# Patient Record
Sex: MALE | Race: WHITE | Employment: FULL TIME | URBAN - METROPOLITAN AREA
[De-identification: names, ages, dates, MRNs, and addresses within clinical notes are randomized per-mention and may not be internally consistent; named-entity substitution may affect disease eponyms.]

---

## 2017-04-06 ENCOUNTER — HOSPITAL ENCOUNTER (OUTPATIENT)
Dept: GENERAL RADIOLOGY | Age: 62
Discharge: HOME | End: 2017-04-06
Attending: ORTHOPAEDIC SURGERY
Payer: COMMERCIAL

## 2017-04-06 DIAGNOSIS — M16.0 OSTEOARTHRITIS OF BOTH HIPS: ICD-10-CM

## 2017-04-06 PROCEDURE — 77030003666 HC NDL SPINAL BD -A

## 2017-04-06 PROCEDURE — 74011000250 HC RX REV CODE- 250: Performed by: RADIOLOGY

## 2017-04-06 PROCEDURE — 20610 DRAIN/INJ JOINT/BURSA W/O US: CPT

## 2017-04-06 PROCEDURE — 74011250636 HC RX REV CODE- 250/636: Performed by: RADIOLOGY

## 2017-04-06 PROCEDURE — 74011636320 HC RX REV CODE- 636/320: Performed by: RADIOLOGY

## 2017-04-06 RX ORDER — TRIAMCINOLONE ACETONIDE 40 MG/ML
80 INJECTION, SUSPENSION INTRA-ARTICULAR; INTRAMUSCULAR
Status: COMPLETED | OUTPATIENT
Start: 2017-04-06 | End: 2017-04-06

## 2017-04-06 RX ORDER — LIDOCAINE HYDROCHLORIDE 10 MG/ML
20 INJECTION INFILTRATION; PERINEURAL
Status: COMPLETED | OUTPATIENT
Start: 2017-04-06 | End: 2017-04-06

## 2017-04-06 RX ORDER — BUPIVACAINE HYDROCHLORIDE 5 MG/ML
5 INJECTION, SOLUTION EPIDURAL; INTRACAUDAL
Status: DISPENSED | OUTPATIENT
Start: 2017-04-06 | End: 2017-04-06

## 2017-04-06 RX ADMIN — LIDOCAINE HYDROCHLORIDE 20 ML: 10 INJECTION, SOLUTION INFILTRATION; PERINEURAL at 12:10

## 2017-04-06 RX ADMIN — TRIAMCINOLONE ACETONIDE 40 MG: 40 INJECTION, SUSPENSION INTRA-ARTICULAR; INTRAMUSCULAR at 12:11

## 2017-04-06 RX ADMIN — IOHEXOL 20 ML: 180 INJECTION INTRAVENOUS at 12:10

## 2017-08-14 ENCOUNTER — OFFICE VISIT (OUTPATIENT)
Dept: CARDIOLOGY CLINIC | Age: 62
End: 2017-08-14

## 2017-08-14 VITALS
RESPIRATION RATE: 18 BRPM | DIASTOLIC BLOOD PRESSURE: 100 MMHG | SYSTOLIC BLOOD PRESSURE: 172 MMHG | HEART RATE: 67 BPM | WEIGHT: 191 LBS | OXYGEN SATURATION: 98 %

## 2017-08-14 DIAGNOSIS — R00.2 PALPITATIONS: Primary | ICD-10-CM

## 2017-08-14 DIAGNOSIS — R03.0 ELEVATED BP WITHOUT DIAGNOSIS OF HYPERTENSION: ICD-10-CM

## 2017-08-14 PROBLEM — I10 ESSENTIAL HYPERTENSION: Status: ACTIVE | Noted: 2017-08-14

## 2017-08-14 RX ORDER — CHOLECALCIFEROL (VITAMIN D3) 125 MCG
1 CAPSULE ORAL AS NEEDED
COMMUNITY
End: 2019-07-25

## 2017-08-14 RX ORDER — MULTIVIT WITH MINERALS/HERBS
1 TABLET ORAL DAILY
COMMUNITY

## 2017-08-14 NOTE — MR AVS SNAPSHOT
Visit Information Date & Time Provider Department Dept. Phone Encounter #  
 8/14/2017 11:00 AM Barry Frausto MD [facility] Cornell [phone] [id number] Your Appointments 8/30/2017  8:00 AM  
ECHO CARDIOGRAMS 2D with ECHO, BLANCA  
[facility] ([facility] SCHEDULING) [address] [facility] [place] [address] [facility] [address] [address] Upcoming Health Maintenance Date Due Hepatitis C Screening 1955 DTaP/Tdap/Td series (1 - Tdap) 9/16/1976 FOBT Q 1 YEAR AGE 50-75 9/16/2005 ZOSTER VACCINE AGE 60> 7/16/2015 INFLUENZA AGE 9 TO ADULT 8/1/2017 Allergies as of 8/14/2017  Review Complete On: 4/6/2017 By: RT Kaykay No Known Allergies Current Immunizations  Never Reviewed No immunizations on file. Not reviewed this visit You Were Diagnosed With   
  
 Codes Comments Essential hypertension    -  Primary ICD-10-CM: I10 
ICD-9-CM: 401.9 Vitals BP Pulse Resp Weight(growth percentile) SpO2 Smoking Status (!) 172/100 67 18 191 lb (86.6 kg) 98% Never Smoker Your Updated Medication List  
  
   
This list is accurate as of: 8/14/17 11:41 AM.  Always use your most recent med list.  
  
  
  
  
 ALEVE 220 mg Cap Generic drug:  naproxen sodium Take  by mouth.  
  
 b complex vitamins tablet Take 1 Tab by mouth daily. We Performed the Following AMB POC EKG ROUTINE W/ 12 LEADS, INTER & REP [51804 CPT(R)] Please provide this summary of care documentation to your next provider. Your primary care clinician is listed as Neha Remy. If you have any questions after today's visit, please call [phone].

## 2017-08-14 NOTE — PROGRESS NOTES
[address]  [address]  [place], [address] [facility]    Office [phone]  Fax [phone]  Cell [phone]        René Orellana is a 64 y.o. male referred for evaluation of palpitations. Consult requested by Viviana Snow MD.      Assessment  Encounter Diagnoses   Name Primary?  Palpitations Yes    Elevated BP without diagnosis of hypertension        Recommendations:    René Orellana has frequent spells of palpitations lasting 1-2 hours most suggestive of AF. He has no exertional sxs although did have one episode of palpitations associated with exercise. His exam and EKG are normal. BP is high today without antecedent HTN. Will evaluate further with 30 day monitor as well as ETT and echo. Favor routine labs- BMP, CBC, TSH    Phone follow up after reviewing tests    Subjective:  Mr. Pino Narayan has no previous cardiac hx. He stays active running with refereeing lacrosse, tennis, and volleyball. He reports that 7-8 months ago he first had palpitations and ever since has had intermittent random episodes that are increasing in frequency. Most recently, palpitations occur once every 7-8 days. Episodes typically last 1-2 hours but the most recent episode lasted 4 hours. He states when he is exercising while having episodes of palpitations he gets easily fatigued but episodes are typically not associated with exertion. He has a hx of unprovoked PE 15 years ago. He denies any gastrointestinal issues. He has not had any blood work recently. He states that he limits sweets and drinks a 6 pack of beer once a week. His sister had triple bypass surgery one year ago but states that she did not eat well. He denies any family history of A fib. He states he has been checking BPs at grocery store. He takes Aleve on a PRN basis. Patient denies any exertional chest pain, dyspnea, syncope, orthopnea, edema or paroxysmal nocturnal dyspnea.       Cardiac risk factors   HTN no  DM no  Smoking no  Family hx of CAD yes    Cardiac testing  No specialty comments available. Past Medical History:   Diagnosis Date    Pulmonary embolism (HCC)         Current Outpatient Prescriptions   Medication Sig Dispense Refill    naproxen sodium (ALEVE) 220 mg cap Take  by mouth.  b complex vitamins tablet Take 1 Tab by mouth daily. 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 Review of Systems  Constitutional: Negative for fever, chills, malaise/fatigue and diaphoresis. Respiratory: Negative for cough, hemoptysis, sputum production, shortness of breath and wheezing. Cardiovascular: Negative for chest pain, orthopnea, claudication, leg swelling and PND. Positive for palpitations. Gastrointestinal: Negative for heartburn, nausea, vomiting, blood in stool and melena. Genitourinary: Negative for dysuria and flank pain. Musculoskeletal: Negative for joint pain and back pain. Skin: Negative for rash. Neurological: Negative for focal weakness, seizures, loss of consciousness, weakness and headaches. Endo/Heme/Allergies: Does not bruise/bleed easily. Psychiatric/Behavioral: Negative for memory loss. The patient does not have insomnia. Physical Exam    Visit Vitals    BP (!) 172/100    Pulse 67    Resp 18    Wt 191 lb (86.6 kg)    SpO2 98%     Wt Readings from Last 3 Encounters:   08/14/17 191 lb (86.6 kg)      General - well developed well nourished  Neck - JVP normal, thyroid nl  Cardiac - normal S1, S2, no murmurs, rubs or gallops. No clicks  Vascular - carotids without bruits, radials, femorals and pedal pulses equal bilateral  Lungs - clear to auscultation bilaterals, no rales, wheezing or rhonchi  Abd - soft nontender, no HSM, no abd bruits  Extremities - no edema  Skin - no rash  Neuro - nonfocal  Psych - normal mood and affect      Cardiographics  EKG 8/14/17- SR, lateral T wave inversions, no previous EKG.      Written by Heather Spaulding, dictated by Solo Santos MD.  Solo Santos MD

## 2017-08-16 DIAGNOSIS — R00.2 PALPITATIONS: Primary | ICD-10-CM

## 2017-08-16 DIAGNOSIS — R03.0 ELEVATED BP WITHOUT DIAGNOSIS OF HYPERTENSION: ICD-10-CM

## 2017-08-20 PROBLEM — I10 ESSENTIAL HYPERTENSION: Status: RESOLVED | Noted: 2017-08-14 | Resolved: 2017-08-20

## 2017-08-29 ENCOUNTER — HOSPITAL ENCOUNTER (OUTPATIENT)
Dept: GENERAL RADIOLOGY | Age: 62
Discharge: HOME | End: 2017-08-29
Attending: ORTHOPAEDIC SURGERY
Payer: COMMERCIAL

## 2017-08-29 DIAGNOSIS — M16.0 ARTHRITIS OF BOTH HIPS: ICD-10-CM

## 2017-08-29 PROCEDURE — 20610 DRAIN/INJ JOINT/BURSA W/O US: CPT

## 2017-08-29 PROCEDURE — 74011250636 HC RX REV CODE- 250/636: Performed by: RADIOLOGY

## 2017-08-29 PROCEDURE — 74011000250 HC RX REV CODE- 250: Performed by: RADIOLOGY

## 2017-08-29 PROCEDURE — 74011636320 HC RX REV CODE- 636/320: Performed by: RADIOLOGY

## 2017-08-29 RX ORDER — LIDOCAINE HYDROCHLORIDE 10 MG/ML
10 INJECTION INFILTRATION; PERINEURAL
Status: COMPLETED | OUTPATIENT
Start: 2017-08-29 | End: 2017-08-29

## 2017-08-29 RX ORDER — BUPIVACAINE HYDROCHLORIDE 5 MG/ML
5 INJECTION, SOLUTION EPIDURAL; INTRACAUDAL
Status: COMPLETED | OUTPATIENT
Start: 2017-08-29 | End: 2017-08-29

## 2017-08-29 RX ORDER — TRIAMCINOLONE ACETONIDE 40 MG/ML
80 INJECTION, SUSPENSION INTRA-ARTICULAR; INTRAMUSCULAR
Status: COMPLETED | OUTPATIENT
Start: 2017-08-29 | End: 2017-08-29

## 2017-08-29 RX ADMIN — SODIUM BICARBONATE 3 ML: 0.2 INJECTION, SOLUTION INTRAVENOUS at 10:50

## 2017-08-29 RX ADMIN — BUPIVACAINE HYDROCHLORIDE 5 MG: 5 INJECTION, SOLUTION EPIDURAL; INTRACAUDAL at 10:50

## 2017-08-29 RX ADMIN — LIDOCAINE HYDROCHLORIDE 5 ML: 10 INJECTION, SOLUTION INFILTRATION; PERINEURAL at 10:50

## 2017-08-29 RX ADMIN — TRIAMCINOLONE ACETONIDE 40 MG: 40 INJECTION, SUSPENSION INTRA-ARTICULAR; INTRAMUSCULAR at 10:50

## 2017-08-29 RX ADMIN — IOHEXOL 10 ML: 300 INJECTION, SOLUTION INTRAVENOUS at 10:50

## 2017-08-30 ENCOUNTER — CLINICAL SUPPORT (OUTPATIENT)
Dept: CARDIOLOGY CLINIC | Age: 62
End: 2017-08-30

## 2017-08-30 DIAGNOSIS — R00.2 PALPITATIONS: Primary | ICD-10-CM

## 2017-08-30 DIAGNOSIS — R00.2 PALPITATIONS: ICD-10-CM

## 2017-08-30 DIAGNOSIS — R03.0 ELEVATED BP WITHOUT DIAGNOSIS OF HYPERTENSION: ICD-10-CM

## 2017-08-30 NOTE — PROGRESS NOTES
See scanned report. Dr. Corinthia Goodell ordered study and Dr. Corinthia Goodell read study. ID verified per protocol. Test and risks explained. Consent signed after all patient questions answered. BP elevated prior to test. Dr. Corinthia Goodell notified and test was continued. Patient leg discomfort and dyspnea during test. At 4 minutes in recovery, patient without symptoms or complaints voiced. Hypertensive BP response with exercise. Dr. Corinthia Goodell notified and interpreted stress test and echo which was done earlier. BP remained elevated, but to level when pateint arrived. Dr. Corinthia Goodell evaluated patient and discussed next plan of actions.

## 2017-09-07 DIAGNOSIS — R03.0 ELEVATED BP WITHOUT DIAGNOSIS OF HYPERTENSION: Primary | ICD-10-CM

## 2017-09-07 RX ORDER — LISINOPRIL 10 MG/1
10 TABLET ORAL DAILY
Qty: 30 TAB | Refills: 5 | Status: SHIPPED | OUTPATIENT
Start: 2017-09-07 | End: 2017-09-11 | Stop reason: SDUPTHER

## 2017-09-07 NOTE — TELEPHONE ENCOUNTER
Myrna Caro MD  [facility], L[facility]                     Can you send Rx for lisinopril 20/d for HTN? Change event recorder to 24 holter       Patient notified.

## 2017-09-11 ENCOUNTER — CLINICAL SUPPORT (OUTPATIENT)
Dept: CARDIOLOGY CLINIC | Age: 62
End: 2017-09-11

## 2017-09-11 DIAGNOSIS — R00.2 PALPITATIONS: Primary | ICD-10-CM

## 2017-09-11 DIAGNOSIS — R03.0 ELEVATED BP WITHOUT DIAGNOSIS OF HYPERTENSION: ICD-10-CM

## 2017-09-11 RX ORDER — LISINOPRIL 10 MG/1
10 TABLET ORAL DAILY
Qty: 30 TAB | Refills: 5 | Status: SHIPPED | OUTPATIENT
Start: 2017-09-11 | End: 2018-05-29 | Stop reason: SDUPTHER

## 2017-09-11 NOTE — PROGRESS NOTES
Applied holter monitor for pt per Dr August Mccray  Dx: palps. Pt has #[id number] & is due back on Tues 9/12/17.

## 2017-09-19 ENCOUNTER — TELEPHONE (OUTPATIENT)
Dept: CARDIOLOGY CLINIC | Age: 62
End: 2017-09-19

## 2017-09-19 NOTE — TELEPHONE ENCOUNTER
L/m for pt regarding overdue holter. He received it on Mon 9/11/17 & was due back on Tues 9/12/17. Pt needs to return as soon as possible in order to get results.

## 2017-09-22 ENCOUNTER — TELEPHONE (OUTPATIENT)
Dept: CARDIOLOGY CLINIC | Age: 62
End: 2017-09-22

## 2017-09-22 NOTE — TELEPHONE ENCOUNTER
Pt stated that he needs cardiac clearance from Dr Betina Johnston for hip surgery he will be having at the end of Oct.  Please call him regarding this for more information. Can be reached at his verified cell #. Pt returned his holter monitor today & stated it kept falling off. He thinks he may have worn it for 12 - 13 hrs. Will not be able to tell until we read monitor. Informed pt that it might need to be repeated.

## 2017-10-03 ENCOUNTER — TELEPHONE (OUTPATIENT)
Dept: CARDIOLOGY CLINIC | Age: 62
End: 2017-10-03

## 2017-10-03 PROBLEM — R00.2 PALPITATIONS: Status: RESOLVED | Noted: 2017-08-14 | Resolved: 2017-10-03

## 2017-10-03 PROBLEM — R03.0 ELEVATED BP WITHOUT DIAGNOSIS OF HYPERTENSION: Status: RESOLVED | Noted: 2017-08-14 | Resolved: 2017-10-03

## 2017-10-03 PROBLEM — I10 HTN (HYPERTENSION), BENIGN: Status: ACTIVE | Noted: 2017-10-03

## 2017-10-03 PROBLEM — I48.0 PAROXYSMAL ATRIAL FIBRILLATION (HCC): Status: ACTIVE | Noted: 2017-10-03

## 2017-10-03 RX ORDER — METOPROLOL SUCCINATE 50 MG/1
50 TABLET, EXTENDED RELEASE ORAL DAILY
Qty: 30 TAB | Refills: 3 | Status: SHIPPED | OUTPATIENT
Start: 2017-10-03 | End: 2018-05-29 | Stop reason: SDUPTHER

## 2017-10-03 NOTE — TELEPHONE ENCOUNTER
Cardiac testing  ETT 8/30/17 - 10:00. PMHR 169 (105%). Rest -104. Stress /120. No angina with exercise. Non-ischemic EKG with stress. Echo 8/30/17 - Ef 60-65%. No WMA. Mild LVH. Mild LAE. Small atrial septal aneurysm. Mild MR.  24 hour Holter monitor 9/11/17 - SR . Frequent PAC's. Non-sustained atrial tach vs. Afib with RVR. Significant HTN  - normal ETT  PAF  - mild LAE      Started on lisinopril 2 weeks ago  Will start Toprol XL 50 mg/d  Consider rhythm control Rx if frequent AF spells  [id number] score = 1 but favor long term anticoagulation nonetheless - defer until after hip surgery end of this month. Low cardiac risk for hip surgery.      Will see him 3-4 weeks post op

## 2017-10-05 ENCOUNTER — HOSPITAL ENCOUNTER (OUTPATIENT)
Dept: PREADMISSION TESTING | Age: 62
Discharge: HOME | End: 2017-10-05
Payer: COMMERCIAL

## 2017-10-05 VITALS
TEMPERATURE: 97.8 F | HEART RATE: 58 BPM | SYSTOLIC BLOOD PRESSURE: 183 MMHG | BODY MASS INDEX: 26.73 KG/M2 | DIASTOLIC BLOOD PRESSURE: 111 MMHG | WEIGHT: 180.5 LBS | HEIGHT: 69 IN

## 2017-10-05 LAB
ABO + RH BLD: NORMAL
ANION GAP SERPL CALC-SCNC: 7 MMOL/L (ref 5–15)
APPEARANCE UR: CLEAR
BACTERIA URNS QL MICRO: NEGATIVE /HPF
BILIRUB UR QL: NEGATIVE
BLOOD GROUP ANTIBODIES SERPL: NORMAL
BUN SERPL-MCNC: 22 MG/DL (ref 6–20)
BUN/CREAT SERPL: 18 (ref 12–20)
CALCIUM SERPL-MCNC: 9.2 MG/DL (ref 8.5–10.1)
CHLORIDE SERPL-SCNC: 106 MMOL/L (ref 97–108)
CO2 SERPL-SCNC: 28 MMOL/L (ref 21–32)
COLOR UR: ABNORMAL
CREAT SERPL-MCNC: 1.19 MG/DL (ref 0.7–1.3)
EPITH CASTS URNS QL MICRO: ABNORMAL /LPF
ERYTHROCYTE [DISTWIDTH] IN BLOOD BY AUTOMATED COUNT: 13 % (ref 11.5–14.5)
EST. AVERAGE GLUCOSE BLD GHB EST-MCNC: 117 MG/DL
GLUCOSE SERPL-MCNC: 109 MG/DL (ref 65–100)
GLUCOSE UR STRIP.AUTO-MCNC: NEGATIVE MG/DL
HBA1C MFR BLD: 5.7 % (ref 4.2–6.3)
HCT VFR BLD AUTO: 46.5 % (ref 36.6–50.3)
HGB BLD-MCNC: 15.6 G/DL (ref 12.1–17)
HGB UR QL STRIP: ABNORMAL
HYALINE CASTS URNS QL MICRO: ABNORMAL /LPF (ref 0–5)
INR PPP: 1 (ref 0.9–1.1)
KETONES UR QL STRIP.AUTO: NEGATIVE MG/DL
LEUKOCYTE ESTERASE UR QL STRIP.AUTO: NEGATIVE
MCH RBC QN AUTO: 31.8 PG (ref 26–34)
MCHC RBC AUTO-ENTMCNC: 33.5 G/DL (ref 30–36.5)
MCV RBC AUTO: 94.9 FL (ref 80–99)
NITRITE UR QL STRIP.AUTO: NEGATIVE
PH UR STRIP: 6 [PH] (ref 5–8)
PLATELET # BLD AUTO: 202 K/UL (ref 150–400)
POTASSIUM SERPL-SCNC: 4.1 MMOL/L (ref 3.5–5.1)
PROT UR STRIP-MCNC: NEGATIVE MG/DL
PROTHROMBIN TIME: 10.3 SEC (ref 9–11.1)
RBC # BLD AUTO: 4.9 M/UL (ref 4.1–5.7)
RBC #/AREA URNS HPF: ABNORMAL /HPF (ref 0–5)
SODIUM SERPL-SCNC: 141 MMOL/L (ref 136–145)
SP GR UR REFRACTOMETRY: 1.02 (ref 1–1.03)
SPECIMEN EXP DATE BLD: NORMAL
UA: UC IF INDICATED,UAUC: ABNORMAL
UROBILINOGEN UR QL STRIP.AUTO: 0.2 EU/DL (ref 0.2–1)
WBC # BLD AUTO: 12.6 K/UL (ref 4.1–11.1)
WBC URNS QL MICRO: ABNORMAL /HPF (ref 0–4)

## 2017-10-05 PROCEDURE — 85027 COMPLETE CBC AUTOMATED: CPT | Performed by: ORTHOPAEDIC SURGERY

## 2017-10-05 PROCEDURE — 86900 BLOOD TYPING SEROLOGIC ABO: CPT | Performed by: ORTHOPAEDIC SURGERY

## 2017-10-05 PROCEDURE — 36415 COLL VENOUS BLD VENIPUNCTURE: CPT | Performed by: ORTHOPAEDIC SURGERY

## 2017-10-05 PROCEDURE — 80048 BASIC METABOLIC PNL TOTAL CA: CPT | Performed by: ORTHOPAEDIC SURGERY

## 2017-10-05 PROCEDURE — 85610 PROTHROMBIN TIME: CPT | Performed by: ORTHOPAEDIC SURGERY

## 2017-10-05 PROCEDURE — 81001 URINALYSIS AUTO W/SCOPE: CPT | Performed by: ORTHOPAEDIC SURGERY

## 2017-10-05 PROCEDURE — 83036 HEMOGLOBIN GLYCOSYLATED A1C: CPT | Performed by: ORTHOPAEDIC SURGERY

## 2017-10-05 RX ORDER — DICLOFENAC SODIUM 75 MG/1
75 TABLET, DELAYED RELEASE ORAL 2 TIMES DAILY
COMMUNITY
End: 2017-10-10

## 2017-10-05 RX ORDER — TRAMADOL HYDROCHLORIDE 50 MG/1
50 TABLET ORAL
COMMUNITY
End: 2017-10-31

## 2017-10-05 RX ORDER — METHYLPREDNISOLONE 4 MG/1
4 TABLET ORAL
Status: ON HOLD | COMMUNITY
End: 2017-10-30

## 2017-10-05 NOTE — PERIOP NOTES
Preoperative instructions reviewed with patient. Patient given six packs of CHG wipes. Instructions(reviewed/to be reviewed in class) on use of CHG wipes. Patient given SSI infection FAQS sheet, as well as a  MRSA/MSSA treatment instruction sheet  With an explanation to patient that they will be notified if treatment instructions need to be initiated. Patient was given the opportunity to ask questions on the information provided.

## 2017-10-06 LAB
BACTERIA SPEC CULT: ABNORMAL
BACTERIA SPEC CULT: ABNORMAL
SERVICE CMNT-IMP: ABNORMAL

## 2017-10-09 NOTE — PERIOP NOTES
NASAL SWAB FROM 10/5/2017 RESULTED APPARENT STAPHYLOCOCUS AUREUS. CHART GIVEN TO DANIELITO SIGALA NP TO INITIATE TREATMENT.

## 2017-10-10 RX ORDER — MUPIROCIN 20 MG/G
OINTMENT TOPICAL 2 TIMES DAILY
Qty: 22 G | Refills: 0 | Status: SHIPPED | OUTPATIENT
Start: 2017-10-23 | End: 2017-10-31

## 2017-10-28 ENCOUNTER — ANESTHESIA EVENT (OUTPATIENT)
Dept: SURGERY | Age: 62
DRG: 470 | End: 2017-10-28
Payer: COMMERCIAL

## 2017-10-30 ENCOUNTER — APPOINTMENT (OUTPATIENT)
Dept: GENERAL RADIOLOGY | Age: 62
DRG: 470 | End: 2017-10-30
Attending: ORTHOPAEDIC SURGERY
Payer: COMMERCIAL

## 2017-10-30 ENCOUNTER — HOSPITAL ENCOUNTER (INPATIENT)
Age: 62
LOS: 1 days | Discharge: HOME HEALTH | DRG: 470 | End: 2017-10-31
Attending: ORTHOPAEDIC SURGERY | Admitting: ORTHOPAEDIC SURGERY
Payer: COMMERCIAL

## 2017-10-30 ENCOUNTER — ANESTHESIA (OUTPATIENT)
Dept: SURGERY | Age: 62
DRG: 470 | End: 2017-10-30
Payer: COMMERCIAL

## 2017-10-30 PROBLEM — M16.11 OSTEOARTHRITIS OF ONE HIP, RIGHT: Status: ACTIVE | Noted: 2017-10-30

## 2017-10-30 LAB
ABO + RH BLD: NORMAL
BLOOD GROUP ANTIBODIES SERPL: NORMAL
GLUCOSE BLD STRIP.AUTO-MCNC: 92 MG/DL (ref 65–100)
SERVICE CMNT-IMP: NORMAL
SPECIMEN EXP DATE BLD: NORMAL

## 2017-10-30 PROCEDURE — 82962 GLUCOSE BLOOD TEST: CPT

## 2017-10-30 PROCEDURE — 86900 BLOOD TYPING SEROLOGIC ABO: CPT | Performed by: ORTHOPAEDIC SURGERY

## 2017-10-30 PROCEDURE — 77030019908 HC STETH ESOPH SIMS -A: Performed by: ANESTHESIOLOGY

## 2017-10-30 PROCEDURE — 76000 FLUOROSCOPY <1 HR PHYS/QHP: CPT

## 2017-10-30 PROCEDURE — 77030020365 HC SOL INJ SOD CL 0.9% 50ML: Performed by: ORTHOPAEDIC SURGERY

## 2017-10-30 PROCEDURE — 73501 X-RAY EXAM HIP UNI 1 VIEW: CPT

## 2017-10-30 PROCEDURE — 77030033067 HC SUT PDO STRATFX SPIR J&J -B: Performed by: ORTHOPAEDIC SURGERY

## 2017-10-30 PROCEDURE — 74011250637 HC RX REV CODE- 250/637: Performed by: ORTHOPAEDIC SURGERY

## 2017-10-30 PROCEDURE — 74011000258 HC RX REV CODE- 258: Performed by: ORTHOPAEDIC SURGERY

## 2017-10-30 PROCEDURE — 77030006822 HC BLD SAW SAG BRSM -B: Performed by: ORTHOPAEDIC SURGERY

## 2017-10-30 PROCEDURE — 76210000017 HC OR PH I REC 1.5 TO 2 HR: Performed by: ORTHOPAEDIC SURGERY

## 2017-10-30 PROCEDURE — 77030008684 HC TU ET CUF COVD -B: Performed by: ANESTHESIOLOGY

## 2017-10-30 PROCEDURE — 77030018846 HC SOL IRR STRL H20 ICUM -A: Performed by: ORTHOPAEDIC SURGERY

## 2017-10-30 PROCEDURE — 74011000250 HC RX REV CODE- 250

## 2017-10-30 PROCEDURE — 74011250636 HC RX REV CODE- 250/636: Performed by: ANESTHESIOLOGY

## 2017-10-30 PROCEDURE — 36415 COLL VENOUS BLD VENIPUNCTURE: CPT | Performed by: ORTHOPAEDIC SURGERY

## 2017-10-30 PROCEDURE — 77030020788: Performed by: ORTHOPAEDIC SURGERY

## 2017-10-30 PROCEDURE — 0SR90JA REPLACEMENT OF RIGHT HIP JOINT WITH SYNTHETIC SUBSTITUTE, UNCEMENTED, OPEN APPROACH: ICD-10-PCS | Performed by: ORTHOPAEDIC SURGERY

## 2017-10-30 PROCEDURE — 76060000036 HC ANESTHESIA 2.5 TO 3 HR: Performed by: ORTHOPAEDIC SURGERY

## 2017-10-30 PROCEDURE — 97161 PT EVAL LOW COMPLEX 20 MIN: CPT

## 2017-10-30 PROCEDURE — 77030018836 HC SOL IRR NACL ICUM -A: Performed by: ORTHOPAEDIC SURGERY

## 2017-10-30 PROCEDURE — 77030011640 HC PAD GRND REM COVD -A: Performed by: ORTHOPAEDIC SURGERY

## 2017-10-30 PROCEDURE — 74011250636 HC RX REV CODE- 250/636: Performed by: ORTHOPAEDIC SURGERY

## 2017-10-30 PROCEDURE — 76010000172 HC OR TIME 2.5 TO 3 HR INTENSV-TIER 1: Performed by: ORTHOPAEDIC SURGERY

## 2017-10-30 PROCEDURE — C9290 INJ, BUPIVACAINE LIPOSOME: HCPCS | Performed by: ORTHOPAEDIC SURGERY

## 2017-10-30 PROCEDURE — 97116 GAIT TRAINING THERAPY: CPT

## 2017-10-30 PROCEDURE — 74011250637 HC RX REV CODE- 250/637

## 2017-10-30 PROCEDURE — C1713 ANCHOR/SCREW BN/BN,TIS/BN: HCPCS | Performed by: ORTHOPAEDIC SURGERY

## 2017-10-30 PROCEDURE — 77030013079 HC BLNKT BAIR HGGR 3M -A: Performed by: ANESTHESIOLOGY

## 2017-10-30 PROCEDURE — 77030020782 HC GWN BAIR PAWS FLX 3M -B

## 2017-10-30 PROCEDURE — 77030012935 HC DRSG AQUACEL BMS -B: Performed by: ORTHOPAEDIC SURGERY

## 2017-10-30 PROCEDURE — C1776 JOINT DEVICE (IMPLANTABLE): HCPCS | Performed by: ORTHOPAEDIC SURGERY

## 2017-10-30 PROCEDURE — 77030002933 HC SUT MCRYL J&J -A: Performed by: ORTHOPAEDIC SURGERY

## 2017-10-30 PROCEDURE — 74011250636 HC RX REV CODE- 250/636

## 2017-10-30 PROCEDURE — 77030034850: Performed by: ORTHOPAEDIC SURGERY

## 2017-10-30 PROCEDURE — 65270000029 HC RM PRIVATE

## 2017-10-30 PROCEDURE — 74011000250 HC RX REV CODE- 250: Performed by: ORTHOPAEDIC SURGERY

## 2017-10-30 DEVICE — CANCELLOUS BONE SCREW FLAT HEAD Ø 6,5 L45
Type: IMPLANTABLE DEVICE | Site: HIP | Status: FUNCTIONAL
Brand: MPACT ACETABULAR SYSTEM

## 2017-10-30 DEVICE — STEM FEM PRSS FT 1 HIP PRIMARY CEM UPLR/BPLR LNR POLYETH: Type: IMPLANTABLE DEVICE | Status: FUNCTIONAL

## 2017-10-30 DEVICE — SCR ACET CANC PINN 6.5X15MM SS --: Type: IMPLANTABLE DEVICE | Site: HIP | Status: FUNCTIONAL

## 2017-10-30 DEVICE — FLAT PE  HC LINER Ø 36 / G
Type: IMPLANTABLE DEVICE | Site: HIP | Status: FUNCTIONAL
Brand: MPACT ACETABULAR SYSTEM

## 2017-10-30 DEVICE — QUADRA H CEMENTLESS STEM LATERALIZED SIZE 4 SHORT NECK
Type: IMPLANTABLE DEVICE | Site: HIP | Status: FUNCTIONAL
Brand: QUADRA H FEMORAL STEMS

## 2017-10-30 DEVICE — ACETABULAR SHELL Ø60 TWO HOLES
Type: IMPLANTABLE DEVICE | Site: HIP | Status: FUNCTIONAL
Brand: MPACT ACETABULAR SYSTEM

## 2017-10-30 DEVICE — FEMORAL HEAD Ø 36 SIZE XL
Type: IMPLANTABLE DEVICE | Site: HIP | Status: FUNCTIONAL
Brand: MECTACER BIOLOX DELTA FEMORAL BALL HEAD

## 2017-10-30 DEVICE — MPACT SHELL SCREW PLUG
Type: IMPLANTABLE DEVICE | Site: HIP | Status: FUNCTIONAL
Brand: MPACT ACETABULAR SYSTEM

## 2017-10-30 RX ORDER — HYDROMORPHONE HYDROCHLORIDE 1 MG/ML
INJECTION, SOLUTION INTRAMUSCULAR; INTRAVENOUS; SUBCUTANEOUS AS NEEDED
Status: DISCONTINUED | OUTPATIENT
Start: 2017-10-30 | End: 2017-10-30 | Stop reason: HOSPADM

## 2017-10-30 RX ORDER — ACETAMINOPHEN 500 MG
1000 TABLET ORAL ONCE
Status: COMPLETED | OUTPATIENT
Start: 2017-10-30 | End: 2017-10-30

## 2017-10-30 RX ORDER — SODIUM CHLORIDE 0.9 % (FLUSH) 0.9 %
5-10 SYRINGE (ML) INJECTION AS NEEDED
Status: DISCONTINUED | OUTPATIENT
Start: 2017-10-30 | End: 2017-10-30 | Stop reason: HOSPADM

## 2017-10-30 RX ORDER — TRAMADOL HYDROCHLORIDE 50 MG/1
50 TABLET ORAL
Status: DISCONTINUED | OUTPATIENT
Start: 2017-10-30 | End: 2017-10-31 | Stop reason: HOSPADM

## 2017-10-30 RX ORDER — SODIUM CHLORIDE 9 MG/ML
100 INJECTION, SOLUTION INTRAVENOUS CONTINUOUS
Status: DISCONTINUED | OUTPATIENT
Start: 2017-10-30 | End: 2017-10-30 | Stop reason: HOSPADM

## 2017-10-30 RX ORDER — DEXAMETHASONE SODIUM PHOSPHATE 4 MG/ML
INJECTION, SOLUTION INTRA-ARTICULAR; INTRALESIONAL; INTRAMUSCULAR; INTRAVENOUS; SOFT TISSUE AS NEEDED
Status: DISCONTINUED | OUTPATIENT
Start: 2017-10-30 | End: 2017-10-30 | Stop reason: HOSPADM

## 2017-10-30 RX ORDER — DEXTROSE, SODIUM CHLORIDE, SODIUM LACTATE, POTASSIUM CHLORIDE, AND CALCIUM CHLORIDE 5; .6; .31; .03; .02 G/100ML; G/100ML; G/100ML; G/100ML; G/100ML
25 INJECTION, SOLUTION INTRAVENOUS CONTINUOUS
Status: DISCONTINUED | OUTPATIENT
Start: 2017-10-30 | End: 2017-10-30 | Stop reason: HOSPADM

## 2017-10-30 RX ORDER — POLYETHYLENE GLYCOL 3350 17 G/17G
17 POWDER, FOR SOLUTION ORAL DAILY
Status: DISCONTINUED | OUTPATIENT
Start: 2017-10-31 | End: 2017-10-31 | Stop reason: HOSPADM

## 2017-10-30 RX ORDER — ONDANSETRON 2 MG/ML
4 INJECTION INTRAMUSCULAR; INTRAVENOUS AS NEEDED
Status: DISCONTINUED | OUTPATIENT
Start: 2017-10-30 | End: 2017-10-30 | Stop reason: HOSPADM

## 2017-10-30 RX ORDER — ASPIRIN 325 MG
325 TABLET, DELAYED RELEASE (ENTERIC COATED) ORAL 2 TIMES DAILY
Status: DISCONTINUED | OUTPATIENT
Start: 2017-10-30 | End: 2017-10-31 | Stop reason: HOSPADM

## 2017-10-30 RX ORDER — SODIUM CHLORIDE, SODIUM LACTATE, POTASSIUM CHLORIDE, CALCIUM CHLORIDE 600; 310; 30; 20 MG/100ML; MG/100ML; MG/100ML; MG/100ML
25 INJECTION, SOLUTION INTRAVENOUS CONTINUOUS
Status: DISCONTINUED | OUTPATIENT
Start: 2017-10-30 | End: 2017-10-30 | Stop reason: HOSPADM

## 2017-10-30 RX ORDER — LIDOCAINE HYDROCHLORIDE 20 MG/ML
INJECTION, SOLUTION EPIDURAL; INFILTRATION; INTRACAUDAL; PERINEURAL AS NEEDED
Status: DISCONTINUED | OUTPATIENT
Start: 2017-10-30 | End: 2017-10-30 | Stop reason: HOSPADM

## 2017-10-30 RX ORDER — CEFAZOLIN SODIUM IN 0.9 % NACL 2 G/50 ML
2 INTRAVENOUS SOLUTION, PIGGYBACK (ML) INTRAVENOUS ONCE
Status: COMPLETED | OUTPATIENT
Start: 2017-10-30 | End: 2017-10-30

## 2017-10-30 RX ORDER — SODIUM CHLORIDE 0.9 % (FLUSH) 0.9 %
5-10 SYRINGE (ML) INJECTION AS NEEDED
Status: DISCONTINUED | OUTPATIENT
Start: 2017-10-30 | End: 2017-10-31 | Stop reason: HOSPADM

## 2017-10-30 RX ORDER — HYDROMORPHONE HYDROCHLORIDE 1 MG/ML
0.5 INJECTION, SOLUTION INTRAMUSCULAR; INTRAVENOUS; SUBCUTANEOUS
Status: ACTIVE | OUTPATIENT
Start: 2017-10-30 | End: 2017-10-31

## 2017-10-30 RX ORDER — MIDAZOLAM HYDROCHLORIDE 1 MG/ML
0.5 INJECTION, SOLUTION INTRAMUSCULAR; INTRAVENOUS
Status: COMPLETED | OUTPATIENT
Start: 2017-10-30 | End: 2017-10-30

## 2017-10-30 RX ORDER — LIDOCAINE HYDROCHLORIDE 10 MG/ML
0.1 INJECTION, SOLUTION EPIDURAL; INFILTRATION; INTRACAUDAL; PERINEURAL AS NEEDED
Status: DISCONTINUED | OUTPATIENT
Start: 2017-10-30 | End: 2017-10-30 | Stop reason: HOSPADM

## 2017-10-30 RX ORDER — MIDAZOLAM HYDROCHLORIDE 1 MG/ML
1 INJECTION, SOLUTION INTRAMUSCULAR; INTRAVENOUS AS NEEDED
Status: DISCONTINUED | OUTPATIENT
Start: 2017-10-30 | End: 2017-10-30 | Stop reason: HOSPADM

## 2017-10-30 RX ORDER — ROCURONIUM BROMIDE 10 MG/ML
INJECTION, SOLUTION INTRAVENOUS AS NEEDED
Status: DISCONTINUED | OUTPATIENT
Start: 2017-10-30 | End: 2017-10-30 | Stop reason: HOSPADM

## 2017-10-30 RX ORDER — PHENYLEPHRINE HCL IN 0.9% NACL 0.4MG/10ML
SYRINGE (ML) INTRAVENOUS AS NEEDED
Status: DISCONTINUED | OUTPATIENT
Start: 2017-10-30 | End: 2017-10-30 | Stop reason: HOSPADM

## 2017-10-30 RX ORDER — METOPROLOL TARTRATE 5 MG/5ML
2 INJECTION INTRAVENOUS
Status: DISCONTINUED | OUTPATIENT
Start: 2017-10-30 | End: 2017-10-30 | Stop reason: HOSPADM

## 2017-10-30 RX ORDER — MORPHINE SULFATE 10 MG/ML
2 INJECTION, SOLUTION INTRAMUSCULAR; INTRAVENOUS
Status: DISCONTINUED | OUTPATIENT
Start: 2017-10-30 | End: 2017-10-30 | Stop reason: HOSPADM

## 2017-10-30 RX ORDER — DIPHENHYDRAMINE HYDROCHLORIDE 50 MG/ML
12.5 INJECTION, SOLUTION INTRAMUSCULAR; INTRAVENOUS AS NEEDED
Status: DISCONTINUED | OUTPATIENT
Start: 2017-10-30 | End: 2017-10-30 | Stop reason: HOSPADM

## 2017-10-30 RX ORDER — FENTANYL CITRATE 50 UG/ML
50 INJECTION, SOLUTION INTRAMUSCULAR; INTRAVENOUS AS NEEDED
Status: DISCONTINUED | OUTPATIENT
Start: 2017-10-30 | End: 2017-10-30 | Stop reason: HOSPADM

## 2017-10-30 RX ORDER — GLYCOPYRROLATE 0.2 MG/ML
INJECTION INTRAMUSCULAR; INTRAVENOUS AS NEEDED
Status: DISCONTINUED | OUTPATIENT
Start: 2017-10-30 | End: 2017-10-30 | Stop reason: HOSPADM

## 2017-10-30 RX ORDER — SODIUM CHLORIDE, SODIUM LACTATE, POTASSIUM CHLORIDE, CALCIUM CHLORIDE 600; 310; 30; 20 MG/100ML; MG/100ML; MG/100ML; MG/100ML
INJECTION, SOLUTION INTRAVENOUS
Status: DISCONTINUED | OUTPATIENT
Start: 2017-10-30 | End: 2017-10-30 | Stop reason: HOSPADM

## 2017-10-30 RX ORDER — NEOSTIGMINE METHYLSULFATE 1 MG/ML
INJECTION INTRAVENOUS AS NEEDED
Status: DISCONTINUED | OUTPATIENT
Start: 2017-10-30 | End: 2017-10-30 | Stop reason: HOSPADM

## 2017-10-30 RX ORDER — SODIUM CHLORIDE 0.9 % (FLUSH) 0.9 %
5-10 SYRINGE (ML) INJECTION EVERY 8 HOURS
Status: DISCONTINUED | OUTPATIENT
Start: 2017-10-30 | End: 2017-10-30 | Stop reason: HOSPADM

## 2017-10-30 RX ORDER — FACIAL-BODY WIPES
10 EACH TOPICAL DAILY PRN
Status: DISCONTINUED | OUTPATIENT
Start: 2017-11-01 | End: 2017-10-31 | Stop reason: HOSPADM

## 2017-10-30 RX ORDER — SODIUM CHLORIDE 0.9 % (FLUSH) 0.9 %
5-10 SYRINGE (ML) INJECTION EVERY 8 HOURS
Status: DISCONTINUED | OUTPATIENT
Start: 2017-10-31 | End: 2017-10-31 | Stop reason: HOSPADM

## 2017-10-30 RX ORDER — MIDAZOLAM HYDROCHLORIDE 1 MG/ML
INJECTION, SOLUTION INTRAMUSCULAR; INTRAVENOUS AS NEEDED
Status: DISCONTINUED | OUTPATIENT
Start: 2017-10-30 | End: 2017-10-30 | Stop reason: HOSPADM

## 2017-10-30 RX ORDER — SODIUM CHLORIDE 9 MG/ML
125 INJECTION, SOLUTION INTRAVENOUS CONTINUOUS
Status: DISCONTINUED | OUTPATIENT
Start: 2017-10-30 | End: 2017-10-31

## 2017-10-30 RX ORDER — SCOLOPAMINE TRANSDERMAL SYSTEM 1 MG/1
PATCH, EXTENDED RELEASE TRANSDERMAL AS NEEDED
Status: DISCONTINUED | OUTPATIENT
Start: 2017-10-30 | End: 2017-10-30 | Stop reason: HOSPADM

## 2017-10-30 RX ORDER — SODIUM CHLORIDE 9 MG/ML
INJECTION, SOLUTION INTRAVENOUS
Status: DISCONTINUED | OUTPATIENT
Start: 2017-10-30 | End: 2017-10-30 | Stop reason: HOSPADM

## 2017-10-30 RX ORDER — ONDANSETRON 2 MG/ML
4 INJECTION INTRAMUSCULAR; INTRAVENOUS
Status: ACTIVE | OUTPATIENT
Start: 2017-10-30 | End: 2017-10-31

## 2017-10-30 RX ORDER — PROPOFOL 10 MG/ML
INJECTION, EMULSION INTRAVENOUS AS NEEDED
Status: DISCONTINUED | OUTPATIENT
Start: 2017-10-30 | End: 2017-10-30 | Stop reason: HOSPADM

## 2017-10-30 RX ORDER — OXYCODONE HYDROCHLORIDE 5 MG/1
10 TABLET ORAL
Status: DISCONTINUED | OUTPATIENT
Start: 2017-10-30 | End: 2017-10-31 | Stop reason: HOSPADM

## 2017-10-30 RX ORDER — HYDROMORPHONE HYDROCHLORIDE 1 MG/ML
0.2 INJECTION, SOLUTION INTRAMUSCULAR; INTRAVENOUS; SUBCUTANEOUS
Status: DISCONTINUED | OUTPATIENT
Start: 2017-10-30 | End: 2017-10-30 | Stop reason: HOSPADM

## 2017-10-30 RX ORDER — ONDANSETRON 2 MG/ML
INJECTION INTRAMUSCULAR; INTRAVENOUS AS NEEDED
Status: DISCONTINUED | OUTPATIENT
Start: 2017-10-30 | End: 2017-10-30 | Stop reason: HOSPADM

## 2017-10-30 RX ORDER — SODIUM CHLORIDE 9 MG/ML
1000 INJECTION, SOLUTION INTRAVENOUS CONTINUOUS
Status: DISCONTINUED | OUTPATIENT
Start: 2017-10-30 | End: 2017-10-30 | Stop reason: HOSPADM

## 2017-10-30 RX ORDER — CELECOXIB 200 MG/1
200 CAPSULE ORAL ONCE
Status: COMPLETED | OUTPATIENT
Start: 2017-10-30 | End: 2017-10-30

## 2017-10-30 RX ORDER — AMOXICILLIN 250 MG
1 CAPSULE ORAL 2 TIMES DAILY
Status: DISCONTINUED | OUTPATIENT
Start: 2017-10-30 | End: 2017-10-31 | Stop reason: HOSPADM

## 2017-10-30 RX ORDER — CEFAZOLIN SODIUM IN 0.9 % NACL 2 G/50 ML
2 INTRAVENOUS SOLUTION, PIGGYBACK (ML) INTRAVENOUS EVERY 8 HOURS
Status: COMPLETED | OUTPATIENT
Start: 2017-10-30 | End: 2017-10-31

## 2017-10-30 RX ORDER — KETOROLAC TROMETHAMINE 30 MG/ML
30 INJECTION, SOLUTION INTRAMUSCULAR; INTRAVENOUS EVERY 6 HOURS
Status: COMPLETED | OUTPATIENT
Start: 2017-10-30 | End: 2017-10-31

## 2017-10-30 RX ORDER — METOPROLOL SUCCINATE 50 MG/1
50 TABLET, EXTENDED RELEASE ORAL
Status: DISCONTINUED | OUTPATIENT
Start: 2017-10-30 | End: 2017-10-31 | Stop reason: HOSPADM

## 2017-10-30 RX ORDER — MIDAZOLAM HYDROCHLORIDE 1 MG/ML
0.5 INJECTION, SOLUTION INTRAMUSCULAR; INTRAVENOUS
Status: DISCONTINUED | OUTPATIENT
Start: 2017-10-30 | End: 2017-10-30 | Stop reason: HOSPADM

## 2017-10-30 RX ORDER — FENTANYL CITRATE 50 UG/ML
25 INJECTION, SOLUTION INTRAMUSCULAR; INTRAVENOUS
Status: DISCONTINUED | OUTPATIENT
Start: 2017-10-30 | End: 2017-10-30 | Stop reason: HOSPADM

## 2017-10-30 RX ORDER — DEXAMETHASONE SODIUM PHOSPHATE 4 MG/ML
4 INJECTION, SOLUTION INTRA-ARTICULAR; INTRALESIONAL; INTRAMUSCULAR; INTRAVENOUS; SOFT TISSUE ONCE
Status: DISCONTINUED | OUTPATIENT
Start: 2017-10-30 | End: 2017-10-30 | Stop reason: HOSPADM

## 2017-10-30 RX ORDER — SODIUM CHLORIDE 9 MG/ML
25 INJECTION, SOLUTION INTRAVENOUS CONTINUOUS
Status: DISCONTINUED | OUTPATIENT
Start: 2017-10-30 | End: 2017-10-30 | Stop reason: HOSPADM

## 2017-10-30 RX ORDER — NALOXONE HYDROCHLORIDE 0.4 MG/ML
0.4 INJECTION, SOLUTION INTRAMUSCULAR; INTRAVENOUS; SUBCUTANEOUS AS NEEDED
Status: DISCONTINUED | OUTPATIENT
Start: 2017-10-30 | End: 2017-10-31 | Stop reason: HOSPADM

## 2017-10-30 RX ORDER — ACETAMINOPHEN 325 MG/1
650 TABLET ORAL
Status: DISCONTINUED | OUTPATIENT
Start: 2017-10-30 | End: 2017-10-31 | Stop reason: HOSPADM

## 2017-10-30 RX ORDER — FENTANYL CITRATE 50 UG/ML
INJECTION, SOLUTION INTRAMUSCULAR; INTRAVENOUS AS NEEDED
Status: DISCONTINUED | OUTPATIENT
Start: 2017-10-30 | End: 2017-10-30 | Stop reason: HOSPADM

## 2017-10-30 RX ORDER — FENTANYL CITRATE 50 UG/ML
25 INJECTION, SOLUTION INTRAMUSCULAR; INTRAVENOUS
Status: COMPLETED | OUTPATIENT
Start: 2017-10-30 | End: 2017-10-30

## 2017-10-30 RX ADMIN — SODIUM CHLORIDE, SODIUM LACTATE, POTASSIUM CHLORIDE, CALCIUM CHLORIDE: 600; 310; 30; 20 INJECTION, SOLUTION INTRAVENOUS at 12:00

## 2017-10-30 RX ADMIN — SCOLOPAMINE TRANSDERMAL SYSTEM 1 PATCH: 1 PATCH, EXTENDED RELEASE TRANSDERMAL at 10:58

## 2017-10-30 RX ADMIN — Medication 40 MCG: at 12:25

## 2017-10-30 RX ADMIN — MIDAZOLAM HYDROCHLORIDE 0.5 MG: 1 INJECTION, SOLUTION INTRAMUSCULAR; INTRAVENOUS at 14:10

## 2017-10-30 RX ADMIN — Medication 40 MCG: at 12:58

## 2017-10-30 RX ADMIN — Medication 40 MCG: at 11:23

## 2017-10-30 RX ADMIN — SODIUM CHLORIDE, SODIUM LACTATE, POTASSIUM CHLORIDE, AND CALCIUM CHLORIDE 25 ML/HR: 600; 310; 30; 20 INJECTION, SOLUTION INTRAVENOUS at 09:30

## 2017-10-30 RX ADMIN — CEFAZOLIN 2 G: 1 INJECTION, POWDER, FOR SOLUTION INTRAMUSCULAR; INTRAVENOUS; PARENTERAL at 11:07

## 2017-10-30 RX ADMIN — FENTANYL CITRATE 50 MCG: 50 INJECTION, SOLUTION INTRAMUSCULAR; INTRAVENOUS at 11:48

## 2017-10-30 RX ADMIN — SODIUM CHLORIDE: 9 INJECTION, SOLUTION INTRAVENOUS at 13:15

## 2017-10-30 RX ADMIN — FENTANYL CITRATE 25 MCG: 50 INJECTION, SOLUTION INTRAMUSCULAR; INTRAVENOUS at 14:10

## 2017-10-30 RX ADMIN — HYDROMORPHONE HYDROCHLORIDE 0.2 MG: 1 INJECTION, SOLUTION INTRAMUSCULAR; INTRAVENOUS; SUBCUTANEOUS at 14:45

## 2017-10-30 RX ADMIN — HYDROMORPHONE HYDROCHLORIDE 0.25 MG: 1 INJECTION, SOLUTION INTRAMUSCULAR; INTRAVENOUS; SUBCUTANEOUS at 13:15

## 2017-10-30 RX ADMIN — KETOROLAC TROMETHAMINE 30 MG: 30 INJECTION, SOLUTION INTRAMUSCULAR at 18:10

## 2017-10-30 RX ADMIN — ROCURONIUM BROMIDE 10 MG: 10 INJECTION, SOLUTION INTRAVENOUS at 11:36

## 2017-10-30 RX ADMIN — Medication 40 MCG: at 11:40

## 2017-10-30 RX ADMIN — ONDANSETRON 4 MG: 2 INJECTION INTRAMUSCULAR; INTRAVENOUS at 13:53

## 2017-10-30 RX ADMIN — SODIUM CHLORIDE, SODIUM LACTATE, POTASSIUM CHLORIDE, CALCIUM CHLORIDE: 600; 310; 30; 20 INJECTION, SOLUTION INTRAVENOUS at 10:56

## 2017-10-30 RX ADMIN — FENTANYL CITRATE 50 MCG: 50 INJECTION, SOLUTION INTRAMUSCULAR; INTRAVENOUS at 12:25

## 2017-10-30 RX ADMIN — GLYCOPYRROLATE 0.3 MG: 0.2 INJECTION INTRAMUSCULAR; INTRAVENOUS at 13:20

## 2017-10-30 RX ADMIN — MIDAZOLAM HYDROCHLORIDE 2 MG: 1 INJECTION, SOLUTION INTRAMUSCULAR; INTRAVENOUS at 10:58

## 2017-10-30 RX ADMIN — FENTANYL CITRATE 25 MCG: 50 INJECTION, SOLUTION INTRAMUSCULAR; INTRAVENOUS at 14:05

## 2017-10-30 RX ADMIN — Medication 40 MCG: at 12:55

## 2017-10-30 RX ADMIN — FENTANYL CITRATE 25 MCG: 50 INJECTION, SOLUTION INTRAMUSCULAR; INTRAVENOUS at 14:15

## 2017-10-30 RX ADMIN — FENTANYL CITRATE 25 MCG: 50 INJECTION, SOLUTION INTRAMUSCULAR; INTRAVENOUS at 13:35

## 2017-10-30 RX ADMIN — ONDANSETRON 4 MG: 2 INJECTION INTRAMUSCULAR; INTRAVENOUS at 14:15

## 2017-10-30 RX ADMIN — MIDAZOLAM HYDROCHLORIDE 0.5 MG: 1 INJECTION, SOLUTION INTRAMUSCULAR; INTRAVENOUS at 13:50

## 2017-10-30 RX ADMIN — ACETAMINOPHEN 1000 MG: 500 TABLET, FILM COATED ORAL at 09:27

## 2017-10-30 RX ADMIN — FENTANYL CITRATE 25 MCG: 50 INJECTION, SOLUTION INTRAMUSCULAR; INTRAVENOUS at 13:30

## 2017-10-30 RX ADMIN — CEFAZOLIN 2 G: 1 INJECTION, POWDER, FOR SOLUTION INTRAMUSCULAR; INTRAVENOUS; PARENTERAL at 18:10

## 2017-10-30 RX ADMIN — Medication 40 MCG: at 11:09

## 2017-10-30 RX ADMIN — ONDANSETRON 4 MG: 2 INJECTION INTRAMUSCULAR; INTRAVENOUS at 11:19

## 2017-10-30 RX ADMIN — FENTANYL CITRATE 25 MCG: 50 INJECTION, SOLUTION INTRAMUSCULAR; INTRAVENOUS at 14:00

## 2017-10-30 RX ADMIN — DEXAMETHASONE SODIUM PHOSPHATE 8 MG: 4 INJECTION, SOLUTION INTRA-ARTICULAR; INTRALESIONAL; INTRAMUSCULAR; INTRAVENOUS; SOFT TISSUE at 11:19

## 2017-10-30 RX ADMIN — MIDAZOLAM HYDROCHLORIDE 0.5 MG: 1 INJECTION, SOLUTION INTRAMUSCULAR; INTRAVENOUS at 14:00

## 2017-10-30 RX ADMIN — CELECOXIB 200 MG: 200 CAPSULE ORAL at 09:28

## 2017-10-30 RX ADMIN — DOCUSATE SODIUM AND SENNOSIDES 1 TABLET: 8.6; 5 TABLET, FILM COATED ORAL at 18:10

## 2017-10-30 RX ADMIN — SODIUM CHLORIDE 125 ML/HR: 900 INJECTION, SOLUTION INTRAVENOUS at 14:30

## 2017-10-30 RX ADMIN — HYDROMORPHONE HYDROCHLORIDE 0.2 MG: 1 INJECTION, SOLUTION INTRAMUSCULAR; INTRAVENOUS; SUBCUTANEOUS at 14:00

## 2017-10-30 RX ADMIN — HYDROMORPHONE HYDROCHLORIDE 0.5 MG: 1 INJECTION, SOLUTION INTRAMUSCULAR; INTRAVENOUS; SUBCUTANEOUS at 13:38

## 2017-10-30 RX ADMIN — NEOSTIGMINE METHYLSULFATE 3 MG: 1 INJECTION INTRAVENOUS at 13:20

## 2017-10-30 RX ADMIN — FENTANYL CITRATE 50 MCG: 50 INJECTION, SOLUTION INTRAMUSCULAR; INTRAVENOUS at 11:54

## 2017-10-30 RX ADMIN — MIDAZOLAM HYDROCHLORIDE 0.5 MG: 1 INJECTION, SOLUTION INTRAMUSCULAR; INTRAVENOUS at 13:45

## 2017-10-30 RX ADMIN — FENTANYL CITRATE 25 MCG: 50 INJECTION, SOLUTION INTRAMUSCULAR; INTRAVENOUS at 11:03

## 2017-10-30 RX ADMIN — HYDROMORPHONE HYDROCHLORIDE 0.2 MG: 1 INJECTION, SOLUTION INTRAMUSCULAR; INTRAVENOUS; SUBCUTANEOUS at 14:30

## 2017-10-30 RX ADMIN — HYDROMORPHONE HYDROCHLORIDE 0.2 MG: 1 INJECTION, SOLUTION INTRAMUSCULAR; INTRAVENOUS; SUBCUTANEOUS at 15:00

## 2017-10-30 RX ADMIN — MIDAZOLAM HYDROCHLORIDE 0.5 MG: 1 INJECTION, SOLUTION INTRAMUSCULAR; INTRAVENOUS at 14:05

## 2017-10-30 RX ADMIN — MIDAZOLAM HYDROCHLORIDE 0.5 MG: 1 INJECTION, SOLUTION INTRAMUSCULAR; INTRAVENOUS at 14:15

## 2017-10-30 RX ADMIN — FENTANYL CITRATE 25 MCG: 50 INJECTION, SOLUTION INTRAMUSCULAR; INTRAVENOUS at 11:04

## 2017-10-30 RX ADMIN — Medication 40 MCG: at 12:22

## 2017-10-30 RX ADMIN — Medication 40 MCG: at 12:52

## 2017-10-30 RX ADMIN — HYDROMORPHONE HYDROCHLORIDE 0.2 MG: 1 INJECTION, SOLUTION INTRAMUSCULAR; INTRAVENOUS; SUBCUTANEOUS at 14:15

## 2017-10-30 RX ADMIN — Medication 40 MCG: at 13:00

## 2017-10-30 RX ADMIN — ROCURONIUM BROMIDE 40 MG: 10 INJECTION, SOLUTION INTRAVENOUS at 11:04

## 2017-10-30 RX ADMIN — ASPIRIN 325 MG: 325 TABLET, DELAYED RELEASE ORAL at 18:10

## 2017-10-30 RX ADMIN — PROPOFOL 150 MG: 10 INJECTION, EMULSION INTRAVENOUS at 11:04

## 2017-10-30 RX ADMIN — LIDOCAINE HYDROCHLORIDE 80 MG: 20 INJECTION, SOLUTION EPIDURAL; INFILTRATION; INTRACAUDAL; PERINEURAL at 11:04

## 2017-10-30 RX ADMIN — HYDROMORPHONE HYDROCHLORIDE 0.25 MG: 1 INJECTION, SOLUTION INTRAMUSCULAR; INTRAVENOUS; SUBCUTANEOUS at 13:31

## 2017-10-30 NOTE — PROGRESS NOTES
TRANSFER - IN REPORT:    Verbal report received from Cierra(name) on Max Oneal  being received from [facility]) for routine post - op      Report consisted of patients Situation, Background, Assessment and   Recommendations(SBAR). Information from the following report(s) SBAR and Kardex was reviewed with the receiving nurse. Opportunity for questions and clarification was provided. Assessment completed upon patients arrival to unit and care assumed.

## 2017-10-30 NOTE — PROGRESS NOTES
Problem: Mobility Impaired (Adult and Pediatric)  Goal: *Acute Goals and Plan of Care (Insert Text)  Physical Therapy Goals  Initiated 10/30/2017    1. Patient will move from supine to sit and sit to supine , scoot up and down and roll side to side in bed with independence within 4 days. 2. Patient will perform sit to stand with modified independence within 4 days. 3. Patient will ambulate with modified independence for 200 feet with the least restrictive device within 4 days. 4. Patient will ascend/descend 3 stairs with 2 handrail(s) with modified independence within 4 days. 5. Patient will perform hip home exercise program per protocol with modified independence within 4 days. physical Therapy EVALUATION  Patient: Arjun Mccabe (5 y.o. male)  Date: 10/30/2017  Primary Diagnosis: OA RIGHT HIP  Osteoarthritis of one hip, right  Procedure(s) (LRB):  RIGHT TOTAL HIP ARTHROPLASTY WITH  ANTERIOR APPROACH (Right) Day of Surgery   Precautions:        ASSESSMENT :  Based on the objective data described below, the patient presents with  decreased independence with mobility compared to baseline level prior to admission due to decreased strength and ROM. Patient cleared by nursing for mobility and agreeable to participate in POD #0 mobility. Patient vital signs within normal limits. Patient able to perform bed mobility and achieve EOB sitting with supervision. Patient performed sit<>stand tx with CGA and demonstrates even WB bilaterally and good immediate standing balance. Patient able to ambulate with CGA and RW x 20 feet. Patient returned to sitting and supine position in bed. Patient reportedly attended pre operative joint replacement education class and has binder present. Physical therapist reviewed bed exercises and acute care expectations with patient. Patient able to correctly perform ankle pumps, heel slides, and quad sets and has no additional questions. Patient has crutches at home.  Will continue to follow to progress towards acute care goals. Recommend HHPT at d/c to continue to optimize independence with mobility. Patient will benefit from skilled intervention to address the above impairments. Patients rehabilitation potential is considered to be Good  Factors which may influence rehabilitation potential include:   []         None noted  []         Mental ability/status  []         Medical condition  []         Home/family situation and support systems  [x]         Safety awareness  []         Pain tolerance/management  []         Other:      PLAN :  Recommendations and Planned Interventions:  []           Bed Mobility Training             []    Neuromuscular Re-Education  []           Transfer Training                   []    Orthotic/Prosthetic Training  []           Gait Training                         []    Modalities  []           Therapeutic Exercises           []    Edema Management/Control  []           Therapeutic Activities            []    Patient and Family Training/Education  []           Other (comment):    Frequency/Duration: Patient will be followed by physical therapy  twice daily to address goals. Discharge Recommendations: Home Health  Further Equipment Recommendations for Discharge: crutches     SUBJECTIVE:   Patient stated I want this catheter out.     OBJECTIVE DATA SUMMARY:   HISTORY:    Past Medical History:   Diagnosis Date    Arrhythmia     AFIB    Chronic pain     RIGHT HIP    Hypertension     Nausea & vomiting     Pulmonary embolism (HCC)      Past Surgical History:   Procedure Laterality Date    HX HEENT      SEPTOPLASTY    HX TONSILLECTOMY       Prior Level of Function/Home Situation: mod I  Personal factors and/or comorbidities impacting plan of care:     Home Situation  Home Environment: Private residence  # Steps to Enter: 2  One/Two Story Residence: Two story  Living Alone: No  Support Systems: Family member(s)  Patient Expects to be Discharged to[de-identified] Private residence  Current DME Used/Available at Home: Cane, straight    EXAMINATION/PRESENTATION/DECISION MAKING:   Critical Behavior:              Hearing: Auditory  Auditory Impairment: None    Range Of Motion:  AROM: Within functional limits                       Strength:    Strength: Within functional limits                    Tone & Sensation:   Tone: Normal              Sensation: Intact               Coordination:  Coordination: Within functional limits  Vision:      Functional Mobility:  Bed Mobility:     Supine to Sit: Supervision  Sit to Supine: Supervision  Scooting: Supervision  Transfers:  Sit to Stand: Contact guard assistance  Stand to Sit: Contact guard assistance  Stand Pivot Transfers: Contact guard assistance                    Balance:   Sitting: Intact  Standing: Intact; With support  Ambulation/Gait Training:  Distance (ft): 20 Feet (ft)  Assistive Device: Gait belt;Walker, rolling  Ambulation - Level of Assistance: Contact guard assistance     Gait Description (WDL): Exceptions to WDL     Right Side Weight Bearing: As tolerated     Base of Support: Narrowed     Speed/Aysha: Accelerated  Step Length: Left shortened;Right shortened             Functional Measure:  Tinetti test:    Sitting Balance: 1  Arises: 1  Attempts to Rise: 2  Immediate Standing Balance: 1  Standing Balance: 1  Nudged: 2  Eyes Closed: 1  Turn 360 Degrees - Continuous/Discontinuous: 1  Turn 360 Degrees - Steady/Unsteady: 1  Sitting Down: 2  Balance Score: 13  Indication of Gait: 1  R Step Length/Height: 1  L Step Length/Height: 1  R Foot Clearance: 1  L Foot Clearance: 1  Step Symmetry: 1  Step Continuity: 1  Path: 1  Trunk: 0  Walking Time: 1  Gait Score: 9  Total Score: 22       Tinetti Test and G-code impairment scale:  Percentage of Impairment CH    0%   CI    1-19% CJ    20-39% CK    40-59% CL    60-79% CM    80-99% CN     100%   Tinetti  Score 0-28 28 23-27 17-22 12-16 6-11 1-5 0       Tinetti Tool Score Risk of Falls  <19 = High Fall Risk  19-24 = Moderate Fall Risk  25-28 = Low Fall Risk  Tinetti ME. Performance-Oriented Assessment of Mobility Problems in Elderly Patients. [facility] 66; [id number]. (Scoring Description: PT Bulletin Feb. 10, 1993)    Older adults: Brenna Bessie et al, 2009; n = 1000 [facility] elderly evaluated with ABC, ERROL, ADL, and IADL)  · Mean ERROL score for males aged 69-68 years = 26.21(3.40)  · Mean ERROL score for females age 69-68 years = 25.16(4.30)  · Mean ERROL score for males over 80 years = 23.29(6.02)  · Mean ERROL score for females over 80 years = 17.20(8.32)         G codes: In compliance with CMSs Claims Based Outcome Reporting, the following G-code set was chosen for this patient based on their primary functional limitation being treated: The outcome measure chosen to determine the severity of the functional limitation was the Tinetti with a score of 22/28 which was correlated with the impairment scale.     ? Mobility - Walking and Moving Around:     - CURRENT STATUS: CJ - 20%-39% impaired, limited or restricted    - GOAL STATUS: CI - 1%-19% impaired, limited or restricted    - D/C STATUS:  ---------------To be determined---------------      Physical Therapy Evaluation Charge Determination   History Examination Presentation Decision-Making   HIGH Complexity :3+ comorbidities / personal factors will impact the outcome/ POC  MEDIUM Complexity : 3 Standardized tests and measures addressing body structure, function, activity limitation and / or participation in recreation  LOW Complexity : Stable, uncomplicated  MEDIUM Complexity : FOTO score of 26-74      Based on the above components, the patient evaluation is determined to be of the following complexity level: LOW     Pain:  Pain Scale 1: Numeric (0 - 10)  Pain Intensity 1: 0  Pain Location 1: Hip  Pain Orientation 1: Right  Pain Description 1: Aching  Pain Intervention(s) 1: Emotional support;Position  Activity Tolerance:   No apparent distress  Please refer to the flowsheet for vital signs taken during this treatment. After treatment:   []         Patient left in no apparent distress sitting up in chair  [x]         Patient left in no apparent distress in bed  [x]         Call bell left within reach  [x]         Nursing notified  [x]         Caregiver present  []         Bed alarm activated    COMMUNICATION/EDUCATION:   The patients plan of care was discussed with: Registered Nurse. [x]         Fall prevention education was provided and the patient/caregiver indicated understanding. [x]         Patient/family have participated as able in goal setting and plan of care. [x]         Patient/family agree to work toward stated goals and plan of care. []         Patient understands intent and goals of therapy, but is neutral about his/her participation. []         Patient is unable to participate in goal setting and plan of care.     Thank you for this referral.  Ivania Spence, PT , DPT   Time Calculation: 19 mins

## 2017-10-30 NOTE — ANESTHESIA PREPROCEDURE EVALUATION
Anesthetic History   No history of anesthetic complications            Review of Systems / Medical History  Patient summary reviewed, nursing notes reviewed and pertinent labs reviewed    Pulmonary  Within defined limits                 Neuro/Psych   Within defined limits           Cardiovascular  Within defined limits  Hypertension        Dysrhythmias : atrial fibrillation           GI/Hepatic/Renal  Within defined limits              Endo/Other  Within defined limits           Other Findings              Physical Exam    Airway  Mallampati: II  TM Distance: > 6 cm  Neck ROM: normal range of motion   Mouth opening: Normal     Cardiovascular  Regular rate and rhythm,  S1 and S2 normal,  no murmur, click, rub, or gallop             Dental  No notable dental hx       Pulmonary  Breath sounds clear to auscultation               Abdominal  GI exam deferred       Other Findings            Anesthetic Plan    ASA: 2  Anesthesia type: general          Induction: Intravenous  Anesthetic plan and risks discussed with: Patient

## 2017-10-30 NOTE — OP NOTES
OPERATIVE REPORT  RIGHT TOTAL HIP REPLACEMENT (ANTERIOR APPROACH)    NAME: Tato Blandon  MRN: [id number]  :  1955  AGE: 58 y.o. DATE OF SURGERY:  10/30/2017    PREOPERATIVE DIAGNOSIS: Severe degenerative joint disease, right hip. POSTOPERATIVE DIAGNOSIS: Severe degenerative joint disease, right hip. PROCEDURES PERFORMED: Right total hip replacement - Anterior approach    SURGEON: Charles Vital MD.    ASSISTANT:Breanna Campbell    ANESTHESIA: General    ESTIMATED BLOOD LOSS: 125 mL. DRAINS: None. COMPLICATIONS: None. SPECIMENS REMOVED: None. PRE-OP ANTIBIOTIC: Ancef 2 gram    IMPLANT:   Implant Name Type Inv. Item Serial No.  Lot No. LRB No. Used Action   LINER ACET FLAT HI X SZ G 36MM -- MPACT - SNA  LINER ACET FLAT HI X SZ G 36MM -- MPACT NA [employer] [place] [id number] Right 1 Implanted   SHELL ACET CUP 2H 60MM -- MPACT TWO HOLE - SNA  SHELL ACET CUP 2H 60MM -- MPACT TWO HOLE NA [employer] [place] [id number] Right 1 Implanted   SCR ACET CANC PINN 6.5X15MM SS --  - SNA  SCR ACET CANC PINN 6.5X15MM SS --  NA [facility] [employer] [id number] Right 1 Implanted   PLUG ACET SHELL -- MPACT - SNA  PLUG ACET SHELL -- MPACT NA [employer] [place] [id number] Right 1 Implanted   SCR BNE CANC FLAT HD 6.5X45MM -- MPACT - SNA  SCR BNE CANC FLAT HD 6.5X45MM -- MPACT NA [employer] [place] [id number] Right 1 Implanted   STEM FEM CMTL LAT SZ 4 -- SHRT NECK QUADRA H - SNA  STEM FEM CMTL LAT SZ 4 -- SHRT NECK QUADRA H NA [employer] [place] [id number] Right 1 Implanted   HEAD FEM XLG 36MM CER -- BIOLOX DELTA - SNA   HEAD FEM XLG 36MM CER -- BIOLOX DELTA NA [employer] [place] [id number] Right 1 Implanted       INDICATIONS: 58 yrs male  with severe DJD of the right hip. The patient's right hip has been progressive in terms of symptoms. The patient now has severe activity limitation. The patient has continued with conservative management without adequate relief or improvement of functional limitations. We discussed options and he wished to proceed with right total hip replacement. The patient has continued with conservative management without adequate relief or improvement of functional limitations. DESCRIPTION OF PROCEDURE: Anesthetic was initiated. Preoperative dose of IV  Ancef was given. Cordon catheter was placed. The right side was  confirmed as the operative side, prepped and draped in the usual sterile fashion. Skin was covered with Ioban occlusive dressing. Direct anterior exposure was made to the patient's hip through the sartorius tensor interval. Anterior hip vasculature was cauterized. Retractors were taken out to observe for bleeding and there was none. The capsule was identified, opened and T'd distally. The femoral neck was osteotomized. Femoral head was removed from the acetabulum, which was exposed and soft tissues were removed. The acetabulum was progressively  reamed to 58 and a 60 trial shell was impacted with good press-fit. This was removed and a 60 Medacta shell was impacted in the acetabulum in 40 degrees of abduction in an anatomic-type anteversion. Bone spurs were removed and 6.5 screws x2 were placed. The polyethylene liner was placed. Femur was positioned and elevated from the wound. The medullary canal was entered. Flexible reamers were utilized as the patient had a narrowed femoral canal, broached to a size 4. Calcar planed and then trialed. A 8 hip ball was the most appropriate for leg length and tension with a standard offset stem. The hip was dislocated. The anterior greater trochanter was trimmed down to enhance flexion, rotation and stability. The trial was removed and the real stem was impacted. The real hip ball was placed. The hip was reduced. After copious irrigation, the capsule was closed with #2 Vicryl sutures. I irrigated the skin, subcutaneous and deep wound. I closed the fascia of the tensor fascia jeaneth with #2 Vicryl sutures. Skin and subcutaneous were irrigated. Soft tissues were infiltrated with local anesthetic. Skin and subcutaneous were closed in a standard fashion. Sterile dressing was applied. There were no complications. No specimen was sent. The procedure was a RIGHT TOTAL HIP REPLACEMENT using a Medacta total hip construct. Justin Palma was of vital assistance throughout the duration of the procedure. The patient was transferred to the recovery room in stable condition.

## 2017-10-30 NOTE — IP AVS SNAPSHOT
[facility] 
[phone] Patient: Tato Blandon MRN: [id number]:4/74/[id number] About your hospitalization You were admitted on:  October 30, 2017 You last received care in the:  [address] [place] [address] You were discharged on:  October 31, 2017 Why you were hospitalized Your primary diagnosis was:  Not on File Your diagnoses also included:  Osteoarthritis Of One Hip, Right Things You Need To Do (next 8 weeks) Follow up with Gunner Davila MD  
  
Phone:  [phone] Where:  Kyung Duarte [address] [place] [id number] Follow up with AT [address] [place] [address] Referred for home health services. Service to begin the day after discharge. Please call the agency if no contact by 12 noon the day after discharge. Phone:  [phone] Where:  [address] [phone] Discharge Orders None A check bri indicates which time of day the medication should be taken. My Medications STOP taking these medications   
 mupirocin 2 % ointment Commonly known as:  BACTROBAN  
   
  
 traMADol 50 mg tablet Commonly known as:  ULTRAM  
   
  
  
TAKE these medications as instructed Instructions Each Dose to Equal  
 Morning Noon Evening Bedtime ALEVE 220 mg Cap Generic drug:  naproxen sodium Your last dose was: Your next dose is: Take  by mouth. AMLODIPINE BESYLATE (BULK) Your last dose was: Your next dose is:    
   
   
 5 mg by Does Not Apply route daily. 5 mg  
    
   
   
   
  
 aspirin delayed-release 325 mg tablet Your last dose was: Your next dose is: Take 1 Tab by mouth two (2) times a day. 325 mg  
    
   
   
   
  
 b complex vitamins tablet Your last dose was: Your next dose is: Take 1 Tab by mouth daily. 1 Tab lisinopril 10 mg tablet Commonly known as:  Radha Clifton Your last dose was: Your next dose is: Take 1 Tab by mouth daily. 10 mg  
    
   
   
   
  
 metoprolol succinate 50 mg XL tablet Commonly known as:  TOPROL-XL Your last dose was: Your next dose is: Take 1 Tab by mouth daily. 50 mg  
    
   
   
   
  
 oxyCODONE IR 5 mg immediate release tablet Commonly known as:  Jorge Mesa Your last dose was: Your next dose is: Take 1 Tab by mouth every three (3) hours as needed. Max Daily Amount: 40 mg.  
 5 mg Where to Get Your Medications These medications were sent to [facility] - [address], [address]  [address], [facility] Phone:  [phone]  
  aspirin delayed-release 325 mg tablet Information on where to get these meds will be given to you by the nurse or doctor. ! Ask your nurse or doctor about these medications  
  oxyCODONE IR 5 mg immediate release tablet Discharge Instructions After Hospital Care Plan:  Discharge Instructions Anterior Approach Hip Replacement-Dr. Malathi Dobson Patient [facility] Date of procedure:10/30/2017 Procedure:Procedure(s): RIGHT TOTAL HIP ARTHROPLASTY WITH  ANTERIOR APPROACH Surgeon:Surgeon(s) and Role: Reji Hinds MD - Primary PCP: Hardeep Quintero MD 
Date of discharge: No discharge date for patient encounter. Follow up appointments ? Follow up with Dr. Malathi Dobson in 3 weeks. Call [phone] to make an appointment. ? If home health has been arranged for you the agency will contact you to arrange dates/times for visits. Please call them if you do not hear from them within 24 hours after you are discharged When to call your Orthopaedic Surgeon: Call [phone].  If you need to reach us after 5pm or on a weekend; call [phone] and the on call physician will be contacted ? Increased hip pain, unrelieved pain or if you have difficulty or are unable to walk ? Signs of infection-if your incision is red; continues to have drainage; drainage has a foul odor or if you have a persistent fever over 101 degrees ? Signs of a blood clot in your leg-calf pain, tenderness, redness, swelling of lower leg When to call your Primary Care Physician: 
? Concerns about medical conditions such as diabetes, high blood pressure, asthma, congestive heart failure ? Call if blood sugars are elevated, persistent headache or dizziness, coughing or congestion, constipation or diarrhea, burning with urination, abnormal heart rate When to call 911and go to the nearest emergency room ? Acute onset of chest pain, shortness of breath, difficulty breathing Activity ? Weight bearing as tolerated with walker or crutches. Refer to pages 23-33 of your handbook for instructions and pictures ? Complete your Home Exercise Program daily as instructed by your therapist.  Refer to pages 36-42 of your handbook for instructions and pictures ? Get up every one hour and walk (except at night when sleeping) ? AVOID sudden and extreme movement of your hip (surgical leg) ? Do not drive or operate heavy machinery Incision Care ? The Aquacel (brown, waterproof) surgical dressing is to remain on your hip for 7 days. On the 7th day have someone gently peel the dressing off by carefully lifting the edge and stretching it slightly to break the adhesive seal 
? If you have steri-strips (small, white pieces of tape) on your incision, they may come off when you remove the Aquacel surgical dressing. This is okay. You may now leave your incision open to air ? If your Aquacel dressing comes loose/off before the 7th day, you may replace it with a dry sterile gauze dressing; change it daily.   Once your incision is not draining, you may leave it open to air ? You may take a shower with the Aquacel dressing in place. Once the Aquacel is removed, you may shower and get your incision wet but do not submerge your incision under water in a bath tub, hot tub or swimming pool for 6 weeks after surgery. Preventing blood clots ? Take Aspirinas prescribed by Dr. Stephany Damon for one month following surgery Pain management ? Take pain medication as prescribed with food & full glass of fluid; decrease the amount you use as your pain lessens ? Avoid alcoholic beverages while taking pain medication ? Do not take any over-the-counter medication except for Tylenol (acetaminophen) ? Please be aware that many medications contain Tylenol. We do not want you to over medicate so please read the information below as a guide. Do not take more than 4 Grams of Tylenol in a 24 hour period. (There are 1000 milligrams in one Gram) 
o 325 mg of Tylenol per tablet (do not take more than 12 tablets in 24 hours) 
o 500 mg of Tylenol per tablet (do not take more than 8 tablets in 24 hours) 
o 650 mg of Tylenol per tablet (do not take more than 5 tablets in 24 hours). ? You may place an ice bag on your hip for 15-20 minutes after exercising and as needed throughout the day and night Diet Resume usual diet; drink plenty of fluids; eat foods high in fiber You may want to take a stool softener (such as Senokot-S or Colace) to prevent constipation while you are taking pain medication. If constipation occurs, take a laxative (such as Dulcolax tablets, Milk of Magnesia, or a suppository) Home Health Care Protocol (to be followed by [address]) Nursing-per physicians order Remove Aquacel dressing at 7 days post-op Complete head to toe assessment, vital signs Medication reconciliation Review pain management Manage chronic medical conditions Physical Therapy-per physicians order Weight bearing status: Left Side Weight Bearing: Full Right Side Weight Bearing: As tolerated Mobility Status: 
Supine to Sit: Supervision, Additional time Sit to Stand: Modified independent, Adaptive equipment, Additional time Sit to Supine: Supervision, Additional time, Adaptive equipment (using strap to assist the RLE) Gait: 
Distance (ft): 300 Feet (ft) Ambulation - Level of Assistance: Supervision, Additional time, Adaptive equipment Assistive Device: Gait belt, Crutches ADL status overall composite: 
  
  
  
  
  
 
Physical Therapy ? Assessment and evaluation-bed mobility; functional transfers (bed, chair, bathroom, stairs); ambulation with equipment, car transfers, safety and ability to get out of house in the event of an emergency ? AVOID sudden and extreme movement of the hip (surgical leg) ? Discuss pain management ? Review how to do ADLs. Refer to pages 43-47 of handbook Home Exercise Program-refer to pages 36-42 of handbook MyChart Announcement We are excited to announce that we are making your provider's discharge notes available to you in [employer]. You will see these notes when they are completed and signed by the physician that discharged you from your recent hospital stay. If you have any questions or concerns about any information you see in [employer], please call the Health Information Department where you were seen or reach out to your Primary Care Provider for more information about your plan of care. Introducing [facility]! Dear Sanjuanita Liu: Thank you for requesting a [employer] account. Our records indicate that you have previously registered for a [employer] account but its currently inactive. Please call our [employer] support line at [phone]. Additional Information If you have questions, please visit the Frequently Asked Questions section of the [employer] website at [email or web address] Remember, MyChart is NOT to be used for urgent needs. For medical emergencies, dial 911. Now available from your iPhone and Android! Providers Seen During Your Hospitalization Provider Specialty Primary office phone Donnie Martinez MD Orthopedic Surgery [phone] Your Primary Care Physician (PCP) Primary Care Physician Office Phone Office Fax [address], [address] [phone] You are allergic to the following No active allergies Recent Documentation Weight BMI Smoking Status 81.6 kg 26.58 kg/m2 Never Smoker Emergency Contacts Name Discharge Info Relation Home Work Mobile Lynn Nieto DISCHARGE CAREGIVER [3] Life Partner [7] [phone] Patient Belongings The following personal items are in your possession at time of discharge: 
  Dental Appliances: None  Visual Aid: Glasses      Home Medications: None   Jewelry: None  Clothing: Other (comment) (Clothes and shoes to pacu)    Other Valuables: Eyeglasses Please provide this summary of care documentation to your next provider. Signatures-by signing, you are acknowledging that this After Visit Summary has been reviewed with you and you have received a copy. Patient Signature:  ____________________________________________________________ Date:  ____________________________________________________________  
  
Rona Rios Provider Signature:  ____________________________________________________________ Date:  ____________________________________________________________

## 2017-10-30 NOTE — PERIOP NOTES
TRANSFER - OUT REPORT:    Verbal report given to NURSE CRISPIN(name) on Bhanu Rose  being transferred to 562(unit) for routine post - op       Report consisted of patients Situation, Background, Assessment and   Recommendations(SBAR). Time Pre op antibiotic given:JUAN CARLOS 2 Rita@FIXO  Anesthesia Stop time: 1669  Cordon Present on Transfer to floor:YES  Order for Cordon on Chart:YES    Information from the following report(s) SBAR, OR Summary, Intake/Output and MAR was reviewed with the receiving nurse. Opportunity for questions and clarification was provided. Is the patient on 02? YES       L/Min 2         Is the patient on a monitor? NO    Is the nurse transporting with the patient? NO    Surgical Waiting Area notified of patient's transfer from PACU? YES      The following personal items collected during your admission accompanied patient upon transfer:   Dental Appliance: Dental Appliances: None  Vision:    Hearing Aid:    Jewelry: Jewelry: None  Clothing: Clothing: Other (comment) (Clothes and shoes to pacu)  Other Valuables:  Other Valuables: Eyeglasses  Valuables sent to safe:

## 2017-10-30 NOTE — IP AVS SNAPSHOT
[address] 
[phone] Patient: Afshan Adamson MRN: [id number] MZR:7/42/6290 My Medications STOP taking these medications   
 mupirocin 2 % ointment Commonly known as:  BACTROBAN  
   
  
 traMADol 50 mg tablet Commonly known as:  ULTRAM  
   
  
  
TAKE these medications as instructed Instructions Each Dose to Equal  
 Morning Noon Evening Bedtime ALEVE 220 mg Cap Generic drug:  naproxen sodium Your last dose was: Your next dose is: Take  by mouth. AMLODIPINE BESYLATE (BULK) Your last dose was: Your next dose is:    
   
   
 5 mg by Does Not Apply route daily. 5 mg  
    
   
   
   
  
 aspirin delayed-release 325 mg tablet Your last dose was: Your next dose is: Take 1 Tab by mouth two (2) times a day. 325 mg  
    
   
   
   
  
 b complex vitamins tablet Your last dose was: Your next dose is: Take 1 Tab by mouth daily. 1 Tab  
    
   
   
   
  
 lisinopril 10 mg tablet Commonly known as:  Sanju Fox Your last dose was: Your next dose is: Take 1 Tab by mouth daily. 10 mg  
    
   
   
   
  
 metoprolol succinate 50 mg XL tablet Commonly known as:  TOPROL-XL Your last dose was: Your next dose is: Take 1 Tab by mouth daily. 50 mg  
    
   
   
   
  
 oxyCODONE IR 5 mg immediate release tablet Commonly known as:  Mariama Natarajan Your last dose was: Your next dose is: Take 1 Tab by mouth every three (3) hours as needed. Max Daily Amount: 40 mg.  
 5 mg Where to Get Your Medications These medications were sent to [facility] - [address], [address]  [address], [facility] Phone:  [phone] aspirin delayed-release 325 mg tablet Information on where to get these meds will be given to you by the nurse or doctor. ! Ask your nurse or doctor about these medications  
  oxyCODONE IR 5 mg immediate release tablet

## 2017-10-30 NOTE — ANESTHESIA POSTPROCEDURE EVALUATION
Post-Anesthesia Evaluation and Assessment    Patient: Trudy Sandhu MRN: [id number]  SSN: [id number]    YOB: 1955  Age: 58 y.o. Sex: male       Cardiovascular Function/Vital Signs  Visit Vitals    /90    Pulse 64    Temp 36.7 °C (98 °F)    Resp 12    Wt 81.6 kg (180 lb)    SpO2 97%    BMI 26.58 kg/m2       Patient is status post general anesthesia for Procedure(s):  RIGHT TOTAL HIP ARTHROPLASTY WITH  ANTERIOR APPROACH. Nausea/Vomiting: None    Postoperative hydration reviewed and adequate. Pain:  Pain Scale 1: Numeric (0 - 10) (10/30/17 0931)  Pain Intensity 1: 4 (10/30/17 0931)   Managed    Neurological Status:   Neuro (WDL): Within Defined Limits (10/30/17 0918)   At baseline    Mental Status and Level of Consciousness: Arousable    Pulmonary Status:   O2 Device: Nasal cannula (10/30/17 1346)   Adequate oxygenation and airway patent    Complications related to anesthesia: None    Post-anesthesia assessment completed.  No concerns    Signed By: Gabbie Ambriz MD     October 30, 2017

## 2017-10-30 NOTE — ROUTINE PROCESS
Patient: Sudhir Capone MRN: [id number]  SSN: [id number]   YOB: 1955  Age: 58 y.o. Sex: male     Patient is status post Procedure(s):  RIGHT TOTAL HIP ARTHROPLASTY WITH  ANTERIOR APPROACH. Surgeon(s) and Role:     * Gurpreet Waldrop MD - Primary    Local/Dose/Irrigation:  70 mL optime mixture: 20 mL 0.9% NS, 20 mL exparel, 30 mL 0.5% bupiv. Peripheral IV 10/30/17 Left Wrist (Active)   Site Assessment Clean, dry, & intact 10/30/2017  9:30 AM   Phlebitis Assessment 0 10/30/2017  9:30 AM   Infiltration Assessment 0 10/30/2017  9:30 AM   Dressing Status Clean, dry, & intact 10/30/2017  9:30 AM   Dressing Type Transparent 10/30/2017  9:30 AM   Hub Color/Line Status Green; Infusing 10/30/2017  9:30 AM            Airway - Endotracheal Tube 10/30/17 (Active)                   Dressing/Packing:  Wound Hip Right-DRESSING TYPE: Other (Comment) (prineo, aquacel) (10/30/17 0623)  Splint/Cast:  ]    Other:  Cordon

## 2017-10-31 VITALS
DIASTOLIC BLOOD PRESSURE: 76 MMHG | WEIGHT: 180 LBS | TEMPERATURE: 98.3 F | BODY MASS INDEX: 26.58 KG/M2 | OXYGEN SATURATION: 98 % | SYSTOLIC BLOOD PRESSURE: 131 MMHG | HEART RATE: 80 BPM | RESPIRATION RATE: 16 BRPM

## 2017-10-31 LAB
ANION GAP SERPL CALC-SCNC: 7 MMOL/L (ref 5–15)
BUN SERPL-MCNC: 21 MG/DL (ref 6–20)
BUN/CREAT SERPL: 16 (ref 12–20)
CALCIUM SERPL-MCNC: 8.2 MG/DL (ref 8.5–10.1)
CHLORIDE SERPL-SCNC: 111 MMOL/L (ref 97–108)
CO2 SERPL-SCNC: 25 MMOL/L (ref 21–32)
CREAT SERPL-MCNC: 1.33 MG/DL (ref 0.7–1.3)
GLUCOSE SERPL-MCNC: 134 MG/DL (ref 65–100)
HGB BLD-MCNC: 11.2 G/DL (ref 12.1–17)
POTASSIUM SERPL-SCNC: 3.6 MMOL/L (ref 3.5–5.1)
SODIUM SERPL-SCNC: 143 MMOL/L (ref 136–145)

## 2017-10-31 PROCEDURE — 80048 BASIC METABOLIC PNL TOTAL CA: CPT | Performed by: ORTHOPAEDIC SURGERY

## 2017-10-31 PROCEDURE — G8988 SELF CARE GOAL STATUS: HCPCS

## 2017-10-31 PROCEDURE — 74011250636 HC RX REV CODE- 250/636: Performed by: ORTHOPAEDIC SURGERY

## 2017-10-31 PROCEDURE — 97110 THERAPEUTIC EXERCISES: CPT

## 2017-10-31 PROCEDURE — 97165 OT EVAL LOW COMPLEX 30 MIN: CPT

## 2017-10-31 PROCEDURE — 74011250637 HC RX REV CODE- 250/637: Performed by: ORTHOPAEDIC SURGERY

## 2017-10-31 PROCEDURE — 97116 GAIT TRAINING THERAPY: CPT

## 2017-10-31 PROCEDURE — 97535 SELF CARE MNGMENT TRAINING: CPT

## 2017-10-31 PROCEDURE — 85018 HEMOGLOBIN: CPT | Performed by: ORTHOPAEDIC SURGERY

## 2017-10-31 PROCEDURE — 36415 COLL VENOUS BLD VENIPUNCTURE: CPT | Performed by: ORTHOPAEDIC SURGERY

## 2017-10-31 PROCEDURE — G8987 SELF CARE CURRENT STATUS: HCPCS

## 2017-10-31 PROCEDURE — G8989 SELF CARE D/C STATUS: HCPCS

## 2017-10-31 RX ORDER — SODIUM CHLORIDE 9 MG/ML
125 INJECTION, SOLUTION INTRAVENOUS CONTINUOUS
Status: DISCONTINUED | OUTPATIENT
Start: 2017-10-31 | End: 2017-10-31 | Stop reason: HOSPADM

## 2017-10-31 RX ORDER — ASPIRIN 325 MG
325 TABLET, DELAYED RELEASE (ENTERIC COATED) ORAL 2 TIMES DAILY
Qty: 1 TAB | Refills: 0 | Status: SHIPPED | OUTPATIENT
Start: 2017-10-31 | End: 2018-05-29 | Stop reason: ALTCHOICE

## 2017-10-31 RX ORDER — OXYCODONE HYDROCHLORIDE 5 MG/1
5 TABLET ORAL
Qty: 80 TAB | Refills: 0 | Status: SHIPPED | OUTPATIENT
Start: 2017-10-31 | End: 2018-08-28 | Stop reason: ALTCHOICE

## 2017-10-31 RX ADMIN — CEFAZOLIN 2 G: 1 INJECTION, POWDER, FOR SOLUTION INTRAMUSCULAR; INTRAVENOUS; PARENTERAL at 02:59

## 2017-10-31 RX ADMIN — ACETAMINOPHEN 650 MG: 325 TABLET ORAL at 01:36

## 2017-10-31 RX ADMIN — KETOROLAC TROMETHAMINE 30 MG: 30 INJECTION, SOLUTION INTRAMUSCULAR at 12:19

## 2017-10-31 RX ADMIN — KETOROLAC TROMETHAMINE 30 MG: 30 INJECTION, SOLUTION INTRAMUSCULAR at 07:24

## 2017-10-31 RX ADMIN — OXYCODONE HYDROCHLORIDE 5 MG: 5 TABLET ORAL at 08:42

## 2017-10-31 RX ADMIN — Medication 10 ML: at 06:14

## 2017-10-31 RX ADMIN — KETOROLAC TROMETHAMINE 30 MG: 30 INJECTION, SOLUTION INTRAMUSCULAR at 01:06

## 2017-10-31 RX ADMIN — OXYCODONE HYDROCHLORIDE 10 MG: 5 TABLET ORAL at 12:20

## 2017-10-31 RX ADMIN — DOCUSATE SODIUM AND SENNOSIDES 1 TABLET: 8.6; 5 TABLET, FILM COATED ORAL at 08:42

## 2017-10-31 RX ADMIN — ACETAMINOPHEN 650 MG: 325 TABLET ORAL at 07:24

## 2017-10-31 RX ADMIN — POLYETHYLENE GLYCOL 3350 17 G: 17 POWDER, FOR SOLUTION ORAL at 08:42

## 2017-10-31 RX ADMIN — ASPIRIN 325 MG: 325 TABLET, DELAYED RELEASE ORAL at 08:42

## 2017-10-31 NOTE — PROGRESS NOTES
I have reviewed discharge instructions with the patient. The patient verbalized understanding.  Patient leaving to go home with friend by car; patient signed electronically; patient leaving with copy of discharge instructions, 1 prescription filled by bedside pharmacy and OTC ASA from pharmacy; extra ice packs, personal belongings, own crutches, PCT to assist patient to discharge area

## 2017-10-31 NOTE — PROGRESS NOTES
Bedside shift change report given to Karla (oncoming nurse) by Sanjiv Lowe (offgoing nurse). Report included the following information SBAR and Kardex.

## 2017-10-31 NOTE — PROGRESS NOTES
Problem: Mobility Impaired (Adult and Pediatric)  Goal: *Acute Goals and Plan of Care (Insert Text)  Physical Therapy Goals  Initiated 10/30/2017    1. Patient will move from supine to sit and sit to supine , scoot up and down and roll side to side in bed with independence within 4 days. 2. Patient will perform sit to stand with modified independence within 4 days. 3. Patient will ambulate with modified independence for 200 feet with the least restrictive device within 4 days. 4. Patient will ascend/descend 3 stairs with 2 handrail(s) with modified independence within 4 days. 5. Patient will perform hip home exercise program per protocol with modified independence within 4 days. physical Therapy TREATMENT  Patient: Max Oneal (47 y.o. male)  Date: 10/31/2017  Diagnosis: OA RIGHT HIP  Osteoarthritis of one hip, right <principal problem not specified>  Procedure(s) (LRB):  RIGHT TOTAL HIP ARTHROPLASTY WITH  ANTERIOR APPROACH (Right) 1 Day Post-Op  Precautions:      ASSESSMENT:  Patient eager to get up and mobilize and requests crutches for assistive device at home and brought his own from home. Crutches were fit to him and he was trained in use of crutches and was able to ambulate without assistance needing reminders for slowing his pace and consistent sequencing of the crutches. Reviewed all bed exercises and he performed them without difficulty. He was also instructed in the use of a strap to assist the RLE into the bed. He had no difficulty with stairs using railing and crutches. Patient is planning to remain for the day since his ride is not available until the afternoon. We will follow up in the afternoon, but he is clear for D/C from a PT standpoint and RN is aware.   Progression toward goals:  [x]      Improving appropriately and progressing toward goals  []      Improving slowly and progressing toward goals  []      Not making progress toward goals and plan of care will be adjusted PLAN:  Patient continues to benefit from skilled intervention to address the above impairments. Continue treatment per established plan of care. Discharge Recommendations:  Home Health  Further Equipment Recommendations for Discharge:  none     SUBJECTIVE:   Patient stated I am ready to go.     OBJECTIVE DATA SUMMARY:   Critical Behavior:              Functional Mobility Training:  Bed Mobility:     Supine to Sit: Supervision; Additional time  Sit to Supine: Supervision; Additional time; Adaptive equipment (using strap to assist the RLE)           Transfers:  Sit to Stand: Modified independent; Adaptive equipment; Additional time  Stand to Sit: Modified independent; Adaptive equipment; Additional time                             Balance:  Sitting: Intact; Without support  Standing: Intact; With support  Ambulation/Gait Training:  Distance (ft): 300 Feet (ft)  Assistive Device: Gait belt;Crutches  Ambulation - Level of Assistance: Supervision; Additional time; Adaptive equipment           Right Side Weight Bearing: As tolerated  Left Side Weight Bearing: Full        Speed/Aysha: Fluctuations  Step Length: Left shortened;Right shortened        Interventions: Safety awareness training; Tactile cues; Verbal cues; Visual/Demos           Stairs:  Number of Stairs Trained: 8  Stairs - Level of Assistance: Supervision; Adaptive equipment; Additional time  Rail Use:  (none with crutches and right with crutches)    Therapeutic Exercises:   SUPINE  EXERCISES   Sets   Reps   Active Active Assist   Passive Self ROM   Comments   Ankle Pumps   [x]                                        []                                        []                                        []                                           Quad Sets   [x]                                        []                                        []                                        []                                           Heel Slides   [x] []                                        []                                        []                                           Hip Abduction   []                                        []                                        []                                        []                                           Glut Sets   [x]                                        []                                        []                                        []                                              []                                        []                                        []                                        []                                              []                                        []                                        []                                        []                                             STANDING  EXERCISES   Sets   Reps   Active Active Assist   Passive Self ROM   Comments   Heel Raises   []                                        []                                        []                                        []                                           Hip Abduction   []                                        []                                        []                                        []                                              []                                        []                                        []                                        []                                              []                                        []                                        []                                        []                                             Pain:  Pain Scale 1: Numeric (0 - 10) (b4 thearpy)  Pain Intensity 1: 1           Pain Intervention(s) 1: Medication (see MAR); Ice positioning  Activity Tolerance:   Good, with minimal pain noted with ambulation  Please refer to the flowsheet for vital signs taken during this treatment.   After treatment:   [] Patient left in no apparent distress sitting up in chair  [x] Patient left in no apparent distress in bed  [x] Call bell left within reach  [x] Nursing notified  [] Caregiver present  [] Bed alarm activated    COMMUNICATION/COLLABORATION:   The patients plan of care was discussed with: Registered Nurse    Day Nguyen, PT   Time Calculation: 30 mins

## 2017-10-31 NOTE — PROGRESS NOTES
Physical Therapy  10/31/2017    Followed up with pt this afternoon prior to d/c (pt cleared PT this morning). Pt politely deferring PT this afternoon, stated \"I'm good\". Provided education in regards to activity pacing. Pt remained supine in bed w/ all needs in reach;PCT present at bedside for VS. Will follow up w/pt if there are any changes in d/c.       Rhonda Means, PTA

## 2017-10-31 NOTE — PROGRESS NOTES
Care Management Interventions  PCP Verified by CM: Yes Pat Delaney MD)  Mode of Transport at Discharge: Other (see comment)  Transition of Care Consult (CM Consult): [address]: No  Reason Outside [place]: Physician referred to specific agency  Physical Therapy Consult: Yes  Current Support Network: Other  Confirm Follow Up Transport: Friends  Plan discussed with Pt/Family/Caregiver: Yes  Freedom of Choice Offered: Yes  Discharge Location  Discharge Placement: Home with home health    CM met with patient. Patient lives with his life partner Kay Marialuisa. Patient expects return to independent functioning. Patient confirmed PCP, health insurance, and prescription coverage. Transport at discharge will be provided by a friend. CM received consult for home health services. CM offered patient choice of providers. Patient selected [facility] and signed [place] of Choice form which CM placed on chart. CM sent referral via [employer] to [facility], and referral was accepted. CM updated AVS and informed patient and staff nurse.

## 2017-10-31 NOTE — PROGRESS NOTES
Problem: Discharge Planning  Goal: *Discharge to safe environment  Outcome: Progressing Towards Goal  Home with home health.

## 2017-10-31 NOTE — DISCHARGE INSTRUCTIONS
After Hospital Care Plan:  Discharge Instructions Anterior Approach   Hip Replacement-Dr. Harika Locke    Patient Mookie Balderrama  Date of procedure:10/30/2017    Procedure:Procedure(s):  RIGHT TOTAL HIP ARTHROPLASTY WITH  ANTERIOR APPROACH  Surgeon:Surgeon(s) and Role:     * Micki Garcia MD - Primary   PCP: Missael Haas MD  Date of discharge: No discharge date for patient encounter. Follow up appointments   Follow up with Dr. Harika Locke in 3 weeks. Call [phone] to make an appointment.  If home health has been arranged for you the agency will contact you to arrange dates/times for visits. Please call them if you do not hear from them within 24 hours after you are discharged    When to call your Orthopaedic Surgeon: Call [phone]. If you need to reach us after 5pm or on a weekend; call [phone] and the on call physician will be contacted   Increased hip pain, unrelieved pain or if you have difficulty or are unable to walk   Signs of infection-if your incision is red; continues to have drainage; drainage has a foul odor or if you have a persistent fever over 101 degrees   Signs of a blood clot in your leg-calf pain, tenderness, redness, swelling of lower leg    When to call your Primary Care Physician:   Concerns about medical conditions such as diabetes, high blood pressure, asthma, congestive heart failure   Call if blood sugars are elevated, persistent headache or dizziness, coughing or congestion, constipation or diarrhea, burning with urination, abnormal heart rate    When to call [id number] go to the nearest emergency room   Acute onset of chest pain, shortness of breath, difficulty breathing    Activity   Weight bearing as tolerated with walker or crutches.  Refer to pages 23-33 of your handbook for instructions and pictures   Complete your Home Exercise Program daily as instructed by your therapist.  Refer to pages 36-42 of your handbook for instructions and pictures   Get up every one hour and walk (except at night when sleeping)   AVOID sudden and extreme movement of your hip (surgical leg)   Do not drive or operate heavy machinery    Incision Care     The Aquacel (brown, waterproof) surgical dressing is to remain on your hip for 7 days. On the 7th day have someone gently peel the dressing off by carefully lifting the edge and stretching it slightly to break the adhesive seal   If you have steri-strips (small, white pieces of tape) on your incision, they may come off when you remove the Aquacel surgical dressing. This is okay. You may now leave your incision open to air   If your Aquacel dressing comes loose/off before the 7th day, you may replace it with a dry sterile gauze dressing; change it daily. Once your incision is not draining, you may leave it open to air   You may take a shower with the Aquacel dressing in place. Once the Aquacel is removed, you may shower and get your incision wet but do not submerge your incision under water in a bath tub, hot tub or swimming pool for 6 weeks after surgery. Preventing blood clots   Take Aspirinas prescribed by Dr. Gabriel Ruiz for one month following surgery    Pain management   Take pain medication as prescribed with food & full glass of fluid; decrease the amount you use as your pain lessens   Avoid alcoholic beverages while taking pain medication   Do not take any over-the-counter medication except for Tylenol (acetaminophen)   Please be aware that many medications contain Tylenol. We do not want you to over medicate so please read the information below as a guide. Do not take more than 4 Grams of Tylenol in a 24 hour period. (There are 1000 milligrams in one Gram)  o 325 mg of Tylenol per tablet (do not take more than 12 tablets in 24 hours)  o 500 mg of Tylenol per tablet (do not take more than 8 tablets in 24 hours)  o 650 mg of Tylenol per tablet (do not take more than 5 tablets in 24 hours).    You may place an ice bag on your hip for 15-20 minutes after exercising and as needed throughout the day and night    Diet  Resume usual diet; drink plenty of fluids; eat foods high in fiber  You may want to take a stool softener (such as Senokot-S or Colace) to prevent constipation while you are taking pain medication. If constipation occurs, take a laxative (such as Dulcolax tablets, Milk of Magnesia, or a suppository)    [address] [facility] Protocol (to be followed by [address])    Nursing-per physicians order  Remove Aquacel dressing at 7 days post-op  Complete head to toe assessment, vital signs  Medication reconciliation  Review pain management  Manage chronic medical conditions    Physical Therapy-per physicians order    Weight bearing status:     Left Side Weight Bearing: Full  Right Side Weight Bearing: As tolerated    Mobility Status:  Supine to Sit: Supervision, Additional time  Sit to Stand: Modified independent, Adaptive equipment, Additional time  Sit to Supine: Supervision, Additional time, Adaptive equipment (using strap to assist the RLE)       Gait:  Distance (ft): 300 Feet (ft)  Ambulation - Level of Assistance: Supervision, Additional time, Adaptive equipment  Assistive Device: Gait belt, Crutches       ADL status overall composite:                   Physical Therapy   Assessment and evaluation-bed mobility; functional transfers (bed, chair, bathroom, stairs); ambulation with equipment, car transfers, safety and ability to get out of house in the event of an emergency   AVOID sudden and extreme movement of the hip (surgical leg)   Discuss pain management   Review how to do ADLs.   Refer to pages 43-47 of handbook    Home Exercise Program-refer to pages 36-42 of handbook

## 2017-10-31 NOTE — DISCHARGE SUMMARY
@8FICV@ [address] [place] [address]    DISCHARGE SUMMARY     Patient: Afshan Adamson                             Medical Record Number: [id number]                : 1955  Age: 58 y.o. Admit Date: 10/30/2017  Discharge Date:   Admission Diagnosis: OA RIGHT HIP  Osteoarthritis of one hip, right  Discharge Diagnosis: OA RIGHT HIP  Procedures: Procedure(s):  RIGHT TOTAL HIP ARTHROPLASTY WITH  ANTERIOR APPROACH  Surgeon: Sylvester Hodgkins  Assistants: none  Anesthesia: general  Complications: None     History of Present Illness:  Afshan Adamson is a 58 y.o. male with a history of Right hip pain, swelling, and marked loss of function. Despite conservative management and after clinical and radiographic evaluation, it was determined that he suffered from end-stage osteoarthritis and would benefit from Procedure(s):  RIGHT TOTAL HIP ARTHROPLASTY WITH  ANTERIOR APPROACH, which he consented to undergo after a discussion of the risks, benefits, alternatives, rehab concerns, and potential complications of surgery. Hospital Course: Afshan Adamson tolerated the procedure well. He was transferred  to the recovery room in stable condition. After a brief stay the patient was then transferred to the Joint Replacement Unit at [address] [place] [address].  On postoperative day #1, the dressing was clean and dry, he was neurovascularly intact. The patient was afebrile and vital signs were stable. Calves were soft and non-tender bilaterally. On postoperative day  # 1, the patient was tolerating a regular diet and making satisfactory progress with physical therapy. Hemoglobin and INR prior to discharge were   Lab Results   Component Value Date/Time    HGB 11.2 10/31/2017 06:09 AM    INR 1.0 10/05/2017 08:50 AM   .  Afshan Adamson made satisfactory progress with physical therapy and was discharged to Home in stable condition on postoperative day 1.   He was provided with routine postoperative instructions and advised to follow up in my office in 3 weeks following discharge from the hospital.  He was prescribed ASA for DVT prophylaxis and oxycodone for post-operative pain. Discharge Medications:  Current Discharge Medication List      START taking these medications    Details   oxyCODONE IR (ROXICODONE) 5 mg immediate release tablet Take 1 Tab by mouth every three (3) hours as needed. Max Daily Amount: 40 mg.  Qty: 80 Tab, Refills: 0      aspirin delayed-release 325 mg tablet Take 1 Tab by mouth two (2) times a day. Qty: 1 Tab, Refills: 0         CONTINUE these medications which have NOT CHANGED    Details   AMLODIPINE BESYLATE, BULK, 5 mg by Does Not Apply route daily. metoprolol succinate (TOPROL-XL) 50 mg XL tablet Take 1 Tab by mouth daily. Qty: 30 Tab, Refills: 3      lisinopril (PRINIVIL, ZESTRIL) 10 mg tablet Take 1 Tab by mouth daily. Qty: 30 Tab, Refills: 5    Associated Diagnoses: Elevated BP without diagnosis of hypertension      naproxen sodium (ALEVE) 220 mg cap Take  by mouth.      b complex vitamins tablet Take 1 Tab by mouth daily.          STOP taking these medications       mupirocin (BACTROBAN) 2 % ointment Comments:   Reason for Stopping:         traMADol (ULTRAM) 50 mg tablet Comments:   Reason for Stopping:               Signed by: Adam Mejias MD  10/31/2017

## 2017-10-31 NOTE — PROGRESS NOTES
Occupational Therapy EVALUATION/discharge  Patient: Max Oneal (82 y.o. male)  Date: 10/31/2017  Primary Diagnosis: OA RIGHT HIP  Osteoarthritis of one hip, right  Procedure(s) (LRB):  RIGHT TOTAL HIP ARTHROPLASTY WITH  ANTERIOR APPROACH (Right) 1 Day Post-Op   Precautions: Fall    ASSESSMENT:   Based on the objective data described below, the patient presents with reduced activity tolerance, reduced AROM of RLE, and increased pain limiting safety and independence with ADLs and functional mobility tasks. Pt completed all session activities with no more than Min A to adjust pant leg during LB dressing. Pt did not want to purchase hip kit, stating that he bought equipment prior to surgery. Pt demonstrated good dynamic balance while completing functional mobility tasks using crutches, but required occasional cues to use crutches when moving to more safety complete tasks. No LOB episodes observed during all functional mobility tasks and while standing for completion of LB dressing task. Pt was able to demonstrate the use of compensatory techniques for dressing, but did not want to use equipment that was presented to the pt. Discussed with pt the use of AE to reduce pain and allow for completion of activities with less assistance, but pt insisted that he would use the resources he has available to him at home. Pt displays good potential for recovery based on observations and pt report of supports in place at home. Pt does not require additional skilled acute OT services at this time. Discharge recommendation for home with family support as needed. Further skilled acute occupational therapy is not indicated at this time. Discharge Recommendations: Home with family support as needed. Further Equipment Recommendations for Discharge: None      SUBJECTIVE:   Patient stated I could barely move my leg before the surgery.     OBJECTIVE DATA SUMMARY:   HISTORY:   Past Medical History:   Diagnosis Date    Arrhythmia AFIB    Chronic pain     RIGHT HIP    Hypertension     Nausea & vomiting     Pulmonary embolism (HCC)      Past Surgical History:   Procedure Laterality Date    HX HEENT      SEPTOPLASTY    HX TONSILLECTOMY         Prior Level of Function/Home Situation: Independent-Mod I with all ADLs at baseline and employed. Expanded or extensive additional review of patient history:     Home Situation  Home Environment: Private residence  # Steps to Enter: 2  One/Two Story Residence: Two story  Living Alone: No  Support Systems: Family member(s)  Patient Expects to be Discharged to[de-identified] Private residence  Current DME Used/Available at Home: Cane, straight  Tub or Shower Type: Shower  []  Right hand dominant   []  Left hand dominant    EXAMINATION OF PERFORMANCE DEFICITS:  Cognitive/Behavioral Status:  Neurologic State: Alert  Orientation Level: Oriented X4  Cognition: Appropriate decision making; Appropriate for age attention/concentration  Perception: Appears intact  Perseveration: No perseveration noted  Safety/Judgement: Awareness of environment    Skin: Visible skin intact. Surgical dressing clean and dry. Edema: Increased edema observed in R thigh. Hearing: Auditory  Auditory Impairment: None    Vision/Perceptual:            Acuity: Within Defined Limits    Corrective Lenses: Glasses    Range of Motion:  AROM: Within functional limits      Strength:  Strength: Within functional limits      Coordination:  Coordination: Within functional limits  Fine Motor Skills-Upper: Left Intact; Right Intact    Gross Motor Skills-Upper: Left Intact; Right Intact    Tone & Sensation:  Tone: Normal  Sensation: Intact        Balance:  Sitting: Intact  Standing: Intact; With support    Functional Mobility and Transfers for ADLs:  Bed Mobility:  Supine to Sit: Supervision  Sit to Supine: Supervision; Additional time; Adaptive equipment (using strap to assist the RLE)  Scooting: Supervision    Transfers:  Sit to Stand: Contact guard assistance  Stand to Sit: Contact guard assistance  Toilet Transfer : Contact guard assistance    ADL Assessment:  Feeding: Independent    Oral Facial Hygiene/Grooming: Setup;Supervision    Bathing: Minimum assistance    Upper Body Dressing: Setup    Lower Body Dressing: Minimum assistance    Toileting: Supervision        ADL Intervention and task modifications:   Pt encountered supine in bed with HOB elevated. Pt's friend was present during session. Pt transitioned from supine to sitting at EOB with supervision. Pt completed sit to stand with CGA and use of crutches. Pt performed functional mobility task to bathroom with CGA and use of crutches. Pt completed toilet task with supervision from standing. Pt performed grooming task with setup/supervision standing at sink. Pt returned to room with supervision and crutches, performing stand to sit transfer to chair with CGA. Pt performed UB dressing with setup. Pt complete LB dressing with Min A to help guide R foot through pant leg. Pt left in no apparent distress seated in chair with call bell within reach. Grooming  Grooming Assistance: Supervision/set up  Washing Hands: Supervision/set-up  Brushing Teeth: Supervision/set-up       Upper Body Dressing Assistance  Dressing Assistance: Supervision/set-up  Pullover Shirt: Supervision/set-up    Lower Body Dressing Assistance  Dressing Assistance: Minimum assistance  Underpants: Supervision/set-up; Compensatory technique training  Pants With Elastic Waist: Minimum assistance; Compensatory technique training  Leg Crossed Method Used: No  Position Performed: Seated in chair  Adaptive Equipment Used: Dressing stick; Walker    Toileting  Toileting Assistance: Supervision/set up  Bladder Hygiene: Supervision/set-up  Clothing Management: Supervision/set-up  Adaptive Equipment: Walker    Cognitive Retraining  Safety/Judgement: Awareness of environment    Functional Measure:  Barthel Index:    Bathin  Bladder: 10  Bowels: 10  Groomin  Dressin  Feeding: 10  Mobility: 15  Stairs: 5  Toilet Use: 10  Transfer (Bed to Chair and Back): 10  Total: 80       Barthel and G-code impairment scale:  Percentage of impairment CH  0% CI  1-19% CJ  20-39% CK  40-59% CL  60-79% CM  80-99% CN  100%   Barthel Score 0-100 100 99-80 79-60 59-40 20-39 1-19   0   Barthel Score 0-20 20 17-19 13-16 9-12 5-8 1-4 0      The Barthel ADL Index: Guidelines  1. The index should be used as a record of what a patient does, not as a record of what a patient could do. 2. The main aim is to establish degree of independence from any help, physical or verbal, however minor and for whatever reason. 3. The need for supervision renders the patient not independent. 4. A patient's performance should be established using the best available evidence. Asking the patient, friends/relatives and nurses are the usual sources, but direct observation and common sense are also important. However direct testing is not needed. 5. Usually the patient's performance over the preceding 24-48 hours is important, but occasionally longer periods will be relevant. 6. Middle categories imply that the patient supplies over 50 per cent of the effort. 7. Use of aids to be independent is allowed. Rony Prince., Barthel, D.W. ([id number]). Functional evaluation: the Barthel Index. [facility] (14)2. ERIC Wolff, Carmina Medina, Brandyn Schulte., [place], [address] [place] [address] (). Measuring the change indisability after inpatient rehabilitation; comparison of the responsiveness of the Barthel Index and Functional [place] Measure. Journal of Neurology, Neurosurgery, and Psychiatry, 66(4), 741-739. Jack Fischer, N.J.A, ELIANE Das, & Bebeto Marcano MGABE. (2004.) Assessment of post-stroke quality of life in cost-effectiveness studies: The usefulness of the Barthel Index and the EuroQoL-5D. Quality of Life Research, 13, 222-25       G codes:   In compliance with CMSs Claims Based Outcome Reporting, the following G-code set was chosen for this patient based on their primary functional limitation being treated: The outcome measure chosen to determine the severity of the functional limitation was the Barthel Index with a score of 80/100 which was correlated with the impairment scale. ? Self Care:     - CURRENT STATUS: CI - 1%-19% impaired, limited or restricted    - GOAL STATUS: CI - 1%-19% impaired, limited or restricted    - D/C STATUS:  CI - 1%-19% impaired, limited or restricted       Occupational Therapy Evaluation Charge Determination   History Examination Decision-Making   LOW Complexity : Brief history review  LOW Complexity : 1-3 performance deficits relating to physical, cognitive , or psychosocial skils that result in activity limitations and / or participation restrictions  LOW Complexity : No comorbidities that affect functional and no verbal or physical assistance needed to complete eval tasks       Based on the above components, the patient evaluation is determined to be of the following complexity level: LOW   Pain:Pain Scale 1: Numeric (0 - 10)  Pain Intensity 1: 7  Pain Location 1: Hip  Pain Orientation 1: Left  Pain Description 1: Aching  Pain Intervention(s) 1: Medication (see MAR)  Activity Tolerance:   Pt tolerated all session activities with vital signs stable and no signs and symptoms of orthostatic hypotension or distress. After treatment:   [x]  Patient left in no apparent distress sitting up in chair  []  Patient left in no apparent distress in bed  [x]  Call bell left within reach  []  Nursing notified  []  Caregiver present  []  Bed alarm activated    COMMUNICATION/EDUCATION:   Communication/Collaboration:  [x]      Home safety education was provided and the patient/caregiver indicated understanding. [x]      Patient/family have participated as able and agree with findings and recommendations.   []      Patient is unable to participate in plan of care at this time. Findings and recommendations were discussed with: Physical Therapist and Registered Nurse    ABRAHAM Quesada    Regarding student involvement in patient care:  A student participated in this treatment session. Per CMS Medicare statements and AOTA guidelines I certify that the following was true:  1. I was present and directly observed the entire session. 2. I made all skilled judgments and clinical decisions regarding care. 3. I am the practitioner responsible for assessment, treatment, and documentation.       Ami Pulliam OT

## 2017-10-31 NOTE — PROGRESS NOTES
Complaints: none   Events: none      GEN:  NAD. AOx3   ABD:  S/NT/ND   RLE:  Dressing C/D/I    5/5 motor    Calf nttp (Bilat)    Sensate all distribution to light touch    1+ dp/pt pulses, foot perfused      Lab Results   Component Value Date/Time    HGB 11.2 10/31/2017 06:09 AM    INR 1.0 10/05/2017 08:50 AM       Lab Results   Component Value Date/Time    Sodium 143 10/31/2017 06:09 AM    Potassium 3.6 10/31/2017 06:09 AM    Chloride 111 10/31/2017 06:09 AM    CO2 25 10/31/2017 06:09 AM    BUN 21 10/31/2017 06:09 AM    Creatinine 1.33 10/31/2017 06:09 AM    Calcium 8.2 10/31/2017 06:09 AM            POD #1 RIGHT TOTAL HIP REPLACEMENT. Satisfactory progress. ABX: Complete today  PATHWAY: D/C [facility] per protocol  DVT Prophylaxis: ASA   Weight Bearing: WBAT RLE   Pain Control: PRN oral narcotics, celebrex  Anticipated Discharge Date: today   Disposition: Home, PT.

## 2018-05-21 ENCOUNTER — TELEPHONE (OUTPATIENT)
Dept: CARDIOLOGY CLINIC | Age: 63
End: 2018-05-21

## 2018-05-21 NOTE — TELEPHONE ENCOUNTER
Pt called requesting a sooner appt than first available.   Pt can be reached at #[phone]( [phone])   Thanks

## 2018-05-25 NOTE — TELEPHONE ENCOUNTER
Pt called stating he is returning a call regarding an appt this coming Tuesday someone called him about. Pt can be reached at #[phone].   Thanks

## 2018-05-29 ENCOUNTER — OFFICE VISIT (OUTPATIENT)
Dept: CARDIOLOGY CLINIC | Age: 63
End: 2018-05-29

## 2018-05-29 VITALS
OXYGEN SATURATION: 97 % | DIASTOLIC BLOOD PRESSURE: 88 MMHG | HEIGHT: 69 IN | WEIGHT: 198 LBS | HEART RATE: 61 BPM | BODY MASS INDEX: 29.33 KG/M2 | SYSTOLIC BLOOD PRESSURE: 120 MMHG

## 2018-05-29 DIAGNOSIS — R00.2 PALPITATIONS: Primary | ICD-10-CM

## 2018-05-29 DIAGNOSIS — I48.0 PAROXYSMAL ATRIAL FIBRILLATION (HCC): ICD-10-CM

## 2018-05-29 DIAGNOSIS — I10 HTN (HYPERTENSION), BENIGN: ICD-10-CM

## 2018-05-29 RX ORDER — LISINOPRIL 20 MG/1
20 TABLET ORAL DAILY
Qty: 30 TAB | Refills: 5
Start: 2018-05-29

## 2018-05-29 RX ORDER — METOPROLOL SUCCINATE 100 MG/1
100 TABLET, EXTENDED RELEASE ORAL
Qty: 30 TAB | Refills: 5
Start: 2018-05-29

## 2018-05-29 NOTE — MR AVS SNAPSHOT
2485 [facility] 644 [facility] [address] 
[phone] Patient: Yin Qureshi MRN: [id number] Visit Information Date & Time Provider Department Dept. Phone Encounter #  
 5/29/2018  1:00 PM Kenna Hernandez MD [facility] [place] [phone] [id number] Follow-up Instructions Return in about 3 months (around 8/29/2018). Upcoming Health Maintenance Date Due Hepatitis C Screening 1955 DTaP/Tdap/Td series (1 - Tdap) 9/16/1976 FOBT Q 1 YEAR AGE 50-75 9/16/2005 ZOSTER VACCINE AGE 60> 7/16/2015 Influenza Age 5 to Adult 8/1/2018 Allergies as of 5/29/2018  Review Complete On: 5/29/2018 By: Lynette Medina  
 No Known Allergies Current Immunizations  Never Reviewed No immunizations on file. Not reviewed this visit You Were Diagnosed With   
  
 Codes Comments Paroxysmal atrial fibrillation (HCC)    -  Primary ICD-10-CM: I48.0 ICD-9-CM: 427.31   
 HTN (hypertension), benign     ICD-10-CM: I10 
ICD-9-CM: 401.1 Vitals BP Pulse Height(growth percentile) Weight(growth percentile) SpO2 BMI  
 120/88 (BP 1 Location: Left arm, BP Patient Position: Sitting) 61 5' 9\" (1.753 m) 198 lb (89.8 kg) 97% 29.24 kg/m2 Smoking Status Never Smoker Vitals History BMI and BSA Data Body Mass Index Body Surface Area  
 29.24 kg/m 2 2.09 m 2 Preferred Pharmacy Pharmacy Name Phone [id number] - [address] [facility], [address] [place] [address] [phone] Your Updated Medication List  
  
   
This list is accurate as of 5/29/18  1:44 PM.  Always use your most recent med list.  
  
  
  
  
 ALEVE 220 mg Cap Generic drug:  naproxen sodium Take  by mouth. AMLODIPINE BESYLATE (BULK) 10 mg by Does Not Apply route daily. b complex vitamins tablet Take 1 Tab by mouth daily. lisinopril 10 mg tablet Commonly known as:  Dustin Hudson Take 1 Tab by mouth daily. metoprolol succinate 50 mg XL tablet Commonly known as:  TOPROL-XL Take 1 Tab by mouth daily. oxyCODONE IR 5 mg immediate release tablet Commonly known as:  Sheeba Tao Take 1 Tab by mouth every three (3) hours as needed. Max Daily Amount: 40 mg. We Performed the Following AMB POC EKG ROUTINE W/ 12 LEADS, INTER & REP [50323 CPT(R)] Follow-up Instructions Return in about 3 months (around 8/29/2018). Patient Instructions Stop Aspirin. Start Eliquis 5 mg twice daily. Self monitoring of your heart rate and rhythm is easy with ALIVECOR - visit  Informed Trades.LFS ([employer]).Healthpointz/en/ If you purchase the alivecor device, you can forward sample rhythms to me at [email or web address]. Please do not use this email for any urgent communication. [employer] is a chat platform to communicate between your doctor (myself) and you about any medical issues. It is free to join. Please use this for non urgent medical concerns. If you need me urgently, please call the office at [phone] or call 911 if it is an emergency Go to your mobile phone, your activation code specific to me is - [email or web address] Introducing hospitals & HEALTH SERVICES! Dear Yuliya Thomas: Thank you for requesting a Astaro account. Our records indicate that you have previously registered for a Astaro account but its currently inactive. Please call our Astaro support line at [phone]. Additional Information If you have questions, please visit the Frequently Asked Questions section of the Astaro website at [email or web address] LFS ([employer])/MGB Biopharmat/. Remember, Astaro is NOT to be used for urgent needs. For medical emergencies, dial 911. Now available from your iPhone and Android! Please provide this summary of care documentation to your next provider. Your primary care clinician is listed as Armani Padilla. If you have any questions after today's visit, please call [phone].

## 2018-05-29 NOTE — PROGRESS NOTES
Visit Vitals    /88 (BP 1 Location: Left arm, BP Patient Position: Sitting)    Pulse 61    Ht 5' 9\" (1.753 m)    Wt 198 lb (89.8 kg)    SpO2 97%    BMI 29.24 kg/m2

## 2018-05-29 NOTE — PROGRESS NOTES
Brionna Mccormickbobinder [address]al [address]  [address]  [address]  [place], [address] [facility]    Office [phone]  Fax [phone]  Cell [phone]        Bakari Ellis is a 58 y.o. male Last seen 9 months ago. Assessment  Encounter Diagnoses   Name Primary?  Paroxysmal atrial fibrillation (HCC)     HTN (hypertension), benign     Palpitations Yes       Recommendations:    1. PAF. Recurrent bouts of short lived palpitations may be AF. Recommend AliveCor monitoring to evaluate further. Consider AAD if documentation of AF. Favor stroke prevention with Eliquis 5 mg BID, stop ASA, even though his score = 1. He has no structural or ischemic heart disease by testing August 2017. Atrial fibrillation CHADSVASC2 score stroke risk:   58 y.o.                                        <65        + 0   male                                         Male     [de-identified]  CHF hx                                           No    + 0  HTN hx                                           Yes    +1  Stroke/TIA/Thromboembolism       No    +0  Vascular disease hx                       No    + 0  Diabetes Mellitus                            No    + 0   CHADSVASC2 score                    1      Annual Stroke Risk 0.6% - low-moderate risk       2. HTN. Normotensive on combination therapy. Follow-up Disposition:  Return in about 3 months (around 8/29/2018). Subjective:    Mr. Chan Flores underwent right hip replacement 10/2017. He has been experiencing almost daily, random episodes of chest pressure that last 5-10 minutes at a time. He will feel his heart beat during these episodes, but denies palpitations. He will feel tired afterwards and his hands and feet will swell. His BP during one of these episodes last night while watching tennis was 106/70, HR was 58. He states he has only had recurrent AF when he forgets to take Toprol. He stays active referring lacrosse often with no exertional sxs.  He also works the night shift at [employer]. He admits he has not been getting more than 5-6 hours a day. He drinks a beer every now and then. Patient denies any dyspnea, syncope, orthopnea or paroxysmal nocturnal dyspnea. He has a hx of PE 15 years ago. He was on Coumadin for 2 years after that. Cardiac risk factors   HTN no  DM no  Smoking no  Family hx of CAD yes    Cardiac testing  ETT 8/30/17 - 10:00. PMHR 169 (105%). Rest -104. Stress /120. No angina with exercise. Non-ischemic EKG with stress. Echo 8/30/17 - Ef 60-65%. No WMA. Mild LVH. Mild LAE. Small atrial septal aneurysm. Mild MR.  24 hour Holter monitor 9/11/17 - SR . Frequent PAC's. Non-sustained atrial tach vs. Afib with RVR. Past Medical History:   Diagnosis Date    Arrhythmia     AFIB    Chronic pain     RIGHT HIP    Hypertension     Nausea & vomiting     Pulmonary embolism (HCC)         Current Outpatient Prescriptions   Medication Sig Dispense Refill    AMLODIPINE BESYLATE, BULK, 10 mg by Does Not Apply route daily.  metoprolol succinate (TOPROL-XL) 50 mg XL tablet Take 1 Tab by mouth daily. (Patient taking differently: Take 100 mg by mouth nightly.) 30 Tab 3    lisinopril (PRINIVIL, ZESTRIL) 10 mg tablet Take 1 Tab by mouth daily. (Patient taking differently: Take 20 mg by mouth daily.) 30 Tab 5    naproxen sodium (ALEVE) 220 mg cap Take  by mouth.  b complex vitamins tablet Take 1 Tab by mouth daily.  oxyCODONE IR (ROXICODONE) 5 mg immediate release tablet Take 1 Tab by mouth every three (3) hours as needed. Max Daily Amount: 40 mg. 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 Review of Systems  Constitutional: Negative for fever, chills and diaphoresis. +fatigue  Respiratory: Negative for cough, hemoptysis, sputum production, shortness of breath and wheezing. Cardiovascular: Negative for palpitations, orthopnea, claudication and PND. +intermittent chest pressure, +hands and feet swelling  Gastrointestinal: Negative for heartburn, nausea, vomiting, blood in stool and melena. Genitourinary: Negative for dysuria and flank pain. Musculoskeletal: Negative for joint pain and back pain. Skin: Negative for rash. Neurological: Negative for focal weakness, seizures, loss of consciousness, weakness and headaches. Endo/Heme/Allergies: Does not bruise/bleed easily. Psychiatric/Behavioral: Negative for memory loss. The patient does not have insomnia. Physical Exam    Visit Vitals    /88 (BP 1 Location: Left arm, BP Patient Position: Sitting)    Pulse 61    Ht 5' 9\" (1.753 m)    Wt 198 lb (89.8 kg)    SpO2 97%    BMI 29.24 kg/m2     Wt Readings from Last 3 Encounters:   05/29/18 198 lb (89.8 kg)   10/30/17 180 lb (81.6 kg)   10/05/17 180 lb 8 oz (81.9 kg)      General - well developed well nourished  Neck - JVP normal, thyroid nl  Cardiac - normal S1, S2, no murmurs, rubs or gallops. No clicks  Vascular - carotids without bruits, radials, femorals and pedal pulses equal bilateral  Lungs - clear to auscultation bilaterals, no rales, wheezing or rhonchi  Abd - soft nontender, no HSM, no abd bruits  Extremities - no edema  Skin - no rash  Neuro - nonfocal  Psych - normal mood and affect      Cardiographics  EKG 8/14/17- SR, lateral T wave inversions, no previous EKG. EKG 5/29/18- SR 59, nonspecific lateral T wave inversions. No significant change from 8/14/17.          Written by Judit Stanley, dictated by Cecilia Medrano MD.  Cecilia Medrano MD

## 2018-05-29 NOTE — PATIENT INSTRUCTIONS
Stop Aspirin. Start Eliquis 5 mg twice daily. Self monitoring of your heart rate and rhythm is easy with ALIVECOR - visit  Blue Security.HealthUnlocked.[email or web address]. com/en/    If you purchase the alivecor device, you can forward sample rhythms to me at [email or web address]. Please do not use this email for any urgent communication. [employer] is a chat platform to communicate between your doctor (myself) and you about any medical issues. It is free to join. Please use this for non urgent medical concerns.  If you need me urgently, please call the office at [phone] or call 911 if it is an emergency    Go to your mobile phone, your activation code specific to me is - [email or web address]

## 2018-07-17 ENCOUNTER — TELEPHONE (OUTPATIENT)
Dept: CARDIOLOGY CLINIC | Age: 63
End: 2018-07-17

## 2018-07-17 NOTE — TELEPHONE ENCOUNTER
Pt have shoulder surgery under general anesthesia 7/25/18. Eliquis instructions requested by [address] Please advise.

## 2018-07-17 NOTE — TELEPHONE ENCOUNTER
Patient is due to have shoulder surgery under general anistesia 7/25/18 at [facility] and they need to know the instructions as far as the deejay   Phone: [phone]  Fax: [phone]

## 2018-07-25 ENCOUNTER — TELEPHONE (OUTPATIENT)
Dept: CARDIOLOGY CLINIC | Age: 63
End: 2018-07-25

## 2018-07-25 ENCOUNTER — DOCUMENTATION ONLY (OUTPATIENT)
Dept: CARDIOLOGY CLINIC | Age: 63
End: 2018-07-25

## 2018-07-25 NOTE — PROGRESS NOTES
Was contacted Dr. Adalberto Israel, anesthesia from [address] [place] [address]. Casey Bolton presented for pre-operative evaluation. Reported self stopping of DOAC. Also reported some palpitations about one month ago. Surgery was postponed. Patient instructed to f/u with Dr. Ashish Thibodeaux by anesthesia.

## 2018-07-25 NOTE — TELEPHONE ENCOUNTER
Pt called discuss his re-scheduled shoulder surgery due to a medication that he stopped because he didn't like it and the office who performing the procedure is afraid the pt will go into AFIB due to stopping that medication. Phone #[phone].   Thanks

## 2018-07-26 NOTE — TELEPHONE ENCOUNTER
Pt IDX2 pt stopped Eliquis on own 1 month ago and has surgery planned for yesterday but Anestesiologist cancelled surgery. Surgery rescheduled for Monday. Randell Barreto wants a ok to move forward with pt off Eliquis or does pt need a bridge for anticoagulant. Dr. Cierra Lizarraga ok'd surgery.   Will fax note to Surgeon [phone] attn:Alber

## 2018-07-26 NOTE — PROGRESS NOTES
Low cardiac risk for low risk orthopedic procedure (shoulder surgery). Has PAF with intermittent bouts of palpitations. No structural or ischemic heart disease by noninvasive testing Aug 2017. He stopped Eliquis on his own a month ago - this should have no bearing on his surgery. Resume Eliquis post op when safe to do so.  No bridging anticoagulation required

## 2018-08-01 ENCOUNTER — HOSPITAL ENCOUNTER (OUTPATIENT)
Dept: LAB | Age: 63
Discharge: HOME | End: 2018-08-01

## 2018-08-24 ENCOUNTER — TELEPHONE (OUTPATIENT)
Dept: CARDIOLOGY CLINIC | Age: 63
End: 2018-08-24

## 2018-08-24 NOTE — TELEPHONE ENCOUNTER
Pt called in regards to him taking eliquis and wanted to know due to him having a tooth extraction what would his recommendation be for him being that he is on a blood thinner.    Phone: [phone]

## 2018-08-24 NOTE — TELEPHONE ENCOUNTER
Spoke with patient . Has an appointment on 8/28 and will speak to Candance Corning about it then.     Also asked to have Dentist send in a request if needed for eliquis suggestion

## 2018-08-28 ENCOUNTER — OFFICE VISIT (OUTPATIENT)
Dept: CARDIOLOGY CLINIC | Age: 63
End: 2018-08-28

## 2018-08-28 VITALS
OXYGEN SATURATION: 96 % | HEIGHT: 69 IN | HEART RATE: 70 BPM | BODY MASS INDEX: 29.03 KG/M2 | RESPIRATION RATE: 16 BRPM | WEIGHT: 196 LBS | SYSTOLIC BLOOD PRESSURE: 122 MMHG | DIASTOLIC BLOOD PRESSURE: 80 MMHG

## 2018-08-28 DIAGNOSIS — I48.0 PAROXYSMAL ATRIAL FIBRILLATION (HCC): Primary | ICD-10-CM

## 2018-08-28 DIAGNOSIS — I10 HTN (HYPERTENSION), BENIGN: ICD-10-CM

## 2018-08-28 DIAGNOSIS — R53.82 CHRONIC FATIGUE: ICD-10-CM

## 2018-08-28 RX ORDER — AMLODIPINE BESYLATE 10 MG/1
10 TABLET ORAL
Refills: 3 | COMMUNITY
Start: 2018-05-31

## 2018-08-28 NOTE — PROGRESS NOTES
Brionna Zavaleta, [facility]  [address]  [address] [facility], [address] [facility]    Office [phone]  Fax [phone]  Cell [phone]        Oni Oviedo is a 58 y.o. male Last seen 3 months ago. Assessment  Encounter Diagnoses   Name Primary?  Paroxysmal atrial fibrillation (HCC) Yes    HTN (hypertension), benign     Chronic fatigue        Recommendations:    1. PAF. No recurrent sxs of AF. Continue Toprol and Eliquis. 2. HTN. Normotensive on combination therapy. 3. Fatigue, likely multifactorial. Advised him to shift dosing of Toprol to bedtime. I am not a big fan of night shift. I would rather him work a daytime job. Follow-up Disposition:  Return in about 6 months (around 2/28/2019). Subjective:  Mr. Mars Gallegos underwent left shoulder surgery one month ago by Dr. Brittanie Johnson. He has been active, but has not done formal PT. He was previously taking Oxycodone, but has discontinued. He has not had any spells of palpitations/A Fib. However, he reports significant fatigue associated with Toprol. He frequently has to work nights at his job. He is interested in switching to the day shift. Patient denies any exertional chest pain, dyspnea, syncope, orthopnea, edema or paroxysmal nocturnal dyspnea. Cardiac risk factors   HTN no  DM no  Smoking no  Family hx of CAD yes    Cardiac testing  ETT 8/30/17 - 10:00. PMHR 169 (105%). Rest -104. Stress /120. No angina with exercise. Non-ischemic EKG with stress. Echo 8/30/17 - Ef 60-65%. No WMA. Mild LVH. Mild LAE. Small atrial septal aneurysm. Mild MR.  24 hour Holter monitor 9/11/17 - SR . Frequent PAC's. Non-sustained atrial tach vs. Afib with RVR.     Past Medical History:   Diagnosis Date    Arrhythmia     AFIB    Chronic pain     RIGHT HIP    Hypertension     Nausea & vomiting     Pulmonary embolism (HCC)         Current Outpatient Prescriptions   Medication Sig Dispense Refill    amLODIPine (NORVASC) 10 mg tablet Take 10 mg by mouth daily. 3    apixaban (ELIQUIS) 5 mg tablet Take 1 Tab by mouth two (2) times a day. 60 Tab 3    metoprolol succinate (TOPROL-XL) 100 mg tablet Take 1 Tab by mouth nightly. 30 Tab 5    lisinopril (PRINIVIL, ZESTRIL) 20 mg tablet Take 1 Tab by mouth daily. 30 Tab 5    naproxen sodium (ALEVE) 220 mg cap Take 1 Tab by mouth as needed.  b complex vitamins tablet Take 1 Tab by mouth daily. 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 Review of Systems  Constitutional: Negative for fever, chills and diaphoresis. +fatigue  Respiratory: Negative for cough, hemoptysis, sputum production, shortness of breath and wheezing. Cardiovascular: Negative for palpitations, orthopnea, claudication and PND. Gastrointestinal: Negative for heartburn, nausea, vomiting, blood in stool and melena. Genitourinary: Negative for dysuria and flank pain. Musculoskeletal: Negative for joint pain and back pain. Skin: Negative for rash. Neurological: Negative for focal weakness, seizures, loss of consciousness, weakness and headaches. Endo/Heme/Allergies: Does not bruise/bleed easily. Psychiatric/Behavioral: Negative for memory loss. The patient does not have insomnia. Physical Exam    Visit Vitals    /80 (BP 1 Location: Left arm, BP Patient Position: Sitting)    Pulse 70    Resp 16    Ht 5' 9\" (1.753 m)    Wt 196 lb (88.9 kg)    SpO2 96%    BMI 28.94 kg/m2     Wt Readings from Last 3 Encounters:   08/28/18 196 lb (88.9 kg)   05/29/18 198 lb (89.8 kg)   10/30/17 180 lb (81.6 kg)      General - well developed well nourished  Neck - JVP normal, thyroid nl  Cardiac - normal S1, S2, no murmurs, rubs or gallops.  No clicks  Vascular - carotids without bruits, radials, femorals and pedal pulses equal bilateral  Lungs - clear to auscultation bilaterals, no rales, wheezing or rhonchi  Abd - soft nontender, no HSM, no abd bruits  Extremities - no edema  Skin - no rash  Neuro - nonfocal  Psych - normal mood and affect      Cardiographics  EKG 8/14/17- SR, lateral T wave inversions, no previous EKG. EKG 5/29/18- SR 59, nonspecific lateral T wave inversions. No significant change from 8/14/17. Written by Davis Hurd, as dictated by Dr. Sampson Husain.      Sampson Husain MD

## 2018-08-28 NOTE — MR AVS SNAPSHOT
[address] [facility] 600 [address] [facility] 
[phone] Patient: Afshan Adamson MRN: [id number] Visit Information Date & Time Provider Department Dept. Phone Encounter #  
 2018  3:40 PM Dorcas Stokes MD [facility] Swapnil Dumont [phone] [id number] Follow-up Instructions Return in about 6 months (around 2019). Upcoming Health Maintenance Date Due Hepatitis C Screening 1955 DTaP/Tdap/Td series (1 - Tdap) 1976 FOBT Q 1 YEAR AGE 50-75 2005 ZOSTER VACCINE AGE 60> 2015 Influenza Age 5 to Adult 2018 Allergies as of 2018  Review Complete On: 2018 By: Angelica Fierro LPN No Known Allergies Current Immunizations  Never Reviewed No immunizations on file. Not reviewed this visit You Were Diagnosed With   
  
 Codes Comments Paroxysmal atrial fibrillation (HCC)    -  Primary ICD-10-CM: I48.0 ICD-9-CM: 427.31   
 HTN (hypertension), benign     ICD-10-CM: I10 
ICD-9-CM: 401.1 Vitals BP Pulse Resp Height(growth percentile) Weight(growth percentile) SpO2  
 122/80 (BP 1 Location: Left arm, BP Patient Position: Sitting) 70 16 5' 9\" (1.753 m) 196 lb (88.9 kg) 96% BMI Smoking Status 28.94 kg/m2 Never Smoker Vitals History BMI and BSA Data Body Mass Index Body Surface Area  
 28.94 kg/m 2 2.08 m 2 Preferred Pharmacy Pharmacy Name Phone [id number] - [address] [facility], 1 [facility] [phone] Your Updated Medication List  
  
   
This list is accurate as of 18  4:15 PM.  Always use your most recent med list.  
  
  
  
  
 ALEVE 220 mg Cap Generic drug:  naproxen sodium Take 1 Tab by mouth as needed. amLODIPine 10 mg tablet Commonly known as:  Katrina Gabdahiana Take 10 mg by mouth daily. apixaban 5 mg tablet Commonly known as:  Gloria Conteh Take 1 Tab by mouth two (2) times a day. b complex vitamins tablet Take 1 Tab by mouth daily. lisinopril 20 mg tablet Commonly known as:  Robertha Roup Take 1 Tab by mouth daily. metoprolol succinate 100 mg tablet Commonly known as:  TOPROL-XL Take 1 Tab by mouth nightly. Follow-up Instructions Return in about 6 months (around 2/28/2019). Patient Instructions Tomorrow: Take 50 mg of Toprol in the morning and 50 mg in the evening. Then after that, shift Toprol dosing to the evening. Introducing [facility]! [employer] introduces BetterLesson patient portal. Now you can access parts of your medical record, email your doctor's office, and request medication refills online. 1. In your internet browser, go to [email or web address] 2. Click on the First Time User? Click Here link in the Sign In box. You will see the New Member Sign Up page. 3. Enter your BetterLesson Access Code exactly as it appears below. You will not need to use this code after youve completed the sign-up process. If you do not sign up before the expiration date, you must request a new code. · BetterLesson Access Code: [email or web address] Expires: 11/26/2018  4:15 PM 
 
4. Enter the last four digits of your Social Security Number (xxxx) and Date of Birth (mm/dd/yyyy) as indicated and click Submit. You will be taken to the next sign-up page. 5. Create a Search Million Culturet ID. This will be your BetterLesson login ID and cannot be changed, so think of one that is secure and easy to remember. 6. Create a BetterLesson password. You can change your password at any time. 7. Enter your Password Reset Question and Answer. This can be used at a later time if you forget your password. 8. Enter your e-mail address. You will receive e-mail notification when new information is available in [address] 9. Click Sign Up. You can now view and download portions of your medical record. 10. Click the Download Summary menu link to download a portable copy of your medical information. If you have questions, please visit the Frequently Asked Questions section of the [employer] website. Remember, [employer] is NOT to be used for urgent needs. For medical emergencies, dial 911. Now available from your iPhone and Android! Please provide this summary of care documentation to your next provider. Your primary care clinician is listed as Sandy Jose. If you have any questions after today's visit, please call [phone].

## 2018-08-28 NOTE — PROGRESS NOTES
Chief Complaint   Patient presents with    Follow-up     3 mths,PAF    Hypertension     1. Have you been to the ER, urgent care clinic since your last visit? Hospitalized since your last visit? No    2. Have you seen or consulted any other health care providers outside of the [facility] since your last visit? Include any pap smears or colon screening.  No    Visit Vitals    /80 (BP 1 Location: Left arm, BP Patient Position: Sitting)    Pulse 70    Resp 16    Ht 5' 9\" (1.753 m)    Wt 196 lb (88.9 kg)    SpO2 96%    BMI 28.94 kg/m2

## 2018-08-28 NOTE — PATIENT INSTRUCTIONS
Tomorrow: Take 50 mg of Toprol in the morning and 50 mg in the evening. Then after that, shift Toprol dosing to the evening.

## 2018-08-30 PROBLEM — R53.82 CHRONIC FATIGUE: Status: ACTIVE | Noted: 2018-08-30

## 2019-01-29 RX ORDER — APIXABAN 5 MG/1
TABLET, FILM COATED ORAL
Qty: 60 TAB | Refills: 3 | Status: SHIPPED | OUTPATIENT
Start: 2019-01-29

## 2019-02-06 ENCOUNTER — HOSPITAL ENCOUNTER (OUTPATIENT)
Dept: GENERAL RADIOLOGY | Age: 64
Discharge: HOME | End: 2019-02-06
Attending: ORTHOPAEDIC SURGERY
Payer: COMMERCIAL

## 2019-02-06 DIAGNOSIS — M16.0 ARTHRITIS OF BOTH HIPS: ICD-10-CM

## 2019-02-06 PROCEDURE — 74011636320 HC RX REV CODE- 636/320: Performed by: RADIOLOGY

## 2019-02-06 PROCEDURE — 74011250636 HC RX REV CODE- 250/636

## 2019-02-06 PROCEDURE — 74011000250 HC RX REV CODE- 250: Performed by: RADIOLOGY

## 2019-02-06 PROCEDURE — 20610 DRAIN/INJ JOINT/BURSA W/O US: CPT

## 2019-02-06 PROCEDURE — 74011250636 HC RX REV CODE- 250/636: Performed by: RADIOLOGY

## 2019-02-06 RX ORDER — LIDOCAINE HYDROCHLORIDE 10 MG/ML
INJECTION, SOLUTION EPIDURAL; INFILTRATION; INTRACAUDAL; PERINEURAL
Status: COMPLETED
Start: 2019-02-06 | End: 2019-02-06

## 2019-02-06 RX ORDER — TRIAMCINOLONE ACETONIDE 40 MG/ML
40 INJECTION, SUSPENSION INTRA-ARTICULAR; INTRAMUSCULAR
Status: COMPLETED | OUTPATIENT
Start: 2019-02-06 | End: 2019-02-06

## 2019-02-06 RX ORDER — BUPIVACAINE HYDROCHLORIDE 5 MG/ML
5 INJECTION, SOLUTION EPIDURAL; INTRACAUDAL
Status: COMPLETED | OUTPATIENT
Start: 2019-02-06 | End: 2019-02-06

## 2019-02-06 RX ADMIN — LIDOCAINE HYDROCHLORIDE 5 ML: 10 INJECTION, SOLUTION EPIDURAL; INFILTRATION; INTRACAUDAL; PERINEURAL at 14:00

## 2019-02-06 RX ADMIN — IOHEXOL 10 ML: 180 INJECTION INTRAVENOUS at 14:00

## 2019-02-06 RX ADMIN — BUPIVACAINE HYDROCHLORIDE 10 MG: 5 INJECTION, SOLUTION EPIDURAL; INTRACAUDAL at 14:00

## 2019-02-06 RX ADMIN — TRIAMCINOLONE ACETONIDE 40 MG: 40 INJECTION, SUSPENSION INTRA-ARTICULAR; INTRAMUSCULAR at 14:00

## 2019-03-21 ENCOUNTER — HOSPITAL ENCOUNTER (EMERGENCY)
Age: 64
Discharge: HOME | End: 2019-03-21
Attending: EMERGENCY MEDICINE
Payer: COMMERCIAL

## 2019-03-21 ENCOUNTER — APPOINTMENT (OUTPATIENT)
Dept: GENERAL RADIOLOGY | Age: 64
End: 2019-03-21
Attending: EMERGENCY MEDICINE
Payer: COMMERCIAL

## 2019-03-21 VITALS
TEMPERATURE: 98.2 F | HEART RATE: 67 BPM | OXYGEN SATURATION: 95 % | DIASTOLIC BLOOD PRESSURE: 91 MMHG | WEIGHT: 200 LBS | HEIGHT: 69 IN | RESPIRATION RATE: 10 BRPM | BODY MASS INDEX: 29.62 KG/M2 | SYSTOLIC BLOOD PRESSURE: 126 MMHG

## 2019-03-21 DIAGNOSIS — R00.2 PALPITATIONS: Primary | ICD-10-CM

## 2019-03-21 DIAGNOSIS — E87.6 HYPOKALEMIA: ICD-10-CM

## 2019-03-21 LAB
ALBUMIN SERPL-MCNC: 3.3 G/DL (ref 3.5–5)
ALBUMIN/GLOB SERPL: 1 {RATIO} (ref 1.1–2.2)
ALP SERPL-CCNC: 85 U/L (ref 45–117)
ALT SERPL-CCNC: 38 U/L (ref 12–78)
ANION GAP SERPL CALC-SCNC: 5 MMOL/L (ref 5–15)
AST SERPL-CCNC: 33 U/L (ref 15–37)
BASOPHILS # BLD: 0.1 K/UL (ref 0–0.1)
BASOPHILS NFR BLD: 1 % (ref 0–1)
BILIRUB SERPL-MCNC: 0.4 MG/DL (ref 0.2–1)
BUN SERPL-MCNC: 18 MG/DL (ref 6–20)
BUN/CREAT SERPL: 15 (ref 12–20)
CALCIUM SERPL-MCNC: 8 MG/DL (ref 8.5–10.1)
CHLORIDE SERPL-SCNC: 114 MMOL/L (ref 97–108)
CO2 SERPL-SCNC: 26 MMOL/L (ref 21–32)
CREAT SERPL-MCNC: 1.24 MG/DL (ref 0.7–1.3)
DIFFERENTIAL METHOD BLD: NORMAL
EOSINOPHIL # BLD: 0.3 K/UL (ref 0–0.4)
EOSINOPHIL NFR BLD: 4 % (ref 0–7)
ERYTHROCYTE [DISTWIDTH] IN BLOOD BY AUTOMATED COUNT: 13.5 % (ref 11.5–14.5)
GLOBULIN SER CALC-MCNC: 3.2 G/DL (ref 2–4)
GLUCOSE SERPL-MCNC: 116 MG/DL (ref 65–100)
HCT VFR BLD AUTO: 47.8 % (ref 36.6–50.3)
HGB BLD-MCNC: 16.3 G/DL (ref 12.1–17)
IMM GRANULOCYTES # BLD AUTO: 0 K/UL (ref 0–0.04)
IMM GRANULOCYTES NFR BLD AUTO: 0 % (ref 0–0.5)
LYMPHOCYTES # BLD: 2.1 K/UL (ref 0.8–3.5)
LYMPHOCYTES NFR BLD: 27 % (ref 12–49)
MCH RBC QN AUTO: 32.3 PG (ref 26–34)
MCHC RBC AUTO-ENTMCNC: 34.1 G/DL (ref 30–36.5)
MCV RBC AUTO: 94.7 FL (ref 80–99)
MONOCYTES # BLD: 1 K/UL (ref 0–1)
MONOCYTES NFR BLD: 13 % (ref 5–13)
NEUTS SEG # BLD: 4.3 K/UL (ref 1.8–8)
NEUTS SEG NFR BLD: 55 % (ref 32–75)
NRBC # BLD: 0 K/UL (ref 0–0.01)
NRBC BLD-RTO: 0 PER 100 WBC
PLATELET # BLD AUTO: 189 K/UL (ref 150–400)
PMV BLD AUTO: 10.2 FL (ref 8.9–12.9)
POTASSIUM SERPL-SCNC: 3.3 MMOL/L (ref 3.5–5.1)
PROT SERPL-MCNC: 6.5 G/DL (ref 6.4–8.2)
RBC # BLD AUTO: 5.05 M/UL (ref 4.1–5.7)
SODIUM SERPL-SCNC: 145 MMOL/L (ref 136–145)
TROPONIN I SERPL-MCNC: <0.05 NG/ML
WBC # BLD AUTO: 7.8 K/UL (ref 4.1–11.1)

## 2019-03-21 PROCEDURE — 93005 ELECTROCARDIOGRAM TRACING: CPT

## 2019-03-21 PROCEDURE — 71046 X-RAY EXAM CHEST 2 VIEWS: CPT

## 2019-03-21 PROCEDURE — 99284 EMERGENCY DEPT VISIT MOD MDM: CPT

## 2019-03-21 PROCEDURE — 36415 COLL VENOUS BLD VENIPUNCTURE: CPT

## 2019-03-21 PROCEDURE — 74011250637 HC RX REV CODE- 250/637: Performed by: EMERGENCY MEDICINE

## 2019-03-21 PROCEDURE — 85025 COMPLETE CBC W/AUTO DIFF WBC: CPT

## 2019-03-21 PROCEDURE — 80053 COMPREHEN METABOLIC PANEL: CPT

## 2019-03-21 PROCEDURE — 84484 ASSAY OF TROPONIN QUANT: CPT

## 2019-03-21 RX ORDER — POTASSIUM CHLORIDE 750 MG/1
40 TABLET, FILM COATED, EXTENDED RELEASE ORAL
Status: COMPLETED | OUTPATIENT
Start: 2019-03-21 | End: 2019-03-21

## 2019-03-21 RX ADMIN — POTASSIUM CHLORIDE 40 MEQ: 750 TABLET, EXTENDED RELEASE ORAL at 20:16

## 2019-03-21 NOTE — ED PROVIDER NOTES
61 y.o. male with past medical history significant for A-fib, PE, HTN, and chronic right hip pain who presents from private vehicle with chief complaint of chest pain. Pt reports chest pain since 1000 this morning. Pt also c/o neck pain and SOB. Pt notes his symptoms improved while lying on the stretcher for his EKG. Pt states he is taking metoprolol for A-fib. Pt notes he is also taking Eliquis. There are no other acute medical concerns at this time. PCP: Jonathan Lamar MD 
 
Note written by Go Segura, as dictated by Dariela Maynard MD 6:29 PM  
 
 
 
 
The history is provided by the patient. No  was used. Past Medical History:  
Diagnosis Date  Arrhythmia AFIB  Chronic pain RIGHT HIP  Hypertension  Nausea & vomiting  Pulmonary embolism ([address] Past Surgical History:  
Procedure Laterality Date  HX HEENT    
 SEPTOPLASTY  HX TONSILLECTOMY Family History:  
Problem Relation Age of Onset  Hypertension Father  Stroke Father  Coronary Artery Disease Sister  Heart Disease Sister  Hypertension Mother  Kidney Disease Mother  Lupus Sister  Anesth Problems Neg Hx Social History Socioeconomic History  Marital status:  Spouse name: Not on file  Number of children: Not on file  Years of education: Not on file  Highest education level: Not on file Occupational History  Not on file Social Needs  Financial resource strain: Not on file  Food insecurity:  
  Worry: Not on file Inability: Not on file  Transportation needs:  
  Medical: Not on file Non-medical: Not on file Tobacco Use  Smoking status: Never Smoker  Smokeless tobacco: Never Used Substance and Sexual Activity  Alcohol use: Yes Comment: 6 PACK A WEEK  Drug use: No  
 Sexual activity: Not on file Lifestyle  Physical activity:  
  Days per week: Not on file Minutes per session: Not on file  Stress: Not on file Relationships  Social connections:  
  Talks on phone: Not on file Gets together: Not on file Attends [employer] service: Not on file Active member of club or organization: Not on file Attends meetings of clubs or organizations: Not on file Relationship status: Not on file  Intimate partner violence:  
  Fear of current or ex partner: Not on file Emotionally abused: Not on file Physically abused: Not on file Forced sexual activity: Not on file Other Topics Concern  Not on file Social History Narrative  Not on file ALLERGIES: Patient has no known allergies. Review of Systems Respiratory: Positive for shortness of breath. Cardiovascular: Positive for chest pain. Musculoskeletal: Positive for neck pain. All other systems reviewed and are negative. There were no vitals filed for this visit. Physical Exam  
Constitutional: He is oriented to person, place, and time. He appears well-developed and well-nourished. No distress. NAD, AxOx4, speaking in complete sentences HENT:  
Head: Normocephalic and atraumatic. Mouth/Throat: Oropharynx is clear and moist. No oropharyngeal exudate. Cn intact Eyes: Pupils are equal, round, and reactive to light. Conjunctivae and EOM are normal. Right eye exhibits no discharge. Left eye exhibits no discharge. No scleral icterus. Neck: Normal range of motion. Neck supple. Cardiovascular: Normal rate, regular rhythm and intact distal pulses. Exam reveals no gallop and no friction rub. No murmur heard. Pulmonary/Chest: Effort normal and breath sounds normal. No stridor. No respiratory distress. He has no wheezes. He has no rales. He exhibits no tenderness. Abdominal: Soft. Bowel sounds are normal. He exhibits no distension and no mass. There is no tenderness. There is no rebound and no guarding. Genitourinary: Genitourinary Comments: Pt denies urinary/ Testicular/ scrotal or penile  complaints Musculoskeletal: Normal range of motion. He exhibits no edema, tenderness or deformity. Lymphadenopathy:  
  He has no cervical adenopathy. Neurological: He is alert and oriented to person, place, and time. He displays normal reflexes. No cranial nerve deficit or sensory deficit. He exhibits normal muscle tone. Coordination normal.  
pt has motor/ CV/ Sensation grossly intact to all extremities, R = L in strength;  
Skin: Skin is warm and dry. Capillary refill takes less than 2 seconds. No rash noted. He is not diaphoretic. No erythema. Psychiatric: He has a normal mood and affect. Nursing note and vitals reviewed. MDM Procedures Chief Complaint Patient presents with  Palpitations 7:25 PM 
The patients presenting problems have been discussed, and they are in agreement with the care plan formulated and outlined with them. I have encouraged them to ask questions as they arise throughout their visit. MEDICATIONS GIVEN: 
Medications - No data to display LABS REVIEWED: 
Labs Reviewed TROPONIN I  
METABOLIC PANEL, COMPREHENSIVE  
CBC WITH AUTOMATED DIFF  
 
 
RADIOLOGY RESULTS: 
The following have been ordered and reviewed: 
_____________________________________________________________________ 
_____________________________________________________________________ EKG interpretation:  
Rhythm: normal sinus rhythm; and regular . Rate (approx.): 70; Axis: normal; P wave: normal; QRS interval: normal ; ST/T wave: normal; Negative acute significant segmental elevations/ unchanged compared to study dated 05/29/2018 PROCEDURES: 
 
 
 
CONSULTATIONS:  
 
 
PROGRESS NOTES: 
 
DIAGNOSIS: 
 
1. Palpitations 2. Hypokalemia PLAN: 
1- repleate K/ d/c home with close cardiology follow-up; Pt/ Wife agrees w/ plans;  
 
 
ED COURSE: The patients hospital course has been uncomplicated. ED SUBSQUENT EKG interpretation: 
Rhythm: normal sinus rhythm; and regular . Rate (approx.): 60; Axis: normal; P wave: normal; QRS interval: normal ; ST/T wave: non-specific changes; in  Lead: ; Other findings: unchanged from previous ekg. This EKG was interpreted by Leif Santillan MD,ED Provider. 8:04 PM 
Newton Downey's  results have been reviewed with him. He has been counseled regarding his diagnosis. He verbally conveys understanding and agreement of the signs, symptoms, diagnosis, treatment and prognosis and additionally agrees to Call/ Arrange follow up as recommended with Dr. John Oconnor MD in 24 - 48 hours. He also agrees with the care-plan and conveys that all of his questions have been answered. I have also put together some discharge instructions for him that include: 1) educational information regarding their diagnosis, 2) how to care for their diagnosis at home, as well a 3) list of reasons why they would want to return to the ED prior to their follow-up appointment, should their condition change or for concerns.

## 2019-03-21 NOTE — ED TRIAGE NOTES
Pt presents feeling like he went into afib at approximately 1000. Reports hx of same for which he takes metoprolol and eliquis. Pt reports feeling short of breath and fatigued. Reports his symptoms improved when he laid on the stretcher for his EKG.

## 2019-03-21 NOTE — LETTER
[address] 
[facility] EMERGENCY DEPT 
[address] [place] [address] [phone] Work/School Note Date: 3/21/2019 To Whom It May concern: 
 
Brenna Bah was seen and treated today in the emergency room by the following provider(s): 
No providers found. Brenna Bah may return to work on 3/22/19. Sincerely, Erica Gee RN

## 2019-03-22 LAB
ATRIAL RATE: 61 BPM
ATRIAL RATE: 70 BPM
CALCULATED P AXIS, ECG09: 49 DEGREES
CALCULATED P AXIS, ECG09: 60 DEGREES
CALCULATED R AXIS, ECG10: -27 DEGREES
CALCULATED R AXIS, ECG10: -39 DEGREES
CALCULATED T AXIS, ECG11: 38 DEGREES
CALCULATED T AXIS, ECG11: 38 DEGREES
DIAGNOSIS, 93000: NORMAL
DIAGNOSIS, 93000: NORMAL
P-R INTERVAL, ECG05: 168 MS
P-R INTERVAL, ECG05: 172 MS
Q-T INTERVAL, ECG07: 404 MS
Q-T INTERVAL, ECG07: 414 MS
QRS DURATION, ECG06: 86 MS
QRS DURATION, ECG06: 92 MS
QTC CALCULATION (BEZET), ECG08: 416 MS
QTC CALCULATION (BEZET), ECG08: 436 MS
VENTRICULAR RATE, ECG03: 61 BPM
VENTRICULAR RATE, ECG03: 70 BPM

## 2019-03-22 NOTE — DISCHARGE INSTRUCTIONS
Patient Education        Palpitations: Care Instructions  Your Care Instructions    Heart palpitations are the uncomfortable sensation that your heart is beating fast or irregularly. You might feel pounding or fluttering in your chest. It might feel like your heart is skipping a beat. Although palpitations may be caused by a heart problem, they also occur because of stress, fatigue, or use of alcohol, caffeine, or nicotine. Many medicines, including diet pills, antihistamines, decongestants, and some herbal products, can cause heart palpitations. Nearly everyone has palpitations from time to time. Depending on your symptoms, your doctor may need to do more tests to try to find the cause of your palpitations. Follow-up care is a key part of your treatment and safety. Be sure to make and go to all appointments, and call your doctor if you are having problems. It's also a good idea to know your test results and keep a list of the medicines you take. How can you care for yourself at home? · Avoid caffeine, nicotine, and excess alcohol. · Do not take illegal drugs, such as methamphetamines and cocaine. · Do not take weight loss or diet medicines unless you talk with your doctor first.  · Get plenty of sleep. · Do not overeat. · If you have palpitations again, take deep breaths and try to relax. This may slow a racing heart. · If you start to feel lightheaded, lie down to avoid injuries that might result if you pass out and fall down. · Keep a record of your palpitations and bring it to your next doctor's appointment. Write down:  ? The date and time. ? Your pulse. (If your heart is beating fast, it may be hard to count your pulse.)  ? What you were doing when the palpitations started. ? How long the palpitations lasted. ? Any other symptoms. · If an activity causes palpitations, slow down or stop. Talk to your doctor before you do that activity again. · Take your medicines exactly as prescribed.  Call your doctor if you think you are having a problem with your medicine. When should you call for help? Call 911 anytime you think you may need emergency care. For example, call if:    · You passed out (lost consciousness).     · You have symptoms of a heart attack. These may include:  ? Chest pain or pressure, or a strange feeling in the chest.  ? Sweating. ? Shortness of breath. ? Pain, pressure, or a strange feeling in the back, neck, jaw, or upper belly or in one or both shoulders or arms. ? Lightheadedness or sudden weakness. ? A fast or irregular heartbeat. After you call 911, the  may tell you to chew 1 adult-strength or 2 to 4 low-dose aspirin. Wait for an ambulance. Do not try to drive yourself.     · You have symptoms of a stroke. These may include:  ? Sudden numbness, tingling, weakness, or loss of movement in your face, arm, or leg, especially on only one side of your body. ? Sudden vision changes. ? Sudden trouble speaking. ? Sudden confusion or trouble understanding simple statements. ? Sudden problems with walking or balance. ? A sudden, severe headache that is different from past headaches.    Call your doctor now or seek immediate medical care if:    · You have heart palpitations and:  ? Are dizzy or lightheaded, or you feel like you may faint. ? Have new or increased shortness of breath.    Watch closely for changes in your health, and be sure to contact your doctor if:    · You continue to have heart palpitations. Where can you learn more? Go to http://lizbeth-yang.info/. Enter R508 in the search box to learn more about \"Palpitations: Care Instructions. \"  Current as of: July 22, 2018  Content Version: 11.9  © 2328-3960 [employer]. Care instructions adapted under license by [employer] (which disclaims liability or warranty for this information).  If you have questions about a medical condition or this instruction, always ask your healthcare professional. [place] [address] any warranty or liability for your use of this information. Patient Education        Hypokalemia: Care Instructions  Your Care Instructions    Hypokalemia (say \"hw-rt-asm-SHELBY-cely-uh\") is a low level of potassium. The heart, muscles, kidneys, and nervous system all need potassium to work well. This problem has many different causes. Kidney problems, diet, and medicines like diuretics and laxatives can cause it. So can vomiting or diarrhea. In some cases, cancer is the cause. Your doctor may do tests to find the cause of your low potassium levels. You may need medicines to bring your potassium levels back to normal. You may also need regular blood tests to check your potassium. If you have very low potassium, you may need intravenous (IV) medicines. You also may need tests to check the electrical activity of your heart. Heart problems caused by low potassium levels can be very serious. Follow-up care is a key part of your treatment and safety. Be sure to make and go to all appointments, and call your doctor if you are having problems. It's also a good idea to know your test results and keep a list of the medicines you take. How can you care for yourself at home? · If your doctor recommends it, eat foods that have a lot of potassium. These include fresh fruits, juices, and vegetables. They also include nuts, beans, and milk. · Be safe with medicines. If your doctor prescribes medicines or potassium supplements, take them exactly as directed. Call your doctor if you have any problems with your medicines. · Get your potassium levels tested as often as your doctor tells you. When should you call for help? Call 911 anytime you think you may need emergency care. For example, call if:    · You feel like your heart is missing beats.  Heart problems caused by low potassium can cause death.     · You passed out (lost consciousness).     · You have a seizure.    Call your doctor now or seek immediate medical care if:    · You feel weak or unusually tired.     · You have severe arm or leg cramps.     · You have tingling or numbness.     · You feel sick to your stomach, or you vomit.     · You have belly cramps.     · You feel bloated or constipated.     · You have to urinate a lot.     · You feel very thirsty most of the time.     · You are dizzy or lightheaded, or you feel like you may faint.     · You feel depressed, or you lose touch with reality.    Watch closely for changes in your health, and be sure to contact your doctor if:    · You do not get better as expected. Where can you learn more? Go to http://lizbeth-yang.info/. Enter G358 in the search box to learn more about \"Hypokalemia: Care Instructions. \"  Current as of: March 14, 2018  Content Version: 11.9  © 6107-7581 [employer]. Care instructions adapted under license by [employer] (which disclaims liability or warranty for this information). If you have questions about a medical condition or this instruction, always ask your healthcare professional. [address] any warranty or liability for your use of this information.

## 2019-05-22 ENCOUNTER — HOSPITAL ENCOUNTER (OUTPATIENT)
Dept: NUCLEAR MEDICINE | Age: 64
Discharge: HOME | End: 2019-05-22
Attending: ORTHOPAEDIC SURGERY
Payer: COMMERCIAL

## 2019-05-22 DIAGNOSIS — Z96.641 HISTORY OF TOTAL RIGHT HIP ARTHROPLASTY: ICD-10-CM

## 2019-05-22 PROCEDURE — 78300 BONE IMAGING LIMITED AREA: CPT

## 2019-06-19 ENCOUNTER — OFFICE VISIT (OUTPATIENT)
Dept: CARDIOLOGY CLINIC | Age: 64
End: 2019-06-19

## 2019-06-19 VITALS
OXYGEN SATURATION: 97 % | HEIGHT: 69 IN | BODY MASS INDEX: 30.81 KG/M2 | WEIGHT: 208 LBS | DIASTOLIC BLOOD PRESSURE: 80 MMHG | SYSTOLIC BLOOD PRESSURE: 118 MMHG | HEART RATE: 61 BPM

## 2019-06-19 DIAGNOSIS — I48.0 PAROXYSMAL ATRIAL FIBRILLATION (HCC): Primary | ICD-10-CM

## 2019-06-19 DIAGNOSIS — M16.12 PRIMARY OSTEOARTHRITIS OF LEFT HIP: ICD-10-CM

## 2019-06-19 DIAGNOSIS — R53.82 CHRONIC FATIGUE: ICD-10-CM

## 2019-06-19 DIAGNOSIS — I10 HTN (HYPERTENSION), BENIGN: ICD-10-CM

## 2019-06-19 RX ORDER — TAMSULOSIN HYDROCHLORIDE 0.4 MG/1
0.4 CAPSULE ORAL DAILY
COMMUNITY

## 2019-06-19 NOTE — PROGRESS NOTES
Brionna Hargrove MD [facility]  [address], [facility], [address] [place] [address]    Office [phone]  Fax [phone]  Cell [phone]        Eriberto Thomas is a 61 y.o. male Last seen by me 10 months ago. Assessment  Encounter Diagnoses   Name Primary?  Paroxysmal atrial fibrillation (HCC) Yes    HTN (hypertension), benign     Chronic fatigue     Primary osteoarthritis of left hip        Recommendations:    1. PAF. Only 1 major episode of AF by Hx. We discussed the role of AAD or ablation should he have more frequent sxs. Continue Toprol and Eliquis for now. 2. HTN. Normotensive on combination therapy. 3. Fatigue, likely multifactorial. Advised him to shift dosing of Toprol to bedtime. I am not a big fan of night shifts in cardiac patients. I would rather him work a daytime job. 4. Left hip arthritis, low cardiac risk for THR. Safe to stop Eliquis 3 days pre-op, resume post-op. Patient Active Problem List    Diagnosis    Chronic fatigue    Osteoarthritis of one hip, right    Paroxysmal atrial fibrillation (HCC)    HTN (hypertension), benign         Subjective: Eriberto Thomas reports he has an upcoming left hip replacement in July with Dr. Eddie Noble. He reports a bad AF spell in 3/2019. His blood work showed low potassium. He started a potassium supplement and he has had no recurrence. He also added bananas and orange juice to his diet. 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 He mentions feeling exhausted all time. He reports gaining weight despite a good diet. He says he does not exercise as much as he should. Patient denies any exertional chest pain, dyspnea, syncope, orthopnea, edema or paroxysmal nocturnal dyspnea. Cardiac risk factors   HTN no  DM no  Smoking no  Family hx of CAD yes    Cardiac testing  ETT 8/30/17 - 10:00. PMHR 169 (105%). Rest -104. Stress /120. No angina with exercise. Non-ischemic EKG with stress. Echo 8/30/17 - Ef 60-65%. No WMA. Mild LVH. Mild LAE. Small atrial septal aneurysm. Mild MR.  24 hour Holter monitor 9/11/17 - SR . Frequent PAC's. Non-sustained atrial tach vs. Afib with RVR. Past Medical History:   Diagnosis Date    Arrhythmia     AFIB    Atrial fibrillation (HCC)     Chronic pain     RIGHT HIP    Hypertension     Nausea & vomiting     Pulmonary embolism (HCC)         Current Outpatient Medications   Medication Sig Dispense Refill    tamsulosin (FLOMAX) 0.4 mg capsule Take 0.4 mg by mouth daily.  ELIQUIS 5 mg tablet TAKE 1 TABLET BY MOUTH TWO TIMES DAILY 60 Tab 3    amLODIPine (NORVASC) 10 mg tablet Take 10 mg by mouth daily. 3    metoprolol succinate (TOPROL-XL) 100 mg tablet Take 1 Tab by mouth nightly. 30 Tab 5    lisinopril (PRINIVIL, ZESTRIL) 20 mg tablet Take 1 Tab by mouth daily. 30 Tab 5    naproxen sodium (ALEVE) 220 mg cap Take 1 Tab by mouth as needed.  b complex vitamins tablet Take 1 Tab by mouth daily. 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 Review of Systems  Constitutional: Negative for fever, chills and diaphoresis. +fatigue  Respiratory: Negative for cough, hemoptysis, sputum production, shortness of breath and wheezing. Cardiovascular: Negative for palpitations, orthopnea, claudication and PND. Gastrointestinal: Negative for heartburn, nausea, vomiting, blood in stool and melena. Genitourinary: Negative for dysuria and flank pain. Musculoskeletal: Negative for joint pain and back pain. Skin: Negative for rash. Neurological: Negative for focal weakness, seizures, loss of consciousness, weakness and headaches. Endo/Heme/Allergies: Does not bruise/bleed easily. Psychiatric/Behavioral: Negative for memory loss. The patient does not have insomnia.         Physical Exam    Visit Vitals  /80 (BP 1 Location: Left arm, BP Patient Position: Sitting) Pulse 61   Ht 5' 9\" (1.753 m)   Wt 208 lb (94.3 kg)   SpO2 97%   BMI 30.72 kg/m²     Wt Readings from Last 3 Encounters:   06/19/19 208 lb (94.3 kg)   03/21/19 200 lb (90.7 kg)   08/28/18 196 lb (88.9 kg)      General - well developed well nourished  Neck - JVP normal, thyroid nl  Cardiac - normal S1, S2, no murmurs, rubs or gallops. No clicks  Vascular - carotids without bruits, radials, femorals and pedal pulses equal bilateral  Lungs - clear to auscultation bilaterals, no rales, wheezing or rhonchi  Abd - soft nontender, no HSM, no abd bruits  Extremities - no edema  Skin - no rash  Neuro - nonfocal  Psych - normal mood and affect      Cardiographics  EKG 8/14/17- SR, lateral T wave inversions, no previous EKG. EKG 5/29/18- SR 59, nonspecific lateral T wave inversions. No significant change from 8/14/17. Written by Seth Geronimo, as dictated by ADRIANNA Chowdhury MD

## 2019-06-19 NOTE — PROGRESS NOTES
Patient says he has palpitations sometimes.    patient says from his knees down he has swelling      Visit Vitals  /80 (BP 1 Location: Left arm, BP Patient Position: Sitting)   Pulse 61   Ht 5' 9\" (1.753 m)   Wt 208 lb (94.3 kg)   SpO2 97%   BMI 30.72 kg/m²

## 2019-07-15 ENCOUNTER — TELEPHONE (OUTPATIENT)
Dept: CARDIOLOGY CLINIC | Age: 64
End: 2019-07-15

## 2019-07-15 NOTE — TELEPHONE ENCOUNTER
Patient states that he discussed having a cardiac clearance sent over to [place] [address] Fax: [phone]  at his appointment on  6/19. He received a call today from [place] [address] stating that they have not received the fax as of yet. His surgery is scheduled for 7/24.     Phone: [phone]

## 2019-07-16 ENCOUNTER — HOSPITAL ENCOUNTER (OUTPATIENT)
Dept: PREADMISSION TESTING | Age: 64
Discharge: HOME | End: 2019-07-16
Payer: COMMERCIAL

## 2019-07-16 VITALS
HEART RATE: 55 BPM | HEIGHT: 69 IN | WEIGHT: 207 LBS | BODY MASS INDEX: 30.66 KG/M2 | TEMPERATURE: 97.8 F | DIASTOLIC BLOOD PRESSURE: 83 MMHG | SYSTOLIC BLOOD PRESSURE: 139 MMHG

## 2019-07-16 DIAGNOSIS — R73.09 ELEVATED HEMOGLOBIN A1C: ICD-10-CM

## 2019-07-16 LAB
ABO + RH BLD: NORMAL
ANION GAP SERPL CALC-SCNC: 7 MMOL/L (ref 5–15)
APPEARANCE UR: ABNORMAL
BACTERIA URNS QL MICRO: NEGATIVE /HPF
BILIRUB UR QL: NEGATIVE
BLOOD GROUP ANTIBODIES SERPL: NORMAL
BUN SERPL-MCNC: 20 MG/DL (ref 6–20)
BUN/CREAT SERPL: 15 (ref 12–20)
CALCIUM SERPL-MCNC: 8.6 MG/DL (ref 8.5–10.1)
CHLORIDE SERPL-SCNC: 111 MMOL/L (ref 97–108)
CO2 SERPL-SCNC: 26 MMOL/L (ref 21–32)
COLOR UR: ABNORMAL
CREAT SERPL-MCNC: 1.33 MG/DL (ref 0.7–1.3)
EPITH CASTS URNS QL MICRO: ABNORMAL /LPF
ERYTHROCYTE [DISTWIDTH] IN BLOOD BY AUTOMATED COUNT: 13.4 % (ref 11.5–14.5)
EST. AVERAGE GLUCOSE BLD GHB EST-MCNC: 126 MG/DL
GLUCOSE SERPL-MCNC: 87 MG/DL (ref 65–100)
GLUCOSE UR STRIP.AUTO-MCNC: NEGATIVE MG/DL
HBA1C MFR BLD: 6 % (ref 4.2–6.3)
HCT VFR BLD AUTO: 45 % (ref 36.6–50.3)
HGB BLD-MCNC: 15.1 G/DL (ref 12.1–17)
HGB UR QL STRIP: NEGATIVE
HYALINE CASTS URNS QL MICRO: ABNORMAL /LPF (ref 0–5)
INR PPP: 1.1 (ref 0.9–1.1)
KETONES UR QL STRIP.AUTO: NEGATIVE MG/DL
LEUKOCYTE ESTERASE UR QL STRIP.AUTO: NEGATIVE
MCH RBC QN AUTO: 32.5 PG (ref 26–34)
MCHC RBC AUTO-ENTMCNC: 33.6 G/DL (ref 30–36.5)
MCV RBC AUTO: 96.8 FL (ref 80–99)
NITRITE UR QL STRIP.AUTO: NEGATIVE
NRBC # BLD: 0 K/UL (ref 0–0.01)
NRBC BLD-RTO: 0 PER 100 WBC
PH UR STRIP: 7 [PH] (ref 5–8)
PLATELET # BLD AUTO: 161 K/UL (ref 150–400)
PMV BLD AUTO: 11.2 FL (ref 8.9–12.9)
POTASSIUM SERPL-SCNC: 3.1 MMOL/L (ref 3.5–5.1)
PROT UR STRIP-MCNC: NEGATIVE MG/DL
PROTHROMBIN TIME: 10.7 SEC (ref 9–11.1)
RBC # BLD AUTO: 4.65 M/UL (ref 4.1–5.7)
RBC #/AREA URNS HPF: ABNORMAL /HPF (ref 0–5)
SODIUM SERPL-SCNC: 144 MMOL/L (ref 136–145)
SP GR UR REFRACTOMETRY: 1.02 (ref 1–1.03)
SPECIMEN EXP DATE BLD: NORMAL
UA: UC IF INDICATED,UAUC: ABNORMAL
UROBILINOGEN UR QL STRIP.AUTO: 0.2 EU/DL (ref 0.2–1)
WBC # BLD AUTO: 8.4 K/UL (ref 4.1–11.1)
WBC URNS QL MICRO: ABNORMAL /HPF (ref 0–4)

## 2019-07-16 PROCEDURE — 80048 BASIC METABOLIC PNL TOTAL CA: CPT

## 2019-07-16 PROCEDURE — 85610 PROTHROMBIN TIME: CPT

## 2019-07-16 PROCEDURE — 85027 COMPLETE CBC AUTOMATED: CPT

## 2019-07-16 PROCEDURE — 81001 URINALYSIS AUTO W/SCOPE: CPT

## 2019-07-16 PROCEDURE — 86900 BLOOD TYPING SEROLOGIC ABO: CPT

## 2019-07-16 PROCEDURE — 36415 COLL VENOUS BLD VENIPUNCTURE: CPT

## 2019-07-16 PROCEDURE — 83036 HEMOGLOBIN GLYCOSYLATED A1C: CPT

## 2019-07-16 NOTE — PERIOP NOTES
PREOPERATIVE INSTRUCTIONS REVIEWED WITH PATIENT. PATIENT GIVEN TWO--BOTTLES OF CHG SOAPS  INSTRUCTIONS REVIEWED ON USE OF CHG SOAPS  PATIENT GIVEN SSI INFECTIONS SHEET; MRSA/MSSA TREATMENT INSTRUCTION SHEET GIVEN WITH AN EXPLANATION TO PATIENT THAT THEY WILL BE NOTIFIED IF TREATMENT INSTRUCTIONS NEED TO BE INITIATED. PATIENT WAS GIVEN THE OPPORTUNITY TO ASK QUESTIONS; REGARDING THE INFORMATION PROVIDED.       PREOP DIET AND NUTRITION UPDATED GUIDELINES/ INSTRUCTIONS PROVIDED

## 2019-07-17 PROBLEM — R73.09 ELEVATED HEMOGLOBIN A1C: Status: ACTIVE | Noted: 2019-07-17

## 2019-07-17 PROBLEM — E87.6 HYPOKALEMIA: Status: ACTIVE | Noted: 2019-07-17

## 2019-07-17 LAB
BACTERIA SPEC CULT: NORMAL
BACTERIA SPEC CULT: NORMAL
SERVICE CMNT-IMP: NORMAL

## 2019-07-17 NOTE — ADVANCED PRACTICE NURSE
Informed Vani Gray, nurse for Dr Jackson Guillen, PCP, of K 3.1. Vani Gray states she will follow up with patient, treat per Dr Jackson Guillen, repeat labs and fax to PAT when complete. PAT labs previously forwarded to Dr Kulwant Hackett office. 7/19- Patient has f/u appt 7/22 to redraw labs. Has been prescribed K. Vani Gray will fax f/u note to PAT 7/23.    7/23- Received repeat BMP 7/22 from PCP. K 3.8.

## 2019-07-17 NOTE — PERIOP NOTES
CALLED DR LEBLANC`S OFFICE AND LEFT A VOICEMAIL FOR  VERO  ABOUT ABNORMAL LABS,  K 3.1, CL 111ALL LABS, EKG  HAVE BEEN FAXED OVER TO OFFICE.

## 2019-07-22 NOTE — H&P
Carmella Yahaira  Location: - [facility]  Patient #: [id number]  : 1955  Unknown / Language: [place] / Race: White  Male      History of Present Illness Rama Keita MD; 2019 8:58 AM)  The patient is a 61year old male who presents with a complaint of Hip Pain. The onset of the hip pain has been sudden (fell on hip). The course has been gradually worsening. The hip pain is described as a moderate to severe dull aching. The hip pain is described as being located in the hip (right). The pain is aggravated by general physical activity, walking and climbing stairs. Relieving factors include rest. Previous surgery has included total hip arthroplasty (right total hip 10/30/17). Note for \"Hip Pain\": Patient's chief complaint is right hip pain.  Long discussion regarding his symptoms.  Sounds like he has some start up pain.  Had no issues with his hip until January when he sustained a fall and had significant bruising in his right lower extremity.  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  The majority of his pain is in his thigh but there is some in his groin. Problem List/[facility] 2019 8:33 AM)  History of total right hip arthroplasty (V43.64  Z96.641)    Primary osteoarthritis of right hip (715.15  M16.11)    Chronic pain of left knee (719.46  M25.562)    Status post shoulder surgery (V45.89  Z98.890)    Primary osteoarthritis of left knee (715.16  M17.12)    Acute medial meniscus tear, left, subsequent encounter (C78.05  B28.147L)    REVIEW OF SYSTEMS: Systems were reviewed by the provider.    Patient was referred to their primary care physician for Blood Pressure screening.    Tendinitis of left rotator cuff (726.10  M75.82)    Shoulder impingement, left (726.2  M75.42)    Weight above 97th percentile (V49.89  Z78.9)    Arthritis of both hips (716.95  M16.0)    Pain of left shoulder region (719.41  M25.512)    Osteoarthritis of glenohumeral joint, left (715.91  M19.012)    Lipoma of left shoulder (214.8  D17.22)      Allergies Joel Garcia [place]; 5/8/2019 8:33 AM)  No Known Drug Allergies  [08/25/2016]:  No Known Allergies  [08/25/2016]: Allergies Reconciled      Family History Joel CRAWLEYJohanna Radha [place]; 5/8/2019 8:33 AM)  Cerebrovascular Accident   Father. Hypertension   Father, Mother. Social History Joel Alonzo Golden; 5/8/2019 8:33 AM)  Current work status   Full-time. Caffeine use   Coffee, Occasionally. Alcohol use   1-2 drinks per occasion, 5 times, Drinks beer, Never drinks more than 5 drinks per occasion, per week. Marital status   . No drug use    Seat Belt Use   Always uses seat belts. Sun Exposure   Frequently. Exercise   3-4 times per week, cycling, running. Tobacco / smoke exposure   None. Tobacco use   Never smoker. Medication History Joel Correia, [facility]; 5/8/2019 8:34 AM)  Tamsulosin HCl  (0.4MG Capsule, Oral) Active. ProAir HFA  (108 (90 Base)MCG/ACT Aerosol Soln, Inhalation) Active. Percocet  (5-325MG Tablet, 1 (one) to 2 (two) Tablet Oral every four hours to six hours, as needed for pain, Taken starting 07/30/2018) Active. AmLODIPine Besylate  (10MG Tablet, Oral) Active. Eliquis  (5MG Tablet, Oral) Active. Lisinopril  (20MG Tablet, Oral) Active. Metoprolol Succinate ER  (100MG Tablet ER 24HR, Oral) Active. Medications Reconciled     Past Surgical History Joel Golden; 5/8/2019 8:33 AM)  Vasectomy      Other Problems Joel Correia, [facility]; 5/8/2019 8:33 AM)  No pertinent past medical history    Unspecified Diagnosis    Unspecified Diagnosis    Treatment options were discussed with the patient in full.    Unspecified Diagnosis          Review of Systems Joel Correia [facility]; 5/8/2019 8:33 AM)  General Not Present- Chills and Fatigue.   Skin Not Present- Bruising, Pallor and Skin Color Changes. Respiratory Not Present- Cough and Difficulty Breathing. Cardiovascular Not Present- Chest Pain, Fainting / Blacking Out and Rapid Heart Rate. Musculoskeletal Present- Joint Pain. Not Present- Decreased Range of Motion and Joint Swelling. Neurological Not Present- Dysesthesia, Paresthesias and Weakness In Extremities. Hematology Not Present- Abnormal Bleeding, Blood Clots and Petechiae. Vitals Dg CRAWLEYJohanna Correia [facility]; 5/8/2019 8:32 AM)  5/8/2019 8:32 AM  Weight: 200 lb   Height: 69 in   Weight was reported by patient. Height was reported by patient. Body Surface Area: 2.07 m²   Body Mass Index: 29.53 kg/m²                Physical Exam Sandy Parks MD; 5/8/2019 8:58 AM)  Musculoskeletal  Global Assessment  Examination of related systems reveals - well-developed, well-nourished, in no acute distress, alert and oriented x 3, no rashes or ulcers of bilateral upper and lower extremities, head or trunk, no generalized swelling or edema of extremities, no digital clubbing or cyanosis, neurovascularly intact globally with normal deep tendon reflexes and normal coordination. Gait and Station - normal gait and station and normal posture. Right Lower Extremity - Note: Patient's gait is normal. He does not limp. When examined there are no physical findings to inspection. He has some mild hip flexor weakness. Assessment & Plan Sandy Parks MD; 5/8/2019 9:02 AM)  History of total right hip arthroplasty (V43.64  Z96.641)  Impression: I do not see much in the way of issue with his implant. There has been no change since his last x-ray. At this point I am going to obtain bone scan, sed rate, C-reactive protein. We will call with the results. Current Plans  Pt Education - How to access health information online: discussed with patient and provided information. Pt Education - Educational materials were provided.: discussed with patient and provided information.   X-RAY EXAM OF HIP COMPLETE min 2 VIEWS (36006) (AP Pelvis and right Lateral hip views were taken today using Digital Radiography. 3 x-ray views of the patient's right total hip. There is no evidence of implant loosening. Good Calcar atrophy. No acute issues are noted.)  SED RATE ERYTHROCYTE (46469)  C-REACTIVE PROTEIN (06642)  CT SCAN OF PELVIS (21136) (CT Scan of the right hip to rule out prothetic loosening.  Please call patient to schedule appointment.)  REVIEW OF SYSTEMS: Systems were reviewed by the provider.(V49.9)  History of total right hip arthroplasty (G53.81  U97.342)        Signed by Monet Garcia MD

## 2019-07-23 ENCOUNTER — ANESTHESIA EVENT (OUTPATIENT)
Dept: SURGERY | Age: 64
DRG: 470 | End: 2019-07-23
Payer: COMMERCIAL

## 2019-07-24 ENCOUNTER — APPOINTMENT (OUTPATIENT)
Dept: GENERAL RADIOLOGY | Age: 64
DRG: 470 | End: 2019-07-24
Attending: ORTHOPAEDIC SURGERY
Payer: COMMERCIAL

## 2019-07-24 ENCOUNTER — HOSPITAL ENCOUNTER (INPATIENT)
Age: 64
LOS: 1 days | Discharge: HOME HEALTH | DRG: 470 | End: 2019-07-25
Attending: ORTHOPAEDIC SURGERY | Admitting: ORTHOPAEDIC SURGERY
Payer: COMMERCIAL

## 2019-07-24 ENCOUNTER — APPOINTMENT (OUTPATIENT)
Dept: GENERAL RADIOLOGY | Age: 64
DRG: 470 | End: 2019-07-24
Attending: PHYSICIAN ASSISTANT
Payer: COMMERCIAL

## 2019-07-24 ENCOUNTER — ANESTHESIA (OUTPATIENT)
Dept: SURGERY | Age: 64
DRG: 470 | End: 2019-07-24
Payer: COMMERCIAL

## 2019-07-24 DIAGNOSIS — M16.7 OTHER SECONDARY OSTEOARTHRITIS OF LEFT HIP: Primary | ICD-10-CM

## 2019-07-24 PROBLEM — M16.12 OSTEOARTHRITIS OF LEFT HIP: Status: ACTIVE | Noted: 2019-07-24

## 2019-07-24 LAB
GLUCOSE BLD STRIP.AUTO-MCNC: 90 MG/DL (ref 65–100)
SERVICE CMNT-IMP: NORMAL

## 2019-07-24 PROCEDURE — C1776 JOINT DEVICE (IMPLANTABLE): HCPCS | Performed by: ORTHOPAEDIC SURGERY

## 2019-07-24 PROCEDURE — 77030020782 HC GWN BAIR PAWS FLX 3M -B

## 2019-07-24 PROCEDURE — 0SRB06A REPLACEMENT OF LEFT HIP JOINT WITH OXIDIZED ZIRCONIUM ON POLYETHYLENE SYNTHETIC SUBSTITUTE, UNCEMENTED, OPEN APPROACH: ICD-10-PCS | Performed by: ORTHOPAEDIC SURGERY

## 2019-07-24 PROCEDURE — 76210000000 HC OR PH I REC 2 TO 2.5 HR: Performed by: ORTHOPAEDIC SURGERY

## 2019-07-24 PROCEDURE — 74011250636 HC RX REV CODE- 250/636

## 2019-07-24 PROCEDURE — 65270000029 HC RM PRIVATE

## 2019-07-24 PROCEDURE — P9045 ALBUMIN (HUMAN), 5%, 250 ML: HCPCS

## 2019-07-24 PROCEDURE — 74011000250 HC RX REV CODE- 250

## 2019-07-24 PROCEDURE — 74011250637 HC RX REV CODE- 250/637: Performed by: PHYSICIAN ASSISTANT

## 2019-07-24 PROCEDURE — 74011000258 HC RX REV CODE- 258: Performed by: ORTHOPAEDIC SURGERY

## 2019-07-24 PROCEDURE — 77030020365 HC SOL INJ SOD CL 0.9% 50ML: Performed by: ORTHOPAEDIC SURGERY

## 2019-07-24 PROCEDURE — 77030018846 HC SOL IRR STRL H20 ICUM -A: Performed by: ORTHOPAEDIC SURGERY

## 2019-07-24 PROCEDURE — 77030002933 HC SUT MCRYL J&J -A: Performed by: ORTHOPAEDIC SURGERY

## 2019-07-24 PROCEDURE — 74011250637 HC RX REV CODE- 250/637

## 2019-07-24 PROCEDURE — 74011250636 HC RX REV CODE- 250/636: Performed by: PHYSICIAN ASSISTANT

## 2019-07-24 PROCEDURE — 77030031139 HC SUT VCRL2 J&J -A: Performed by: ORTHOPAEDIC SURGERY

## 2019-07-24 PROCEDURE — C9290 INJ, BUPIVACAINE LIPOSOME: HCPCS | Performed by: ORTHOPAEDIC SURGERY

## 2019-07-24 PROCEDURE — 76010000173 HC OR TIME 3 TO 3.5 HR INTENSV-TIER 1: Performed by: ORTHOPAEDIC SURGERY

## 2019-07-24 PROCEDURE — 76060000037 HC ANESTHESIA 3 TO 3.5 HR: Performed by: ORTHOPAEDIC SURGERY

## 2019-07-24 PROCEDURE — 77030008684 HC TU ET CUF COVD -B: Performed by: NURSE ANESTHETIST, CERTIFIED REGISTERED

## 2019-07-24 PROCEDURE — 77030018836 HC SOL IRR NACL ICUM -A: Performed by: ORTHOPAEDIC SURGERY

## 2019-07-24 PROCEDURE — 74011250636 HC RX REV CODE- 250/636: Performed by: ORTHOPAEDIC SURGERY

## 2019-07-24 PROCEDURE — 77030011640 HC PAD GRND REM COVD -A: Performed by: ORTHOPAEDIC SURGERY

## 2019-07-24 PROCEDURE — 77030039267 HC ADH SKN EXOFIN S2SG -B: Performed by: ORTHOPAEDIC SURGERY

## 2019-07-24 PROCEDURE — 74011250636 HC RX REV CODE- 250/636: Performed by: ANESTHESIOLOGY

## 2019-07-24 PROCEDURE — 77030006822 HC BLD SAW SAG BRSM -B: Performed by: ORTHOPAEDIC SURGERY

## 2019-07-24 PROCEDURE — 82962 GLUCOSE BLOOD TEST: CPT

## 2019-07-24 PROCEDURE — 73501 X-RAY EXAM HIP UNI 1 VIEW: CPT

## 2019-07-24 PROCEDURE — 77030012935 HC DRSG AQUACEL BMS -B: Performed by: ORTHOPAEDIC SURGERY

## 2019-07-24 PROCEDURE — 77030020788: Performed by: ORTHOPAEDIC SURGERY

## 2019-07-24 PROCEDURE — 72170 X-RAY EXAM OF PELVIS: CPT

## 2019-07-24 PROCEDURE — 74011000250 HC RX REV CODE- 250: Performed by: ORTHOPAEDIC SURGERY

## 2019-07-24 DEVICE — REFLECTION SPHERICAL HEAD SCREW 35MM
Type: IMPLANTABLE DEVICE | Site: HIP | Status: FUNCTIONAL
Brand: REFLECTION

## 2019-07-24 DEVICE — COMPONENT TOT HIP CAPPED FEM ADV HD POROUS OXIN XLPE R3: Type: IMPLANTABLE DEVICE | Site: HIP | Status: FUNCTIONAL

## 2019-07-24 DEVICE — OXINIUM FEMORAL HEAD 12/14 TAPER                                    36 MM XL/+12
Type: IMPLANTABLE DEVICE | Site: HIP | Status: FUNCTIONAL
Brand: OXINIUM

## 2019-07-24 DEVICE — POLARSTEM COLLAR STANDARD                                    NON-CEMENTED WITH TI/HA 3
Type: IMPLANTABLE DEVICE | Site: HIP | Status: FUNCTIONAL
Brand: POLARSTEM

## 2019-07-24 DEVICE — REFLECTION SPHERICAL HEAD SCREW 25MM
Type: IMPLANTABLE DEVICE | Site: HIP | Status: FUNCTIONAL
Brand: REFLECTION

## 2019-07-24 DEVICE — R3 3 HOLE ACETABULAR SHELL 56MM
Type: IMPLANTABLE DEVICE | Site: HIP | Status: FUNCTIONAL
Brand: R3 ACETABULAR

## 2019-07-24 DEVICE — REFLECTION INTERFIT THREADED HOLE COVER
Type: IMPLANTABLE DEVICE | Site: HIP | Status: FUNCTIONAL
Brand: REFLECTION

## 2019-07-24 RX ORDER — METOPROLOL SUCCINATE 50 MG/1
100 TABLET, EXTENDED RELEASE ORAL
Status: DISCONTINUED | OUTPATIENT
Start: 2019-07-25 | End: 2019-07-25 | Stop reason: HOSPADM

## 2019-07-24 RX ORDER — SODIUM CHLORIDE 0.9 % (FLUSH) 0.9 %
5-40 SYRINGE (ML) INJECTION EVERY 8 HOURS
Status: DISCONTINUED | OUTPATIENT
Start: 2019-07-24 | End: 2019-07-24 | Stop reason: HOSPADM

## 2019-07-24 RX ORDER — ONDANSETRON 2 MG/ML
4 INJECTION INTRAMUSCULAR; INTRAVENOUS
Status: DISCONTINUED | OUTPATIENT
Start: 2019-07-24 | End: 2019-07-25 | Stop reason: HOSPADM

## 2019-07-24 RX ORDER — AMLODIPINE BESYLATE 5 MG/1
10 TABLET ORAL
Status: DISCONTINUED | OUTPATIENT
Start: 2019-07-25 | End: 2019-07-25 | Stop reason: HOSPADM

## 2019-07-24 RX ORDER — MORPHINE SULFATE 2 MG/ML
2 INJECTION, SOLUTION INTRAMUSCULAR; INTRAVENOUS
Status: DISCONTINUED | OUTPATIENT
Start: 2019-07-24 | End: 2019-07-25 | Stop reason: HOSPADM

## 2019-07-24 RX ORDER — ROCURONIUM BROMIDE 10 MG/ML
INJECTION, SOLUTION INTRAVENOUS AS NEEDED
Status: DISCONTINUED | OUTPATIENT
Start: 2019-07-24 | End: 2019-07-24 | Stop reason: HOSPADM

## 2019-07-24 RX ORDER — ACETAMINOPHEN 500 MG
1000 TABLET ORAL ONCE
Status: COMPLETED | OUTPATIENT
Start: 2019-07-24 | End: 2019-07-24

## 2019-07-24 RX ORDER — MIDAZOLAM HYDROCHLORIDE 1 MG/ML
INJECTION, SOLUTION INTRAMUSCULAR; INTRAVENOUS
Status: DISPENSED
Start: 2019-07-24 | End: 2019-07-25

## 2019-07-24 RX ORDER — SODIUM CHLORIDE 0.9 % (FLUSH) 0.9 %
5-40 SYRINGE (ML) INJECTION AS NEEDED
Status: DISCONTINUED | OUTPATIENT
Start: 2019-07-24 | End: 2019-07-24 | Stop reason: HOSPADM

## 2019-07-24 RX ORDER — TAMSULOSIN HYDROCHLORIDE 0.4 MG/1
0.4 CAPSULE ORAL DAILY
Status: DISCONTINUED | OUTPATIENT
Start: 2019-07-25 | End: 2019-07-25 | Stop reason: HOSPADM

## 2019-07-24 RX ORDER — ONDANSETRON 2 MG/ML
INJECTION INTRAMUSCULAR; INTRAVENOUS AS NEEDED
Status: DISCONTINUED | OUTPATIENT
Start: 2019-07-24 | End: 2019-07-24 | Stop reason: HOSPADM

## 2019-07-24 RX ORDER — SODIUM CHLORIDE, SODIUM LACTATE, POTASSIUM CHLORIDE, CALCIUM CHLORIDE 600; 310; 30; 20 MG/100ML; MG/100ML; MG/100ML; MG/100ML
100 INJECTION, SOLUTION INTRAVENOUS CONTINUOUS
Status: DISCONTINUED | OUTPATIENT
Start: 2019-07-24 | End: 2019-07-24 | Stop reason: HOSPADM

## 2019-07-24 RX ORDER — POLYETHYLENE GLYCOL 3350 17 G/17G
17 POWDER, FOR SOLUTION ORAL DAILY
Status: DISCONTINUED | OUTPATIENT
Start: 2019-07-25 | End: 2019-07-25 | Stop reason: HOSPADM

## 2019-07-24 RX ORDER — FENTANYL CITRATE 50 UG/ML
INJECTION, SOLUTION INTRAMUSCULAR; INTRAVENOUS AS NEEDED
Status: DISCONTINUED | OUTPATIENT
Start: 2019-07-24 | End: 2019-07-24 | Stop reason: HOSPADM

## 2019-07-24 RX ORDER — CEFAZOLIN SODIUM/WATER 2 G/20 ML
2 SYRINGE (ML) INTRAVENOUS EVERY 8 HOURS
Status: COMPLETED | OUTPATIENT
Start: 2019-07-24 | End: 2019-07-25

## 2019-07-24 RX ORDER — ALBUMIN HUMAN 50 G/1000ML
SOLUTION INTRAVENOUS AS NEEDED
Status: DISCONTINUED | OUTPATIENT
Start: 2019-07-24 | End: 2019-07-24 | Stop reason: HOSPADM

## 2019-07-24 RX ORDER — LIDOCAINE HYDROCHLORIDE 10 MG/ML
0.1 INJECTION, SOLUTION EPIDURAL; INFILTRATION; INTRACAUDAL; PERINEURAL AS NEEDED
Status: DISCONTINUED | OUTPATIENT
Start: 2019-07-24 | End: 2019-07-24 | Stop reason: HOSPADM

## 2019-07-24 RX ORDER — SODIUM CHLORIDE, SODIUM LACTATE, POTASSIUM CHLORIDE, CALCIUM CHLORIDE 600; 310; 30; 20 MG/100ML; MG/100ML; MG/100ML; MG/100ML
INJECTION, SOLUTION INTRAVENOUS
Status: DISCONTINUED | OUTPATIENT
Start: 2019-07-24 | End: 2019-07-24 | Stop reason: HOSPADM

## 2019-07-24 RX ORDER — NALOXONE HYDROCHLORIDE 0.4 MG/ML
0.4 INJECTION, SOLUTION INTRAMUSCULAR; INTRAVENOUS; SUBCUTANEOUS AS NEEDED
Status: DISCONTINUED | OUTPATIENT
Start: 2019-07-24 | End: 2019-07-25 | Stop reason: HOSPADM

## 2019-07-24 RX ORDER — TRANEXAMIC ACID 100 MG/ML
INJECTION, SOLUTION INTRAVENOUS AS NEEDED
Status: DISCONTINUED | OUTPATIENT
Start: 2019-07-24 | End: 2019-07-24 | Stop reason: HOSPADM

## 2019-07-24 RX ORDER — FENTANYL CITRATE 50 UG/ML
50 INJECTION, SOLUTION INTRAMUSCULAR; INTRAVENOUS AS NEEDED
Status: DISCONTINUED | OUTPATIENT
Start: 2019-07-24 | End: 2019-07-24 | Stop reason: HOSPADM

## 2019-07-24 RX ORDER — SODIUM CHLORIDE 0.9 % (FLUSH) 0.9 %
5-40 SYRINGE (ML) INJECTION EVERY 8 HOURS
Status: DISCONTINUED | OUTPATIENT
Start: 2019-07-24 | End: 2019-07-25 | Stop reason: HOSPADM

## 2019-07-24 RX ORDER — ACETAMINOPHEN 500 MG
1000 TABLET ORAL EVERY 6 HOURS
Status: DISCONTINUED | OUTPATIENT
Start: 2019-07-24 | End: 2019-07-25 | Stop reason: HOSPADM

## 2019-07-24 RX ORDER — OXYCODONE HYDROCHLORIDE 5 MG/1
5-10 TABLET ORAL
Status: DISCONTINUED | OUTPATIENT
Start: 2019-07-24 | End: 2019-07-25 | Stop reason: HOSPADM

## 2019-07-24 RX ORDER — PROPOFOL 10 MG/ML
INJECTION, EMULSION INTRAVENOUS AS NEEDED
Status: DISCONTINUED | OUTPATIENT
Start: 2019-07-24 | End: 2019-07-24 | Stop reason: HOSPADM

## 2019-07-24 RX ORDER — SODIUM CHLORIDE 9 MG/ML
125 INJECTION, SOLUTION INTRAVENOUS CONTINUOUS
Status: DISCONTINUED | OUTPATIENT
Start: 2019-07-24 | End: 2019-07-25 | Stop reason: HOSPADM

## 2019-07-24 RX ORDER — MIDAZOLAM HYDROCHLORIDE 1 MG/ML
0.5 INJECTION, SOLUTION INTRAMUSCULAR; INTRAVENOUS
Status: COMPLETED | OUTPATIENT
Start: 2019-07-24 | End: 2019-07-24

## 2019-07-24 RX ORDER — SODIUM CHLORIDE 0.9 % (FLUSH) 0.9 %
5-40 SYRINGE (ML) INJECTION AS NEEDED
Status: DISCONTINUED | OUTPATIENT
Start: 2019-07-24 | End: 2019-07-25 | Stop reason: HOSPADM

## 2019-07-24 RX ORDER — EPHEDRINE SULFATE/0.9% NACL/PF 50 MG/5 ML
SYRINGE (ML) INTRAVENOUS AS NEEDED
Status: DISCONTINUED | OUTPATIENT
Start: 2019-07-24 | End: 2019-07-24 | Stop reason: HOSPADM

## 2019-07-24 RX ORDER — CELECOXIB 200 MG/1
200 CAPSULE ORAL 2 TIMES DAILY
Status: DISCONTINUED | OUTPATIENT
Start: 2019-07-24 | End: 2019-07-25 | Stop reason: HOSPADM

## 2019-07-24 RX ORDER — HYDROMORPHONE HYDROCHLORIDE 1 MG/ML
0.2 INJECTION, SOLUTION INTRAMUSCULAR; INTRAVENOUS; SUBCUTANEOUS
Status: COMPLETED | OUTPATIENT
Start: 2019-07-24 | End: 2019-07-24

## 2019-07-24 RX ORDER — SCOLOPAMINE TRANSDERMAL SYSTEM 1 MG/1
PATCH, EXTENDED RELEASE TRANSDERMAL AS NEEDED
Status: DISCONTINUED | OUTPATIENT
Start: 2019-07-24 | End: 2019-07-24 | Stop reason: HOSPADM

## 2019-07-24 RX ORDER — HYDROMORPHONE HYDROCHLORIDE 2 MG/ML
INJECTION, SOLUTION INTRAMUSCULAR; INTRAVENOUS; SUBCUTANEOUS AS NEEDED
Status: DISCONTINUED | OUTPATIENT
Start: 2019-07-24 | End: 2019-07-24 | Stop reason: HOSPADM

## 2019-07-24 RX ORDER — DEXAMETHASONE SODIUM PHOSPHATE 4 MG/ML
INJECTION, SOLUTION INTRA-ARTICULAR; INTRALESIONAL; INTRAMUSCULAR; INTRAVENOUS; SOFT TISSUE AS NEEDED
Status: DISCONTINUED | OUTPATIENT
Start: 2019-07-24 | End: 2019-07-24 | Stop reason: HOSPADM

## 2019-07-24 RX ORDER — MIDAZOLAM HYDROCHLORIDE 1 MG/ML
1 INJECTION, SOLUTION INTRAMUSCULAR; INTRAVENOUS AS NEEDED
Status: DISCONTINUED | OUTPATIENT
Start: 2019-07-24 | End: 2019-07-24 | Stop reason: HOSPADM

## 2019-07-24 RX ORDER — ROPIVACAINE HYDROCHLORIDE 5 MG/ML
150 INJECTION, SOLUTION EPIDURAL; INFILTRATION; PERINEURAL AS NEEDED
Status: DISCONTINUED | OUTPATIENT
Start: 2019-07-24 | End: 2019-07-24 | Stop reason: HOSPADM

## 2019-07-24 RX ORDER — TRAMADOL HYDROCHLORIDE 50 MG/1
50 TABLET ORAL
Status: DISCONTINUED | OUTPATIENT
Start: 2019-07-24 | End: 2019-07-25 | Stop reason: HOSPADM

## 2019-07-24 RX ORDER — FENTANYL CITRATE 50 UG/ML
25 INJECTION, SOLUTION INTRAMUSCULAR; INTRAVENOUS
Status: DISCONTINUED | OUTPATIENT
Start: 2019-07-24 | End: 2019-07-24 | Stop reason: HOSPADM

## 2019-07-24 RX ORDER — MIDAZOLAM HYDROCHLORIDE 1 MG/ML
INJECTION, SOLUTION INTRAMUSCULAR; INTRAVENOUS AS NEEDED
Status: DISCONTINUED | OUTPATIENT
Start: 2019-07-24 | End: 2019-07-24 | Stop reason: HOSPADM

## 2019-07-24 RX ORDER — FENTANYL CITRATE 50 UG/ML
INJECTION, SOLUTION INTRAMUSCULAR; INTRAVENOUS
Status: DISPENSED
Start: 2019-07-24 | End: 2019-07-25

## 2019-07-24 RX ORDER — DIPHENHYDRAMINE HCL 12.5MG/5ML
12.5 ELIXIR ORAL
Status: DISCONTINUED | OUTPATIENT
Start: 2019-07-24 | End: 2019-07-25 | Stop reason: HOSPADM

## 2019-07-24 RX ORDER — FACIAL-BODY WIPES
10 EACH TOPICAL DAILY PRN
Status: DISCONTINUED | OUTPATIENT
Start: 2019-07-26 | End: 2019-07-25 | Stop reason: HOSPADM

## 2019-07-24 RX ORDER — SUCCINYLCHOLINE CHLORIDE 20 MG/ML
INJECTION INTRAMUSCULAR; INTRAVENOUS AS NEEDED
Status: DISCONTINUED | OUTPATIENT
Start: 2019-07-24 | End: 2019-07-24 | Stop reason: HOSPADM

## 2019-07-24 RX ORDER — AMOXICILLIN 250 MG
1 CAPSULE ORAL 2 TIMES DAILY
Status: DISCONTINUED | OUTPATIENT
Start: 2019-07-24 | End: 2019-07-25 | Stop reason: HOSPADM

## 2019-07-24 RX ORDER — DIPHENHYDRAMINE HYDROCHLORIDE 50 MG/ML
12.5 INJECTION, SOLUTION INTRAMUSCULAR; INTRAVENOUS AS NEEDED
Status: DISCONTINUED | OUTPATIENT
Start: 2019-07-24 | End: 2019-07-24 | Stop reason: HOSPADM

## 2019-07-24 RX ORDER — CEFAZOLIN SODIUM/WATER 2 G/20 ML
2 SYRINGE (ML) INTRAVENOUS ONCE
Status: COMPLETED | OUTPATIENT
Start: 2019-07-24 | End: 2019-07-24

## 2019-07-24 RX ORDER — CELECOXIB 200 MG/1
200 CAPSULE ORAL ONCE
Status: COMPLETED | OUTPATIENT
Start: 2019-07-24 | End: 2019-07-24

## 2019-07-24 RX ADMIN — FENTANYL CITRATE 50 MCG: 50 INJECTION, SOLUTION INTRAMUSCULAR; INTRAVENOUS at 13:49

## 2019-07-24 RX ADMIN — MIDAZOLAM HYDROCHLORIDE 0.5 MG: 1 INJECTION, SOLUTION INTRAMUSCULAR; INTRAVENOUS at 16:44

## 2019-07-24 RX ADMIN — HYDROMORPHONE HYDROCHLORIDE 0.2 MG: 1 INJECTION, SOLUTION INTRAMUSCULAR; INTRAVENOUS; SUBCUTANEOUS at 18:00

## 2019-07-24 RX ADMIN — HYDROMORPHONE HYDROCHLORIDE 0.5 MG: 2 INJECTION, SOLUTION INTRAMUSCULAR; INTRAVENOUS; SUBCUTANEOUS at 15:55

## 2019-07-24 RX ADMIN — Medication 10 MG: at 15:35

## 2019-07-24 RX ADMIN — ACETAMINOPHEN 1000 MG: 500 TABLET ORAL at 23:43

## 2019-07-24 RX ADMIN — FENTANYL CITRATE 25 MCG: 50 INJECTION, SOLUTION INTRAMUSCULAR; INTRAVENOUS at 16:30

## 2019-07-24 RX ADMIN — ONDANSETRON 4 MG: 2 INJECTION INTRAMUSCULAR; INTRAVENOUS at 15:52

## 2019-07-24 RX ADMIN — Medication 10 MG: at 15:41

## 2019-07-24 RX ADMIN — FENTANYL CITRATE 50 MCG: 50 INJECTION, SOLUTION INTRAMUSCULAR; INTRAVENOUS at 13:21

## 2019-07-24 RX ADMIN — HYDROMORPHONE HYDROCHLORIDE 0.2 MG: 1 INJECTION, SOLUTION INTRAMUSCULAR; INTRAVENOUS; SUBCUTANEOUS at 17:00

## 2019-07-24 RX ADMIN — FENTANYL CITRATE 50 MCG: 50 INJECTION, SOLUTION INTRAMUSCULAR; INTRAVENOUS at 15:23

## 2019-07-24 RX ADMIN — CELECOXIB 200 MG: 200 CAPSULE ORAL at 12:12

## 2019-07-24 RX ADMIN — FENTANYL CITRATE 50 MCG: 50 INJECTION, SOLUTION INTRAMUSCULAR; INTRAVENOUS at 13:12

## 2019-07-24 RX ADMIN — ROCURONIUM BROMIDE 10 MG: 10 INJECTION, SOLUTION INTRAVENOUS at 13:12

## 2019-07-24 RX ADMIN — SODIUM CHLORIDE, SODIUM LACTATE, POTASSIUM CHLORIDE, CALCIUM CHLORIDE: 600; 310; 30; 20 INJECTION, SOLUTION INTRAVENOUS at 13:00

## 2019-07-24 RX ADMIN — SUCCINYLCHOLINE CHLORIDE 180 MG: 20 INJECTION INTRAMUSCULAR; INTRAVENOUS at 13:13

## 2019-07-24 RX ADMIN — FENTANYL CITRATE 25 MCG: 50 INJECTION, SOLUTION INTRAMUSCULAR; INTRAVENOUS at 16:47

## 2019-07-24 RX ADMIN — ALBUMIN HUMAN 250 ML: 50 SOLUTION INTRAVENOUS at 15:06

## 2019-07-24 RX ADMIN — OXYCODONE HYDROCHLORIDE 10 MG: 5 TABLET ORAL at 23:43

## 2019-07-24 RX ADMIN — FENTANYL CITRATE 25 MCG: 50 INJECTION, SOLUTION INTRAMUSCULAR; INTRAVENOUS at 16:36

## 2019-07-24 RX ADMIN — ROCURONIUM BROMIDE 10 MG: 10 INJECTION, SOLUTION INTRAVENOUS at 13:50

## 2019-07-24 RX ADMIN — DIPHENHYDRAMINE HYDROCHLORIDE 12.5 MG: 50 INJECTION, SOLUTION INTRAMUSCULAR; INTRAVENOUS at 18:18

## 2019-07-24 RX ADMIN — ACETAMINOPHEN 1000 MG: 500 TABLET ORAL at 19:29

## 2019-07-24 RX ADMIN — FENTANYL CITRATE 50 MCG: 50 INJECTION, SOLUTION INTRAMUSCULAR; INTRAVENOUS at 14:55

## 2019-07-24 RX ADMIN — SCOLOPAMINE TRANSDERMAL SYSTEM 1 PATCH: 1 PATCH, EXTENDED RELEASE TRANSDERMAL at 13:00

## 2019-07-24 RX ADMIN — HYDROMORPHONE HYDROCHLORIDE 0.5 MG: 2 INJECTION, SOLUTION INTRAMUSCULAR; INTRAVENOUS; SUBCUTANEOUS at 16:20

## 2019-07-24 RX ADMIN — CELECOXIB 200 MG: 200 CAPSULE ORAL at 22:44

## 2019-07-24 RX ADMIN — DEXAMETHASONE SODIUM PHOSPHATE 4 MG: 4 INJECTION, SOLUTION INTRA-ARTICULAR; INTRALESIONAL; INTRAMUSCULAR; INTRAVENOUS; SOFT TISSUE at 13:15

## 2019-07-24 RX ADMIN — MIDAZOLAM HYDROCHLORIDE 0.5 MG: 1 INJECTION, SOLUTION INTRAMUSCULAR; INTRAVENOUS at 17:11

## 2019-07-24 RX ADMIN — Medication 5 MG: at 13:36

## 2019-07-24 RX ADMIN — FENTANYL CITRATE 25 MCG: 50 INJECTION, SOLUTION INTRAMUSCULAR; INTRAVENOUS at 16:52

## 2019-07-24 RX ADMIN — HYDROMORPHONE HYDROCHLORIDE 0.2 MG: 1 INJECTION, SOLUTION INTRAMUSCULAR; INTRAVENOUS; SUBCUTANEOUS at 16:45

## 2019-07-24 RX ADMIN — OXYCODONE HYDROCHLORIDE 10 MG: 5 TABLET ORAL at 19:28

## 2019-07-24 RX ADMIN — MIDAZOLAM HYDROCHLORIDE 0.5 MG: 1 INJECTION, SOLUTION INTRAMUSCULAR; INTRAVENOUS at 16:54

## 2019-07-24 RX ADMIN — SODIUM CHLORIDE, POTASSIUM CHLORIDE, SODIUM LACTATE AND CALCIUM CHLORIDE 100 ML/HR: 600; 310; 30; 20 INJECTION, SOLUTION INTRAVENOUS at 12:00

## 2019-07-24 RX ADMIN — ROCURONIUM BROMIDE 40 MG: 10 INJECTION, SOLUTION INTRAVENOUS at 13:20

## 2019-07-24 RX ADMIN — HYDROMORPHONE HYDROCHLORIDE 0.2 MG: 1 INJECTION, SOLUTION INTRAMUSCULAR; INTRAVENOUS; SUBCUTANEOUS at 17:30

## 2019-07-24 RX ADMIN — SODIUM CHLORIDE 125 ML/HR: 900 INJECTION, SOLUTION INTRAVENOUS at 17:00

## 2019-07-24 RX ADMIN — ACETAMINOPHEN 1000 MG: 500 TABLET ORAL at 12:11

## 2019-07-24 RX ADMIN — MIDAZOLAM HYDROCHLORIDE 0.5 MG: 1 INJECTION, SOLUTION INTRAMUSCULAR; INTRAVENOUS at 16:33

## 2019-07-24 RX ADMIN — HYDROMORPHONE HYDROCHLORIDE 0.5 MG: 2 INJECTION, SOLUTION INTRAMUSCULAR; INTRAVENOUS; SUBCUTANEOUS at 15:15

## 2019-07-24 RX ADMIN — HYDROMORPHONE HYDROCHLORIDE 0.2 MG: 1 INJECTION, SOLUTION INTRAMUSCULAR; INTRAVENOUS; SUBCUTANEOUS at 17:15

## 2019-07-24 RX ADMIN — Medication 15 MG: at 14:16

## 2019-07-24 RX ADMIN — ONDANSETRON 4 MG: 2 INJECTION INTRAMUSCULAR; INTRAVENOUS at 13:15

## 2019-07-24 RX ADMIN — MIDAZOLAM HYDROCHLORIDE 2 MG: 1 INJECTION, SOLUTION INTRAMUSCULAR; INTRAVENOUS at 13:00

## 2019-07-24 RX ADMIN — Medication 2 G: at 13:20

## 2019-07-24 RX ADMIN — PROPOFOL 200 MG: 10 INJECTION, EMULSION INTRAVENOUS at 13:12

## 2019-07-24 RX ADMIN — ROCURONIUM BROMIDE 10 MG: 10 INJECTION, SOLUTION INTRAVENOUS at 14:30

## 2019-07-24 RX ADMIN — Medication 2 G: at 19:33

## 2019-07-24 NOTE — H&P
Date of Surgery Update:  Jessie Rooney was seen and examined. History and physical has been reviewed. The patient has been examined.  There have been no significant clinical changes since the completion of the originally dated History and Physical.    Signed By: Leticia Drake MD     July 24, 2019 12:17 PM

## 2019-07-24 NOTE — OP NOTES
Name: Jaimie Joseph  MRN:  [id number]  : 1955  Age:  61 y.o. Surgery Date: 2019      OPERATIVE REPORT - LEFT TOTAL HIP ARTHROPLASTY -   ANTERIOR APPROACH    PREOPERATIVE DIAGNOSIS: Osteoarthritis, left hip. POSTOPERATIVE DIAGNOSIS: Osteoarthritis, left hip. PROCEDURE PERFORMED: Left total hip arthroplasty. SURGEON: Melissa Howard MD    FIRST ASSISTANT: Garland Fitzpatrick PA-C    ANESTHESIA: General    PRE-OP ANTIBIOTIC: Ancef 2g    COMPLICATIONS: None. ESTIMATED BLOOD LOSS: 300 mL. SPECIMENS REMOVED: None. COMPONENTS IMPLANTED:   Implant Name Type Inv. Item Serial No.  Lot No. LRB No. Used Action   SHELL COATED 3 HOLE HEMISHERICAL 56MM - SNA Joint Component SHELL COATED 3 HOLE HEMISHERICAL 56MM NA NO \T NEPH WOUND CARE [id number] Left 1 Implanted   Acetabular Liner   NA NO &amp; [employer] [id number] Left 1 Implanted   SCREW HEAD SPHERICAL CANCELLOUS 25MM - SNA Screw SCREW HEAD SPHERICAL CANCELLOUS 25MM NA NO T NEPH WOUND CARE [id number] Left 1 Implanted   SCREW HEAD SPHERICAL CANCELLOUS 35MM - SNA Screw SCREW HEAD SPHERICAL CANCELLOUS 35MM NA ON T NEPH WOUND CARE [id number] Left 1 Implanted   [address]   NA [employer] [id number] Left 1 Implanted   POLARSTEM STEM STD    NA  [id number] Left 1 Implanted   HEAD FEMORAL [id number]/14 36 MM +12 - SNA Joint Component HEAD FEMORAL [id number]/14 36 MM +12 NA NO T NEPH WOUND CARE [id number] Left 1 Implanted       INDICATIONS: The patient is an 61 yrs male with progressive debilitating left hip and groin pain due to severe osteoarthritis. Symptoms have progressed despite comprehensive conservative treatment and he presents for left total hip replacement. Risks, benefits, alternatives of the procedure were reviewed in detail and he desires to proceed. The patient understands the increased risk for perioperative medical complications due to medical comorbidities.       DESCRIPTION OF PROCEDURE: Anesthetic was initiated. Preoperative dose of IV antibiotic was given. Cordon catheter was not placed. The left side was confirmed as the operative side, prepped and draped in the usual sterile fashion. Skin was covered with Ioban occlusive dressing. Direct anterior exposure was made to the patient's hip through the sartorius tensor interval. Anterior hip vasculature was cauterized. Retractors were taken out to observe for bleeding and there was none. The capsule was identified, opened and T'd distally. The femoral neck was osteotomized. Femoral head was removed from the acetabulum, which was exposed and soft tissues were removed. The acetabulum was progressively  reamed to 55 mm and a 56 mm trial shell was impacted with good press-fit. This was removed and a 56 mm S&N shell was impacted in the acetabulum in 40 degrees of abduction in an anatomic-type anteversion. Bone spurs were removed and 6.5 screws x2 were placed. The polyethylene liner was placed. Femur was positioned and elevated from the wound. The medullary canal was entered. Flexible reamers were utilized as the patient did have a narrowed femoral canal, broached to a size 3. Calcar planed and then trialed. A 36 mm , +12 hip ball was the most appropriate for leg length and tension with a standard offset stem. The hip was dislocated. The anterior greater trochanter was trimmed down to enhance flexion, rotation and stability. The trial was removed and the real stem was impacted. The real hip ball was placed. The hip was reduced. After copious irrigation, the capsule was closed with #2 Vicryl sutures. I irrigated the skin, subcutaneous and deep wound. I closed the fascia of the tensor fascia jeaneth with #2 Vicryl sutures. Skin and subcutaneous were irrigated. Soft tissues were infiltrated with local anesthetic. Skin and subcutaneous were closed in a standard fashion. Sterile dressing was applied. There were no complications.  No specimen was sent. The procedure was a LEFT TOTAL HIP REPLACEMENT using a S&N total hip construct. The patient was transferred to the recovery room in stable condition. Steve Rebollar PA-C was critical throughout the case to assist with positioning, retraction and closure. There were no other available residents or surgical assistants to assist during this procedure.     Rony Rao MD

## 2019-07-24 NOTE — ANESTHESIA POSTPROCEDURE EVALUATION
Procedure(s):  LEFT TOTAL HIP ARTHROPLASTY ANTERIOR APPROACH. general    Anesthesia Post Evaluation        Patient location during evaluation: PACU  Patient participation: complete - patient participated  Level of consciousness: awake and alert  Pain management: adequate  Airway patency: patent  Anesthetic complications: no  Cardiovascular status: acceptable  Respiratory status: acceptable  Hydration status: acceptable  Comments: I have seen and evaluated the patient and is ready for discharge. Kwaku Barajas MD    Post anesthesia nausea and vomiting:  none      Vitals Value Taken Time   /82 7/24/2019  4:45 PM   Temp 36.8 °C (98.3 °F) 7/24/2019  4:22 PM   Pulse 66 7/24/2019  4:46 PM   Resp 11 7/24/2019  4:46 PM   SpO2 93 % 7/24/2019  4:46 PM   Vitals shown include unvalidated device data.

## 2019-07-24 NOTE — ANESTHESIA PREPROCEDURE EVALUATION
Relevant Problems   No relevant active problems       Anesthetic History   No history of anesthetic complications  PONV          Review of Systems / Medical History  Patient summary reviewed, nursing notes reviewed and pertinent labs reviewed    Pulmonary  Within defined limits                 Neuro/Psych   Within defined limits           Cardiovascular  Within defined limits  Hypertension        Dysrhythmias            GI/Hepatic/Renal  Within defined limits              Endo/Other  Within defined limits      Arthritis     Other Findings              Physical Exam    Airway  Mallampati: II  TM Distance: > 6 cm  Neck ROM: normal range of motion   Mouth opening: Normal     Cardiovascular  Regular rate and rhythm,  S1 and S2 normal,  no murmur, click, rub, or gallop             Dental  No notable dental hx       Pulmonary  Breath sounds clear to auscultation               Abdominal  GI exam deferred       Other Findings            Anesthetic Plan    ASA: 2  Anesthesia type: general          Induction: Intravenous  Anesthetic plan and risks discussed with: Patient

## 2019-07-25 VITALS
DIASTOLIC BLOOD PRESSURE: 67 MMHG | TEMPERATURE: 98 F | BODY MASS INDEX: 30.57 KG/M2 | WEIGHT: 207 LBS | HEART RATE: 65 BPM | RESPIRATION RATE: 16 BRPM | SYSTOLIC BLOOD PRESSURE: 121 MMHG | OXYGEN SATURATION: 93 %

## 2019-07-25 LAB
ANION GAP SERPL CALC-SCNC: 9 MMOL/L (ref 5–15)
BUN SERPL-MCNC: 28 MG/DL (ref 6–20)
BUN/CREAT SERPL: 16 (ref 12–20)
CALCIUM SERPL-MCNC: 8.1 MG/DL (ref 8.5–10.1)
CHLORIDE SERPL-SCNC: 110 MMOL/L (ref 97–108)
CO2 SERPL-SCNC: 23 MMOL/L (ref 21–32)
CREAT SERPL-MCNC: 1.75 MG/DL (ref 0.7–1.3)
GLUCOSE SERPL-MCNC: 188 MG/DL (ref 65–100)
HGB BLD-MCNC: 13.2 G/DL (ref 12.1–17)
POTASSIUM SERPL-SCNC: 3.7 MMOL/L (ref 3.5–5.1)
SODIUM SERPL-SCNC: 142 MMOL/L (ref 136–145)

## 2019-07-25 PROCEDURE — 97161 PT EVAL LOW COMPLEX 20 MIN: CPT

## 2019-07-25 PROCEDURE — 74011250637 HC RX REV CODE- 250/637: Performed by: PHYSICIAN ASSISTANT

## 2019-07-25 PROCEDURE — 77030027138 HC INCENT SPIROMETER -A

## 2019-07-25 PROCEDURE — 97165 OT EVAL LOW COMPLEX 30 MIN: CPT

## 2019-07-25 PROCEDURE — 85018 HEMOGLOBIN: CPT

## 2019-07-25 PROCEDURE — 36415 COLL VENOUS BLD VENIPUNCTURE: CPT

## 2019-07-25 PROCEDURE — 77010033678 HC OXYGEN DAILY

## 2019-07-25 PROCEDURE — 74011250636 HC RX REV CODE- 250/636: Performed by: PHYSICIAN ASSISTANT

## 2019-07-25 PROCEDURE — 97116 GAIT TRAINING THERAPY: CPT

## 2019-07-25 PROCEDURE — 80048 BASIC METABOLIC PNL TOTAL CA: CPT

## 2019-07-25 RX ORDER — OXYCODONE HYDROCHLORIDE 5 MG/1
5 TABLET ORAL
Qty: 60 TAB | Refills: 0 | Status: SHIPPED | OUTPATIENT
Start: 2019-07-25 | End: 2019-08-24

## 2019-07-25 RX ORDER — ONDANSETRON 8 MG/1
4 TABLET, ORALLY DISINTEGRATING ORAL
Qty: 30 TAB | Refills: 0 | Status: SHIPPED | OUTPATIENT
Start: 2019-07-25 | End: 2019-07-25 | Stop reason: SDUPTHER

## 2019-07-25 RX ORDER — AMOXICILLIN 250 MG
1 CAPSULE ORAL 2 TIMES DAILY
Qty: 30 TAB | Refills: 0 | Status: SHIPPED | OUTPATIENT
Start: 2019-07-25 | End: 2019-12-18 | Stop reason: ALTCHOICE

## 2019-07-25 RX ORDER — ONDANSETRON 4 MG/1
4 TABLET, ORALLY DISINTEGRATING ORAL
Qty: 30 TAB | Refills: 0 | Status: SHIPPED | OUTPATIENT
Start: 2019-07-25 | End: 2019-12-18 | Stop reason: ALTCHOICE

## 2019-07-25 RX ADMIN — SENNOSIDES, DOCUSATE SODIUM 1 TABLET: 50; 8.6 TABLET, FILM COATED ORAL at 08:51

## 2019-07-25 RX ADMIN — OXYCODONE HYDROCHLORIDE 10 MG: 5 TABLET ORAL at 05:50

## 2019-07-25 RX ADMIN — TAMSULOSIN HYDROCHLORIDE 0.4 MG: 0.4 CAPSULE ORAL at 08:51

## 2019-07-25 RX ADMIN — APIXABAN 2.5 MG: 2.5 TABLET, FILM COATED ORAL at 08:51

## 2019-07-25 RX ADMIN — Medication 2 G: at 04:20

## 2019-07-25 RX ADMIN — OXYCODONE HYDROCHLORIDE 10 MG: 5 TABLET ORAL at 02:26

## 2019-07-25 RX ADMIN — METOPROLOL SUCCINATE 100 MG: 50 TABLET, EXTENDED RELEASE ORAL at 08:50

## 2019-07-25 RX ADMIN — OXYCODONE HYDROCHLORIDE 10 MG: 5 TABLET ORAL at 08:50

## 2019-07-25 RX ADMIN — OXYCODONE HYDROCHLORIDE 10 MG: 5 TABLET ORAL at 11:54

## 2019-07-25 RX ADMIN — POLYETHYLENE GLYCOL 3350 17 G: 17 POWDER, FOR SOLUTION ORAL at 08:51

## 2019-07-25 RX ADMIN — ACETAMINOPHEN 1000 MG: 500 TABLET ORAL at 05:50

## 2019-07-25 RX ADMIN — CELECOXIB 200 MG: 200 CAPSULE ORAL at 06:55

## 2019-07-25 NOTE — PROGRESS NOTES
Problem: Mobility Impaired (Adult and Pediatric)  Goal: *Acute Goals and Plan of Care (Insert Text)  Description  Physical Therapy Goals  Initiated 7/25/2019    1. Patient will move from supine to sit and sit to supine , scoot up and down and roll side to side in bed with modified independence within 4 days. 2. Patient will perform sit to stand with modified independence within 4 days. 3. Patient will ambulate with modified independence for 150 feet with the least restrictive device within 4 days. 4. Patient will ascend/descend 4 stairs with one crutch and one handrail with modified independence within 4 days. 5. Patient will perform post-YARIEL home exercise program per protocol with independence within 4 days. Outcome: Resolved/Met   PHYSICAL THERAPY EVALUATION  Patient: Kathy Moreno (49 y.o. male)  Date: 7/25/2019  Primary Diagnosis: Osteoarthritis of left hip [M16.12]  Procedure(s) (LRB):  LEFT TOTAL HIP ARTHROPLASTY ANTERIOR APPROACH (Left) 1 Day Post-Op   Precautions: FALL      ASSESSMENT :  Based on the objective data described below, the patient presents with  impairment in functional mobility, activity tolerance and balance s/p L YARIEL. PLOF: Independent with ADLs and IADLs. Still works and drives. Lives in 2 story home (14 steps) but has first floor master; 4 steps to enter the home with rails. Patient attended pre-op JOINT CLASS for his R YARIEL, is independent with post-op YARIEL exercise protocol and has same in written, illlustrated form. Patient performed all mobility with modified independence. He ambulated 300 ft with 2 crutches, step-through gait, safely and steadily. Negotiated 4 steps with modified independence using one crutch and one rail. Patient is cleared for discharge from PT standpoint. He will benefit from [address] [place] [address] [facility] PT in order to achieve maximum level of safe, functional mobility, balance and return to independent PLOF.      Patient will benefit from skilled intervention to address the above impairments. Patients rehabilitation potential is considered to be Excellent  Factors which may influence rehabilitation potential include:   ? None noted  ? Mental ability/status  ? Medical condition  ? Home/family situation and support systems  ? Safety awareness  ? Pain tolerance/management  ? Other:      PLAN :  Recommendations and Planned Interventions:  ?           Bed Mobility Training             ? Neuromuscular Re-Education  ? Transfer Training                   ? Orthotic/Prosthetic Training  ? Gait Training                         ? Modalities  ? Therapeutic Exercises           ? Edema Management/Control  ? Therapeutic Activities            ? Patient and Family Training/Education  ? Other (comment):    Frequency/Duration: Patient will be followed by physical therapy  twice daily to address goals. Discharge Recommendations: Home Health  Further Equipment Recommendations for Discharge: None-has crutches and cane at home. SUBJECTIVE:   Patient stated I am ready to go home.     OBJECTIVE DATA SUMMARY:   HISTORY:    Past Medical History:   Diagnosis Date    Arrhythmia     AFIB    Arthritis     Atrial fibrillation ([facility] [address] 2017    Chronic pain     RIGHT HIP    High cholesterol     Hypertension     Nausea & vomiting     Pulmonary embolism ([facility] [address] 1999     Past Surgical History:   Procedure Laterality Date    HX ADENOIDECTOMY  1960    HX HEENT  1989    SEPTOPLASTY    HX SHOULDER ARTHROSCOPY Left 2018    HX TONSILLECTOMY  1960    HX VASECTOMY  2012    TOTAL HIP ARTHROPLASTY Right 2017     Prior Level of Function/Home Situation: PLOF: Independent with ADLs and IADLs. Personal factors and/or comorbidities impacting plan of care:  Motivated/A & O x 4/Supportive Family     Home Situation  Home Environment: Private residence  # Steps to Enter: 3  One/Two Story Residence: Two story, live on 1st floor  # of Interior Steps: 14  Lift Chair Available: No  Living Alone: No  Support Systems: Spouse/Significant Other/Partner  Patient Expects to be Discharged to[de-identified] Private residence  Current DME Used/Available at Home: alex Brennan    EXAMINATION/PRESENTATION/DECISION MAKING:   Critical Behavior:  Neurologic State: Alert  Orientation Level: Appropriate for age  Cognition: Appropriate decision making, Appropriate safety awareness  Safety/Judgement: Awareness of environment  Hearing: Auditory  Auditory Impairment: None    Range Of Motion:  AROM: Within functional limits           PROM: Within functional limits           Strength:    Strength: Within functional limits                    Tone & Sensation:   Tone: Normal              Sensation: Intact               Coordination:  Coordination: Within functional limits  Vision:   Acuity: Within Defined Limits  Corrective Lenses: Glasses  Functional Mobility:  Bed Mobility:  Rolling: Modified independent  Supine to Sit: Modified independent  Sit to Supine: Modified independent  Scooting: Modified independent  Transfers:  Sit to Stand: Supervision  Stand to Sit: Supervision        Bed to Chair: Supervision              Balance:   Sitting: Intact  Standing: Intact; With support  Ambulation/Gait Training:  Distance (ft): 300 Feet (ft)  Assistive Device: Crutches;Gait belt  Ambulation - Level of Assistance: Modified independent        Gait Abnormalities: Antalgic     Left Side Weight Bearing: As tolerated  Base of Support: Widened  Stance: Left decreased  Speed/Aysha: Slow  Step Length: Right shortened  Swing Pattern: Left asymmetrical        Stairs:  Number of Stairs Trained: 4  Stairs - Level of Assistance: Modified independent   Rail Use: Right (One Rail and one crutch)    Therapeutic Exercises:   Patient attended pre-op JOINT CLASS, is independent with post-op YARIEL exercise protocol and has same in written, illlustrated form.      Functional Measure:  Barthel Index:    Bathin  Bladder: 10  Bowels: 10  Groomin  Dressing: 10  Feeding: 10  Mobility: 15  Stairs: 5  Toilet Use: 10  Transfer (Bed to Chair and Back): 10  Total: 90/100       Percentage of impairment   0%   1-19%   20-39%   40-59%   60-79%   80-99%   100%   Barthel Score 0-100 100 99-80 79-60 59-40 20-39 1-19   0     The Barthel ADL Index: Guidelines  1. The index should be used as a record of what a patient does, not as a record of what a patient could do. 2. The main aim is to establish degree of independence from any help, physical or verbal, however minor and for whatever reason. 3. The need for supervision renders the patient not independent. 4. A patient's performance should be established using the best available evidence. Asking the patient, friends/relatives and nurses are the usual sources, but direct observation and common sense are also important. However direct testing is not needed. 5. Usually the patient's performance over the preceding 24-48 hours is important, but occasionally longer periods will be relevant. 6. Middle categories imply that the patient supplies over 50 per cent of the effort. 7. Use of aids to be independent is allowed. Nilsa Marshall., Barthel, D.W. (8218). Functional evaluation: the Barthel Index. [address] [facility] (14)2. [facility] ERIC Oliveira, Mabel Razo., [facility] [place], [address] [place] [address] (). Measuring the change indisability after inpatient rehabilitation; comparison of the responsiveness of the Barthel Index and Functional Crawford Measure. Journal of Neurology, Neurosurgery, and Psychiatry, 66(4), 397-420. Bj Baugh NFARTUN.A, ELIANE Das, & CRESENCIO LoveA. (2004.) Assessment of post-stroke quality of life in cost-effectiveness studies: The usefulness of the Barthel Index and the EuroQoL-5D.  Quality of Life Research, 15, 883-92           Physical Therapy Evaluation Charge Determination   History Examination Presentation Decision-Making   LOW Complexity : Zero comorbidities / personal factors that will impact the outcome / POC LOW Complexity : 1-2 Standardized tests and measures addressing body structure, function, activity limitation and / or participation in recreation  LOW Complexity : Stable, uncomplicated  LOW Complexity : FOTO score of       Based on the above components, the patient evaluation is determined to be of the following complexity level: LOW     Pain:  Pain Scale 1: Numeric (0 - 10)  Pain Intensity 1: 7  Pain Location 1: Hip  Pain Orientation 1: Left  Pain Description 1: Aching  Pain Intervention(s) 1: Medication (see MAR)    Activity Tolerance:   Good    After treatment:   ?         Patient left in no apparent distress sitting up in chair  ? Patient left in no apparent distress in bed  ? Call bell left within reach  ? Nursing notified  ? Caregiver present  ? Bed alarm activated    COMMUNICATION/EDUCATION:   The patients plan of care was discussed with: Occupational Therapist, Registered Nurse and . ?         Fall prevention education was provided and the patient/caregiver indicated understanding. ? Patient/family have participated as able in goal setting and plan of care. ?         Patient/family agree to work toward stated goals and plan of care. ?         Patient understands intent and goals of therapy, but is neutral about his/her participation. ? Patient is unable to participate in goal setting and plan of care.     Thank you for this referral.  Judy Michael   Time Calculation: 40 mins

## 2019-07-25 NOTE — DISCHARGE INSTRUCTIONS
Discharge Instructions Anterior Approach   Hip Replacement-Dr. Xi Disla    Patient Yo Reyes  Date of procedure:7/24/2019    Procedure:Procedure(s):  LEFT TOTAL HIP ARTHROPLASTY ANTERIOR APPROACH  Surgeon:Surgeon(s) and Role:     * Julieta Celaya MD - Primary   PCP: Taran Bryant MD  Date of discharge: 7/25/19     Follow up appointments   Follow up with Dr. Xi Disla in 3 weeks. Call [phone] to make an appointment.  If home health has been arranged for you the agency will contact you to arrange dates/times for visits. Please call them if you do not hear from them within 24 hours after you are discharged    When to call your Orthopaedic Surgeon: Call [phone]. If you need to reach us after 5pm or on a weekend; call [phone] and the on call physician will be contacted   Increased hip pain, unrelieved pain or if you have difficulty or are unable to walk   Signs of infection-if your incision is red; continues to have drainage; drainage has a foul odor or if you have a persistent fever over 101 degrees   Signs of a blood clot in your leg-calf pain, tenderness, redness, swelling of lower leg    When to call your Primary Care Physician:   Concerns about medical conditions such as diabetes, high blood pressure, asthma, congestive heart failure   Call if blood sugars are elevated, persistent headache or dizziness, coughing or congestion, constipation or diarrhea, burning with urination, abnormal heart rate    When to call [phone] go to the nearest emergency room   Sudden onset of chest pain, shortness of breath, difficulty breathing    Activity   Weight bearing as tolerated with walker or crutches.  Refer to your patient handbook for instructions and pictures   Complete your Home Exercise Program daily as instructed by your therapist.  Refer to your handbook for instructions and pictures   Get up every one hour and walk (except at night when sleeping)   AVOID sudden and extreme movement of your hip (surgical leg)   Do not drive or operate heavy machinery    Incision Care     The Aquacel (brown, waterproof) surgical dressing is to remain on your hip for 7 days. On the 7th day have someone gently peel the dressing off by carefully lifting the edge and stretching it slightly to break the adhesive seal and leave incision open to air. You may take a shower with the Aquacel dressing in place.  You do not have staples in your hip incision. If present they will be removed by the Home Health staff in 10-14 days and steri-strips will be applied. Leave the steri-steips on until they fall off.  Once the Aquacel is removed, you may get your incision wet but do not submerge your incision under water in a bath tub, hot tub or swimming pool for 6 weeks after surgery. Preventing blood clots   Take Eliquis as prescribed    Pain management   Keep ice wrap in place except when walking; changing gel packs every 4 hours   Lie down and elevate your leg on pillows for about 30 minutes after walking to decrease swelling and pain   Do ankle pumps (10 repetitions) every hour while awake and get up frequently to walk   Take narcotic pain pill as prescribed if needed. Take with food; avoid alcohol while taking pain medication. Decrease the amount of narcotic pain medication as your pain lessens   Do not take any over-the-counter medication except for Tylenol (acetaminophen). Please be aware that many medications contain Tylenol. We do not want you to take too much Tylenol so please use this as your guide:  o Do not take more than 3 Grams of Tylenol in a 24 hour period.  (There are 1000 milligrams in one Gram  o 325mg of Tylenol per tablet (do not take more than 9 tablets in 24 hours)  o 500mg of Tylenol per tablet (do not take more than 6 tablets in 24 hours)    Diet   Resume usual diet; drink plenty of fluids; eat foods high in fiber   Take over-the-counter stool softeners and laxative to prevent constipation

## 2019-07-25 NOTE — PROGRESS NOTES
PHYSICAL THERAPY    Patient is cleared for discharge from PT standpoint. He will benefit from [address] [facility] PT in order to achieve maximum level of safe, functional mobility, balance and return to independent PLOF. Full note to follow.     Scotty Porter

## 2019-07-25 NOTE — ROUTINE PROCESS
Bedside shift change report given to Radha Campos (oncoming nurse) by Jah Fma (offgoing nurse). Report included the following information SBAR.

## 2019-07-25 NOTE — PROGRESS NOTES
Bedside and Verbal shift change report given to Elmer Ashford RN (oncoming nurse) by Gus New RN (offgoing nurse). Report included the following information SBAR, Kardex, OR Summary, Procedure Summary, Intake/Output, MAR, Accordion and Recent Results.

## 2019-07-25 NOTE — PROGRESS NOTES
Complaints: none  Events: none    VSS / AFEBRILE      GEN:  NAD. AOx3   ABD:  S/NT/ND   LLE:  Dressing C/D/I    5/5 motor    Calf nttp (Bilat)    Sensate all distribution to light touch    1+ dp/pt pulses, foot perfused      Lab Results   Component Value Date/Time    HGB 13.2 07/25/2019 02:39 AM    INR 1.1 07/16/2019 09:25 AM       Lab Results   Component Value Date/Time    Sodium 142 07/25/2019 02:39 AM    Potassium 3.7 07/25/2019 02:39 AM    Chloride 110 (H) 07/25/2019 02:39 AM    CO2 23 07/25/2019 02:39 AM    BUN 28 (H) 07/25/2019 02:39 AM    Creatinine 1.75 (H) 07/25/2019 02:39 AM    Calcium 8.1 (L) 07/25/2019 02:39 AM            POD #1 LEFT TOTAL HIP REPLACEMENT. Satisfactory progress. Patient is doing well. Pain is currently well-controlled. Plan for discharge to home today if PT goals are met. ABX: Complete today  PATHWAY: D/C [facility] per protocol  DVT Prophylaxis: Eliquis   Weight Bearing: WBAT LLE   Pain Control: PRN oral narcotics, celebrex  Anticipated Discharge Date: today  Disposition: Home, PT.

## 2019-07-25 NOTE — PROGRESS NOTES
Chart Reviewed and patient evaluated by OT. Demonstrated safety with ADLs and functional mobility. No further acute care OT needs, safe to DC home with family support.     Full note to follow    Thank you  Clarita Gallegos MS OTR/L

## 2019-07-25 NOTE — PROGRESS NOTES
DOMINGA: home with home health. [facility] has accepted patient. Care Management Interventions  PCP Verified by CM: Yes  Mode of Transport at Discharge: Other (see comment)(family)  Transition of Care Consult (CM Consult): Home Health, Discharge Planning  MyChart Signup: No  Discharge Durable Medical Equipment: No  Physical Therapy Consult: Yes  Occupational Therapy Consult: Yes  Speech Therapy Consult: No  Current Support Network: Lives with Spouse, Own Home  Confirm Follow Up Transport: Family  Plan discussed with Pt/Family/Caregiver: Yes  Freedom of Choice Offered: Yes  Discharge Location  Discharge Placement: Home with home health     Reason for Admission: LEFT TOTAL HIP REPLACEMENT                   RRAT Score:   2                 Plan for utilizing home health: pending agency choice. Patient stated he does not want to use [facility] and he is waiting to hear back from his friend to find out which agency she used before. Current Advanced Directive/Advance Care Plan: not on file                         Transition of Care Plan:    Home with home health. CM revisited patient to obtain agency choice. Patient now stating he prefers [facility]. [facility] Millan Final requesting Heidi Dove, PT. CM sent referral via [employer].     Kory Stoddard, BSW/CRM

## 2019-07-25 NOTE — PROGRESS NOTES
Patient assessed for readiness to ambulate. Vital Signs  Level of Consciousness: Alert  Temp: 98.4 °F (36.9 °C)  Temp Source: Axillary  Pulse (Heart Rate): 66  Heart Rate Source: Monitor  Resp Rate: 16  BP: 126/76  MAP (Monitor): 95  MAP (Calculated): 93  BP 1 Location: Right arm  BP 1 Method: Automatic  BP Patient Position: At rest  MEWS Score: 1. Patient ambulated with assistance of 2 nurses. Patient ambulated with gait belt and walker. Patient walked to sidestepped to Head of Bed. Patient returned safely to bed.

## 2019-07-25 NOTE — PROGRESS NOTES
Bedside shift change report [facility] by Eliot Solano (offgoing nurse). Report included the following information SBAR.

## 2019-07-25 NOTE — PROGRESS NOTES
Problem: Falls - Risk of  Goal: *Absence of Falls  Description  Document Pratik Ophelia Fall Risk and appropriate interventions in the flowsheet.   Outcome: Progressing Towards Goal  Note:   Fall Risk Interventions:  Mobility Interventions: Patient to call before getting OOB, Utilize walker, cane, or other assistive device         Medication Interventions: Evaluate medications/consider consulting pharmacy, Patient to call before getting OOB, Teach patient to arise slowly    Elimination Interventions: Call light in reach, Patient to call for help with toileting needs, Urinal in reach              Problem: Hip Replacement: Post Op Day 1  Goal: *Hemodynamically stable  Outcome: Progressing Towards Goal

## 2019-07-25 NOTE — PROGRESS NOTES
OCCUPATIONAL THERAPY EVALUATION WITH DISCHARGE: Knee  Patient: Gayle Dodge (75 y.o. male)  Date: 7/25/2019  Primary Diagnosis: Osteoarthritis of left hip [M16.12]  Procedure(s) (LRB):  LEFT TOTAL HIP ARTHROPLASTY ANTERIOR APPROACH (Left) 1 Day Post-Op   Precautions: fall       ASSESSMENT :  Based on the objective data described below, patient presents with Setup upper body ADLs, Setup lower body ADLs, and Modified independent functional mobility. AAOx4, observed getting dressed EOB with PT and ambulating safety with crutches around the room and short functional distance. The patient expressed good support from girlfriend and son at home. Has all needed DME to include shower chair in a walk-in shower. No further acute care OT needs, recalls safety instructions from [facility] in 2017. Patient safe to dc once medically stable. The following are barriers to ADL independence while in acute care:   - Cognitive and/or behavioral: none  - Medical condition: functional endurance    - Other:       Discharge recommendations: None     Equipment recommendations for successful discharge (if) home: has all needed at home     PLAN :  Further skilled acute occupational therapy services are not indicated at this time. SUBJECTIVE:   Patient stated Oh I think that I will be good when I get home.     OBJECTIVE DATA SUMMARY:   HISTORY:   Past Medical History:   Diagnosis Date    Arrhythmia     AFIB    Arthritis     Atrial fibrillation ([facility] [address] 2017    Chronic pain     RIGHT HIP    High cholesterol     Hypertension     Nausea & vomiting     Pulmonary embolism ([facility] [address] 1999     Past Surgical History:   Procedure Laterality Date    HX ADENOIDECTOMY  1960    HX HEENT  1989    SEPTOPLASTY    HX SHOULDER ARTHROSCOPY Left 2018    HX TONSILLECTOMY  1960    HX VASECTOMY  2012    TOTAL HIP ARTHROPLASTY Right 2017       Prior Level of Function/Environment/Context: Independent at home, has equipment from prior admission.  Has support at home via girlfriend and son who plans on coming to assist.     Expanded or extensive additional review of patient history:     Home Situation  Home Environment: Private residence  # Steps to Enter: 3  One/Two Story Residence: Two story, live on 1st floor  # of Interior Steps: 14  Lift Chair Available: No  Living Alone: No  Support Systems: Spouse/Significant Other/Partner  Patient Expects to be Discharged to[de-identified] Private residence  Current DME Used/Available at Home: Cane, straight    Hand dominance: Right    EXAMINATION OF PERFORMANCE DEFICITS:  Cognitive/Behavioral Status:  Neurologic State: Alert  Orientation Level: Appropriate for age  Cognition: Appropriate decision making; Appropriate safety awareness  Perception: Appears intact  Perseveration: No perseveration noted  Safety/Judgement: Awareness of environment    Skin: all visible areas intact    Edema: none noted    Hearing: Auditory  Auditory Impairment: None    Vision/Perceptual:                           Acuity: Within Defined Limits    Corrective Lenses: Glasses    Range of Motion:    AROM: Within functional limits  PROM: Within functional limits                      Strength:    Strength: Within functional limits                Coordination:  Coordination: Within functional limits  Fine Motor Skills-Upper: Left Intact; Right Intact    Gross Motor Skills-Upper: Left Intact; Right Intact    Tone & Sensation:    Tone: Normal  Sensation: Intact                      Balance:  Sitting: Intact  Standing: Intact; With support    Functional Mobility and Transfers for ADLs:  Bed Mobility:       Transfers:  Sit to Stand: Supervision  Stand to Sit: Supervision  Bed to Chair: Supervision  Toilet Transfer : Modified independent  Assistive Device : Cruthes    ADL Assessment:  Feeding: Independent    Oral Facial Hygiene/Grooming: Modified Independent    Bathing: Supervision    Upper Body Dressing: Setup    Lower Body Dressing: Setup    Toileting: Modified independent ADL Intervention and task modifications:                                     Cognitive Retraining  Safety/Judgement: Awareness of environment    Bathing: Patient instructed and indicated understanding when bathing to not submerge wound in water, stand to shower or sponge bathe, cover wound with plastic and tape to ensure no water reaches bandage/wound without cues. Dressing joint: Patient instructed and demonstrated understanding to don/doff Left LE first/last with Modified independent. Patient instructed and demonstrated to don all clothing while sitting prior to standing, doff all clothing to knees while standing, then sit to doff clothing off from knees to feet in order to facilitate fall prevention, pain management, and energy conservation with Modified independent. Home safety: Patient instructed and indicated understanding on home modifications and safety (raise height of ADL objects, appropriate height of chair surfaces, recliner safety, change of floor surfaces, clear pathways) to increase independence and fall prevention. Standing: Patient instructed and demonstrated during ADLs to walk up to sink/counter top/surfaces, step into walker to increase safety of joint and fall prevention with Modified independent. Patient educated about knee anatomy and educated to avoid rotation of Left LE. Instructed to apply concept to ADLs within the home (no twisting of knee during reaching across body, square off while using objects, slide objects along surfaces). Patient instructed and indicated understanding to increase amount of time standing, observe standing position during ADLs in order to increase even weight bearing through bilateral LEs in order to increase independence with ADLs. Goal to be reached 30 days post - op, per orthopedic surgeon or per PT.     Tub/shower transfer: Patient instructed and indicated understanding regarding when it is safe to begin transfer into tub/shower (complete stairs with PT, advance exercises with PT high enough to clear tub/shower height). Patient instructed to use the same technique as used with stairs when entering and exiting tub/shower (\"up with the non-surgical, down with the surgical leg\"). Functional Measure:  Barthel Index:    Bathin  Bladder: 10  Bowels: 10  Groomin  Dressing: 10  Feeding: 10  Mobility: 15  Stairs: 5  Toilet Use: 10  Transfer (Bed to Chair and Back): 10  Total: 90/100        Percentage of impairment   0%   1-19%   20-39%   40-59%   60-79%   80-99%   100%   Barthel Score 0-100 100 99-80 79-60 59-40 20-39 1-19   0     The Barthel ADL Index: Guidelines  1. The index should be used as a record of what a patient does, not as a record of what a patient could do. 2. The main aim is to establish degree of independence from any help, physical or verbal, however minor and for whatever reason. 3. The need for supervision renders the patient not independent. 4. A patient's performance should be established using the best available evidence. Asking the patient, friends/relatives and nurses are the usual sources, but direct observation and common sense are also important. However direct testing is not needed. 5. Usually the patient's performance over the preceding 24-48 hours is important, but occasionally longer periods will be relevant. 6. Middle categories imply that the patient supplies over 50 per cent of the effort. 7. Use of aids to be independent is allowed. Angela Norris., Barthel, D.W. ([id number]). Functional evaluation: the Barthel Index. [facility] (14)2. Manuel Moseley joe ERIC Oliveira, Adore Cannon., Christina Rodriguez., Kevyn, [address] (). Measuring the change indisability after inpatient rehabilitation; comparison of the responsiveness of the Barthel Index and Functional Hudson Measure. Journal of Neurology, Neurosurgery, and Psychiatry, 66(4), 406-797.   ALEX Gibbons, ELIANE Das, Stacie Birmingham, M.A. (2004.) Assessment of post-stroke quality of life in cost-effectiveness studies: The usefulness of the Barthel Index and the EuroQoL-5D. Quality of Life Research, 15, 257-89       Occupational Therapy Evaluation Charge Determination   History Examination Decision-Making   LOW Complexity : Brief history review  LOW Complexity : 1-3 performance deficits relating to physical, cognitive , or psychosocial skils that result in activity limitations and / or participation restrictions  LOW Complexity : No comorbidities that affect functional and no verbal or physical assistance needed to complete eval tasks       Based on the above components, the patient evaluation is determined to be of the following complexity level: LOW   Pain:  No pain reported     Activity Tolerance:   good  Please refer to the flowsheet for vital signs taken during this treatment.     After treatment patient left:   with physical therapy   COMMUNICATION/EDUCATION:   The patients evaluation was discussed with: Physical Therapist.    Thank you for this referral.  Malou Samayoa  Time Calculation: 10 mins

## 2019-08-01 NOTE — DISCHARGE SUMMARY
[address] [address]    DISCHARGE SUMMARY     Patient: Harry Gonzalez                             Medical Record Number: [id number]                : 1955  Age: 61 y.o. Admit Date: 2019  Discharge Date: 2019  Admission Diagnosis: Osteoarthritis of left hip [M16.12]  Discharge Diagnosis: OSTEOARTHRITIS LEFT HIP  Procedures: Procedure(s):  LEFT TOTAL HIP ARTHROPLASTY ANTERIOR APPROACH  Surgeon: Anupama Garcia  Assistants: Lianet  Anesthesia: general  Complications: None     History of Present Illness:  Harry Gonzalez is a 61 y.o. male with a history of Left hip pain, swelling, and marked loss of function. Despite conservative management and after clinical and radiographic evaluation, it was determined that he suffered from end-stage osteoarthritis and would benefit from Procedure(s):  LEFT TOTAL HIP ARTHROPLASTY ANTERIOR APPROACH, which he consented to undergo after a discussion of the risks, benefits, alternatives, rehab concerns, and potential complications of surgery. Hospital Course: Harry Gonzalez tolerated the procedure well. He was transferred  to the recovery room in stable condition. After a brief stay the patient was then transferred to the Joint Replacement Unit at [facility].  On postoperative day #1, the dressing was clean and dry, he was neurovascularly intact. The patient was afebrile and vital signs were stable. Calves were soft and non-tender bilaterally. On postoperative day  # 1, the patient was tolerating a regular diet and making satisfactory progress with physical therapy. Hemoglobin and INR prior to discharge were   Lab Results   Component Value Date/Time    HGB 13.2 2019 02:39 AM    INR 1.1 2019 09:25 AM   .  Harry Gonzalez made satisfactory progress with physical therapy and was discharged to Home in stable condition on postoperative day 1.   He was provided with routine postoperative instructions and advised to follow up in my office in 3 weeks following discharge from the hospital.  He was prescribed Eliquis for DVT prophylaxis and oxycodone for post-operative pain. Discharge Medications:  Cannot display discharge medications since this patient is not currently admitted. Signed by: Carmelo Edmondson MD  8/1/2019           .

## 2019-12-18 ENCOUNTER — CLINICAL SUPPORT (OUTPATIENT)
Dept: CARDIOLOGY CLINIC | Age: 64
End: 2019-12-18

## 2019-12-18 ENCOUNTER — OFFICE VISIT (OUTPATIENT)
Dept: CARDIOLOGY CLINIC | Age: 64
End: 2019-12-18

## 2019-12-18 ENCOUNTER — TELEPHONE (OUTPATIENT)
Dept: CARDIOLOGY CLINIC | Age: 64
End: 2019-12-18

## 2019-12-18 VITALS
HEIGHT: 69 IN | RESPIRATION RATE: 20 BRPM | OXYGEN SATURATION: 97 % | WEIGHT: 193 LBS | DIASTOLIC BLOOD PRESSURE: 82 MMHG | HEART RATE: 60 BPM | SYSTOLIC BLOOD PRESSURE: 140 MMHG | BODY MASS INDEX: 28.58 KG/M2

## 2019-12-18 DIAGNOSIS — I48.0 PAROXYSMAL ATRIAL FIBRILLATION (HCC): Primary | ICD-10-CM

## 2019-12-18 DIAGNOSIS — I10 HTN (HYPERTENSION), BENIGN: ICD-10-CM

## 2019-12-18 DIAGNOSIS — I48.91 ATRIAL FIBRILLATION, UNSPECIFIED TYPE (HCC): Primary | ICD-10-CM

## 2019-12-18 RX ORDER — POTASSIUM CHLORIDE 750 MG/1
TABLET, FILM COATED, EXTENDED RELEASE ORAL
COMMUNITY

## 2019-12-18 RX ORDER — LANOLIN ALCOHOL/MO/W.PET/CERES
400 CREAM (GRAM) TOPICAL DAILY
Qty: 30 TAB | Refills: 3 | Status: SHIPPED | OUTPATIENT
Start: 2019-12-18 | End: 2020-03-30

## 2019-12-18 RX ORDER — FLECAINIDE ACETATE 100 MG/1
200 TABLET ORAL AS NEEDED
Qty: 10 TAB | Refills: 1 | Status: SHIPPED | OUTPATIENT
Start: 2019-12-18 | End: 2020-01-31

## 2019-12-18 NOTE — PATIENT INSTRUCTIONS
-Start Magox 400mg daily  -When you have an episode of AFib, hit the button on your monitor and take 2 tablets of Flecainide at that time.   -Check out [email or web address]

## 2019-12-18 NOTE — LETTER
12/27/19 Patient: Brianne Chery YOB: 1955 Date of Visit: 12/18/2019 Stephane Yanez MD 
[address] [address] VIA Facsimile: [phone] Dear Stephane Yanez MD, Thank you for referring Mr. Brianne Chery to [address]  for evaluation. My notes for this consultation are attached. If you have questions, please do not hesitate to call me. I look forward to following your patient along with you. Sincerely, Baldomero Finney MD

## 2019-12-18 NOTE — PROGRESS NOTES
Brionna Kim MD [facility] [place]  [address]  [place], [address] [facility]    Office [phone]  Fax [phone]  Cell [phone]        Donnell Calvillo is a 59 y.o. male Last seen by me 6 months ago. Assessment  Encounter Diagnoses   Name Primary?  Paroxysmal atrial fibrillation (HCC) Yes    HTN (hypertension), benign        Recommendations:    PAF. Marked increase frequency of AF sxs over the past few months, particularly at night but sometimes with exercise. Rare episode of chest tightness with tachycardia, but no exertional sxs otherwise. Favor aggressive rhythm control strategy. Substrate = HTN. ETT 2017 normal. Echo at that time with EF 60%, mild LVH, mild LAE. - 30 day loop recorder.   - Pill in the pocket strategy with Flecainide 200mg PRN  - add MgOx 400 mg/d   - Continue Toprol XL 200mg/d  - Continue Eliquis 5mg BID  - Based on his AF burden, will decide on scheduled Flecainide vs referral for AF ablation. HTN, controlled on combination therapy. Phone follow up after reviewing tests             Subjective: Donnell Calvillo reports he had left THR in July, feeling well now. He reports he is having more AF recently, walking him up from sleep - lasting up to 2 hours. He has been taking potassium supplements, which have slowed down the sxs. He says he will have an Afib spell if he forgets to take his Metoprolol. He notes the past two spells have had accompanied chest pain. Patient denies any exertional chest pain, dyspnea, syncope, orthopnea, edema or paroxysmal nocturnal dyspnea. He was in the ER a few months ago with AF sxs, but he was in [employer] when he arrived. Subsequently supplemented with potassium. He recently started a keto diet. He drinks 2 beers daily. 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 Cardiac risk factors   HTN no  DM no  Smoking no  Family hx of CAD yes    Cardiac testing  ETT 8/30/17 - 10:00. PMHR 169 (105%).  Rest BP 180-104. Stress /120. No angina with exercise. Non-ischemic EKG with stress. Echo 8/30/17 - Ef 60-65%. No WMA. Mild LVH. Mild LAE. Small atrial septal aneurysm. Mild MR.  24 hour Holter monitor 9/11/17 - SR . Frequent PAC's. Non-sustained atrial tach vs. Afib with RVR. Past Medical History:   Diagnosis Date    Arrhythmia     AFIB    Arthritis     Atrial fibrillation ([address] 2017    Chronic pain     RIGHT HIP    High cholesterol     Hypertension     Nausea & vomiting     Pulmonary embolism (HCC) 1999        Current Outpatient Medications   Medication Sig Dispense Refill    potassium chloride SR (KLOR-CON 10) 10 mEq tablet Take  by mouth.  magnesium oxide (MAG-OX) 400 mg tablet Take 1 Tab by mouth daily. 30 Tab 3    flecainide (TAMBOCOR) 100 mg tablet Take 2 Tabs by mouth as needed (episodes of Afib). Indications: Paroxysmal Atrial Fibrillation 10 Tab 1    tamsulosin (FLOMAX) 0.4 mg capsule Take 0.4 mg by mouth daily.  ELIQUIS 5 mg tablet TAKE 1 TABLET BY MOUTH TWO TIMES DAILY 60 Tab 3    amLODIPine (NORVASC) 10 mg tablet Take 10 mg by mouth daily (after breakfast). 3    metoprolol succinate (TOPROL-XL) 100 mg tablet Take 1 Tab by mouth nightly. (Patient taking differently: Take 100 mg by mouth daily (after breakfast). ) 30 Tab 5    lisinopril (PRINIVIL, ZESTRIL) 20 mg tablet Take 1 Tab by mouth daily. 30 Tab 5    b complex vitamins tablet Take 1 Tab by mouth daily. 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 Review of Systems  Constitutional: Negative for fever, chills and diaphoresis. Respiratory: Negative for cough, hemoptysis, sputum production, shortness of breath and wheezing. Cardiovascular: Negative for orthopnea, claudication and PND. +chest pain, palpitations  Gastrointestinal: Negative for heartburn, nausea, vomiting, blood in stool and melena.   Genitourinary: Negative for dysuria and flank pain.  Musculoskeletal: Negative for joint pain and back pain. Skin: Negative for rash. Neurological: Negative for focal weakness, seizures, loss of consciousness, weakness and headaches. Endo/Heme/Allergies: Does not bruise/bleed easily. Psychiatric/Behavioral: Negative for memory loss. The patient does not have insomnia. Physical Exam    Visit Vitals  /82   Pulse 60   Resp 20   Ht 5' 9\" (1.753 m)   Wt 193 lb (87.5 kg)   SpO2 97%   BMI 28.50 kg/m²     Wt Readings from Last 3 Encounters:   12/18/19 193 lb (87.5 kg)   07/24/19 207 lb (93.9 kg)   07/16/19 207 lb (93.9 kg)      General - well developed well nourished  Neck - JVP normal, thyroid nl  Cardiac - normal S1, S2, no murmurs, rubs or gallops. No clicks  Vascular - carotids without bruits, radials, femorals and pedal pulses equal bilateral  Lungs - clear to auscultation bilaterals, no rales, wheezing or rhonchi  Abd - soft nontender, no HSM, no abd bruits  Extremities - no edema  Skin - no rash  Neuro - nonfocal  Psych - normal mood and affect      Cardiographics  EKG 8/14/17- SR, lateral T wave inversions, no previous EKG. EKG 5/29/18- SR 59, nonspecific lateral T wave inversions. No significant change from 8/14/17. Written by Kathe Shin, as dictated by Baldomero Finney M.D.      Baldomero Finney MD

## 2019-12-18 NOTE — PROGRESS NOTES
Visit Vitals  /82   Pulse 60   Resp 20   Ht 5' 9\" (1.753 m)   Wt 193 lb (87.5 kg)   SpO2 97%   BMI 28.50 kg/m²     Keto diet. Complains of CP with afib. Denies SOB with SR  Afib last week.

## 2019-12-18 NOTE — TELEPHONE ENCOUNTER
----- Message from Aleena Lyman MD sent at 2019 10:38 AM EST -----  Regardin day loop recorder  Dx paroxysmal atrial fibrillation

## 2020-01-06 ENCOUNTER — DOCUMENTATION ONLY (OUTPATIENT)
Dept: CARDIOLOGY CLINIC | Age: 65
End: 2020-01-06

## 2020-01-07 ENCOUNTER — DOCUMENTATION ONLY (OUTPATIENT)
Dept: CARDIOLOGY CLINIC | Age: 65
End: 2020-01-07

## 2020-01-07 NOTE — PROGRESS NOTES
Received call from [address] [place] [address] for a serious notification on pt's event monitor. First documentation A Flutter at 120 bpm. Notified Raymond Medley.

## 2020-01-07 NOTE — PROGRESS NOTES
Received serious loop alert      1/7/20   12:22pm   Auto Trigger   Atrial Flutter w/Variable Conduction rates up to 120 bpm    **First Document Atrial Flutter on loop      Informed Dr Sage Morales, NP

## 2020-01-31 RX ORDER — FLECAINIDE ACETATE 100 MG/1
TABLET ORAL
Qty: 10 TAB | Refills: 1 | Status: SHIPPED | OUTPATIENT
Start: 2020-01-31 | End: 2020-02-11 | Stop reason: DRUGHIGH

## 2020-02-11 ENCOUNTER — TELEPHONE (OUTPATIENT)
Dept: CARDIOLOGY CLINIC | Age: 65
End: 2020-02-11

## 2020-02-11 RX ORDER — FLECAINIDE ACETATE 50 MG/1
50 TABLET ORAL 2 TIMES DAILY
Qty: 60 TAB | Refills: 2 | Status: SHIPPED | OUTPATIENT
Start: 2020-02-11 | End: 2020-02-11 | Stop reason: SDUPTHER

## 2020-02-11 NOTE — TELEPHONE ENCOUNTER
----- Message from Horace Serrano NP sent at 2/11/2020 11:08 AM EST -----  Cardiac pmetllh88 day Holter 1/5/20- 2/3/20 - 3% AF burden. AF max 's. SB to ST rates 59 to 149. Please notify Mr. Roro Peter that Dr. Irina Maciel recommends that he begin taking Flecainide 50 mg twice daily. Return to clinic in 3 months.

## 2020-03-30 RX ORDER — LANOLIN ALCOHOL/MO/W.PET/CERES
CREAM (GRAM) TOPICAL
Qty: 90 TAB | Refills: 1 | Status: SHIPPED | OUTPATIENT
Start: 2020-03-30 | End: 2020-09-23

## 2020-03-30 NOTE — TELEPHONE ENCOUNTER
Requested Prescriptions     Signed Prescriptions Disp Refills    magnesium oxide (MAG-OX) 400 mg tablet 90 Tab 1     Sig: TAKE 1 TABLET BY MOUTH EVERY DAY     Authorizing Provider: [facility]     Ordering User: OZZY HARPER

## 2020-05-07 ENCOUNTER — APPOINTMENT (OUTPATIENT)
Dept: GENERAL RADIOLOGY | Age: 65
End: 2020-05-07
Attending: PHYSICIAN ASSISTANT
Payer: COMMERCIAL

## 2020-05-07 ENCOUNTER — HOSPITAL ENCOUNTER (EMERGENCY)
Age: 65
Discharge: HOME | End: 2020-05-07
Attending: EMERGENCY MEDICINE
Payer: COMMERCIAL

## 2020-05-07 ENCOUNTER — APPOINTMENT (OUTPATIENT)
Dept: CT IMAGING | Age: 65
End: 2020-05-07
Attending: PHYSICIAN ASSISTANT
Payer: COMMERCIAL

## 2020-05-07 VITALS
BODY MASS INDEX: 28.5 KG/M2 | DIASTOLIC BLOOD PRESSURE: 87 MMHG | HEART RATE: 77 BPM | SYSTOLIC BLOOD PRESSURE: 134 MMHG | WEIGHT: 193 LBS | OXYGEN SATURATION: 95 % | RESPIRATION RATE: 16 BRPM | TEMPERATURE: 98.8 F

## 2020-05-07 DIAGNOSIS — K76.0 HEPATIC STEATOSIS: ICD-10-CM

## 2020-05-07 DIAGNOSIS — R31.9 HEMATURIA, UNSPECIFIED TYPE: Primary | ICD-10-CM

## 2020-05-07 DIAGNOSIS — R10.9 ACUTE RIGHT FLANK PAIN: ICD-10-CM

## 2020-05-07 LAB
ALBUMIN SERPL-MCNC: 2.9 G/DL (ref 3.5–5)
ALBUMIN/GLOB SERPL: 0.9 {RATIO} (ref 1.1–2.2)
ALP SERPL-CCNC: 59 U/L (ref 45–117)
ALT SERPL-CCNC: 72 U/L (ref 12–78)
ANION GAP SERPL CALC-SCNC: 5 MMOL/L (ref 5–15)
APPEARANCE UR: CLEAR
AST SERPL-CCNC: 34 U/L (ref 15–37)
BACTERIA URNS QL MICRO: NEGATIVE /HPF
BASOPHILS # BLD: 0.1 K/UL (ref 0–0.1)
BASOPHILS NFR BLD: 0 % (ref 0–1)
BILIRUB SERPL-MCNC: 0.3 MG/DL (ref 0.2–1)
BILIRUB UR QL: NEGATIVE
BUN SERPL-MCNC: 25 MG/DL (ref 6–20)
BUN/CREAT SERPL: 18 (ref 12–20)
CALCIUM SERPL-MCNC: 9.4 MG/DL (ref 8.5–10.1)
CHLORIDE SERPL-SCNC: 114 MMOL/L (ref 97–108)
CO2 SERPL-SCNC: 27 MMOL/L (ref 21–32)
COLOR UR: ABNORMAL
CREAT SERPL-MCNC: 1.36 MG/DL (ref 0.7–1.3)
DIFFERENTIAL METHOD BLD: ABNORMAL
EOSINOPHIL # BLD: 0 K/UL (ref 0–0.4)
EOSINOPHIL NFR BLD: 0 % (ref 0–7)
EPITH CASTS URNS QL MICRO: ABNORMAL /LPF
ERYTHROCYTE [DISTWIDTH] IN BLOOD BY AUTOMATED COUNT: 17.2 % (ref 11.5–14.5)
GLOBULIN SER CALC-MCNC: 3.3 G/DL (ref 2–4)
GLUCOSE SERPL-MCNC: 112 MG/DL (ref 65–100)
GLUCOSE UR STRIP.AUTO-MCNC: NEGATIVE MG/DL
HCT VFR BLD AUTO: 48 % (ref 36.6–50.3)
HGB BLD-MCNC: 16.1 G/DL (ref 12.1–17)
HGB UR QL STRIP: ABNORMAL
HYALINE CASTS URNS QL MICRO: ABNORMAL /LPF (ref 0–5)
IMM GRANULOCYTES # BLD AUTO: 0.3 K/UL (ref 0–0.04)
IMM GRANULOCYTES NFR BLD AUTO: 2 % (ref 0–0.5)
KETONES UR QL STRIP.AUTO: NEGATIVE MG/DL
LEUKOCYTE ESTERASE UR QL STRIP.AUTO: NEGATIVE
LIPASE SERPL-CCNC: 118 U/L (ref 73–393)
LYMPHOCYTES # BLD: 1.7 K/UL (ref 0.8–3.5)
LYMPHOCYTES NFR BLD: 11 % (ref 12–49)
MCH RBC QN AUTO: 31.6 PG (ref 26–34)
MCHC RBC AUTO-ENTMCNC: 33.5 G/DL (ref 30–36.5)
MCV RBC AUTO: 94.1 FL (ref 80–99)
MONOCYTES # BLD: 1.3 K/UL (ref 0–1)
MONOCYTES NFR BLD: 9 % (ref 5–13)
NEUTS SEG # BLD: 11.8 K/UL (ref 1.8–8)
NEUTS SEG NFR BLD: 78 % (ref 32–75)
NITRITE UR QL STRIP.AUTO: NEGATIVE
NRBC # BLD: 0.03 K/UL (ref 0–0.01)
NRBC BLD-RTO: 0.2 PER 100 WBC
PH UR STRIP: 6 [PH] (ref 5–8)
PLATELET # BLD AUTO: 165 K/UL (ref 150–400)
PMV BLD AUTO: 11 FL (ref 8.9–12.9)
POTASSIUM SERPL-SCNC: 3.5 MMOL/L (ref 3.5–5.1)
PROT SERPL-MCNC: 6.2 G/DL (ref 6.4–8.2)
PROT UR STRIP-MCNC: NEGATIVE MG/DL
RBC # BLD AUTO: 5.1 M/UL (ref 4.1–5.7)
RBC #/AREA URNS HPF: ABNORMAL /HPF (ref 0–5)
SODIUM SERPL-SCNC: 146 MMOL/L (ref 136–145)
SP GR UR REFRACTOMETRY: 1.02 (ref 1–1.03)
UR CULT HOLD, URHOLD: NORMAL
UROBILINOGEN UR QL STRIP.AUTO: 0.2 EU/DL (ref 0.2–1)
WBC # BLD AUTO: 15.3 K/UL (ref 4.1–11.1)
WBC URNS QL MICRO: ABNORMAL /HPF (ref 0–4)

## 2020-05-07 PROCEDURE — 74176 CT ABD & PELVIS W/O CONTRAST: CPT

## 2020-05-07 PROCEDURE — 74011250636 HC RX REV CODE- 250/636: Performed by: PHYSICIAN ASSISTANT

## 2020-05-07 PROCEDURE — 36415 COLL VENOUS BLD VENIPUNCTURE: CPT

## 2020-05-07 PROCEDURE — 83690 ASSAY OF LIPASE: CPT

## 2020-05-07 PROCEDURE — 71046 X-RAY EXAM CHEST 2 VIEWS: CPT

## 2020-05-07 PROCEDURE — 99284 EMERGENCY DEPT VISIT MOD MDM: CPT

## 2020-05-07 PROCEDURE — 81001 URINALYSIS AUTO W/SCOPE: CPT

## 2020-05-07 PROCEDURE — 85025 COMPLETE CBC W/AUTO DIFF WBC: CPT

## 2020-05-07 PROCEDURE — 96374 THER/PROPH/DIAG INJ IV PUSH: CPT

## 2020-05-07 PROCEDURE — 80053 COMPREHEN METABOLIC PANEL: CPT

## 2020-05-07 RX ORDER — HYDROCODONE BITARTRATE AND ACETAMINOPHEN 5; 325 MG/1; MG/1
1 TABLET ORAL
Qty: 8 TAB | Refills: 0 | Status: SHIPPED | OUTPATIENT
Start: 2020-05-07 | End: 2020-05-09

## 2020-05-07 RX ORDER — FENTANYL CITRATE 50 UG/ML
50 INJECTION, SOLUTION INTRAMUSCULAR; INTRAVENOUS
Status: COMPLETED | OUTPATIENT
Start: 2020-05-07 | End: 2020-05-07

## 2020-05-07 RX ADMIN — FENTANYL CITRATE 50 MCG: 50 INJECTION INTRAMUSCULAR; INTRAVENOUS at 11:41

## 2020-05-07 RX ADMIN — SODIUM CHLORIDE 1000 ML: 900 INJECTION, SOLUTION INTRAVENOUS at 11:41

## 2020-05-07 NOTE — LETTER
[address] 
[facility] EMERGENCY DEPT 
[address] [facility] [address] [phone] Work/School Note Date: 5/7/2020 To Whom It May concern: 
 
Jimmy Talley was seen and treated today in the emergency room by the following provider(s): 
Attending Provider: Isaías Ross MD 
Physician Assistant: Lowell Urias PA-C. Jimmy Talley may return to work on 5/11/2020. Sincerely, Tate Gerardo PA-C

## 2020-05-07 NOTE — DISCHARGE INSTRUCTIONS
Patient Education        Flank Pain: Care Instructions  Your Care Instructions  Flank pain is pain on the side of the back just below the rib cage and above the waist. It can be on one or both sides. Flank pain has many possible causes, including a kidney stone, a urinary tract infection, or back strain. Flank pain may get better on its own. But don't ignore new symptoms, such as fever, nausea and vomiting, urination problems, pain that gets worse, and dizziness. These may be signs of a more serious problem. You may have to have tests or other treatment. Follow-up care is a key part of your treatment and safety. Be sure to make and go to all appointments, and call your doctor if you are having problems. It's also a good idea to know your test results and keep a list of the medicines you take. How can you care for yourself at home? · Rest until you feel better. · Take pain medicines exactly as directed. ? If the doctor gave you a prescription medicine for pain, take it as prescribed. ? If you are not taking a prescription pain medicine, ask your doctor if you can take an over-the-counter pain medicine, such as acetaminophen (Tylenol), ibuprofen (Advil, Motrin), or naproxen (Aleve). Read and follow all instructions on the label. · If your doctor prescribed antibiotics, take them as directed. Do not stop taking them just because you feel better. You need to take the full course of antibiotics. · To apply heat, put a warm water bottle, a heating pad set on low, or a warm cloth on the painful area. Do not go to sleep with a heating pad on your skin. · To prevent dehydration, drink plenty of fluids, enough so that your urine is light yellow or clear like water. Choose water and other caffeine-free clear liquids until you feel better. If you have kidney, heart, or liver disease and have to limit fluids, talk with your doctor before you increase the amount of fluids you drink. When should you call for help?   Call your doctor now or seek immediate medical care if:    · Your flank pain gets worse.     · You have new symptoms, such as fever, nausea, or vomiting.     · You have symptoms of a urinary problem. For example:  ? You have blood or pus in your urine. ? You have chills or body aches. ? It hurts to urinate. ? You have groin or belly pain.    Watch closely for changes in your health, and be sure to contact your doctor if you do not get better as expected. Where can you learn more? Go to http://lizbeth-yang.info/  Enter S191 in the search box to learn more about \"Flank Pain: Care Instructions. \"  Current as of: June 26, 2019Content Version: 12.4  © 0504-0974 [employer]. Care instructions adapted under license by [employer] (which disclaims liability or warranty for this information). If you have questions about a medical condition or this instruction, always ask your healthcare professional. [place] [address] any warranty or liability for your use of this information.

## 2020-05-07 NOTE — ED PROVIDER NOTES
58 y/o male with pmhx of afib, HLD, HTN, PE, polymyaligia rheumatica, nephrolithiasis presents with right flank pain x 12 hours. Pain is 4/10 when sitting still and 9/10 with twisting movement. Pt can replicate pain by pressing on his back. Notes this pain feels somewhat different than his \"typical\" kidney stone episodes. The pain is more constant, less sharp, and positionally affected. He feels that he is not making as much urine as usual. Denies dark or bloody urine, dysuria, fever, chills, nausea, vomiting. Of note, pt fell 2 weeks ago onto a rock striking his right back lower ribs. Notes this has been sore since then and is now concerned for kidney injury. He believes he passed a kidney stone about a week ago. Denies light-headedness, cough, chest pain, shortness of breath, abdominal pain, extremity swelling. Currently taking prednisone 20mg BID for OH.             Past Medical History:   Diagnosis Date    Arrhythmia     AFIB    Arthritis     Atrial fibrillation ([facility] 2017    Chronic pain     RIGHT HIP    High cholesterol     Hypertension     Nausea & vomiting     Pulmonary embolism ([facility] 1999       Past Surgical History:   Procedure Laterality Date    HX ADENOIDECTOMY  1960    HX HEENT  1989    SEPTOPLASTY    HX SHOULDER ARTHROSCOPY Left 2018    HX TONSILLECTOMY  1960    HX VASECTOMY  2012    TOTAL HIP ARTHROPLASTY Right 2017         Family History:   Problem Relation Age of Onset    Hypertension Father     Stroke Father     Coronary Artery Disease Sister     Heart Disease Sister     Hypertension Mother     Kidney Disease Mother     Lupus Sister     Anesth Problems Neg Hx        Social History     Socioeconomic History    Marital status:      Spouse name: Not on file    Number of children: Not on file    Years of education: Not on file    Highest education level: Not on file   Occupational History    Not on file   Social Needs    Financial resource strain: Not on file   Maria Elena-Álvaro insecurity     Worry: Not on file     Inability: Not on file    Transportation needs     Medical: Not on file     Non-medical: Not on file   Tobacco Use    Smoking status: Never Smoker    Smokeless tobacco: Never Used   Substance and Sexual Activity    Alcohol use: Yes     Alcohol/week: 6.0 standard drinks     Types: 6 Cans of beer per week     Comment: 6 PACK A WEEK    Drug use: No    Sexual activity: Not on file   Lifestyle    Physical activity     Days per week: Not on file     Minutes per session: Not on file    Stress: Not on file   Relationships    Social connections     Talks on phone: Not on file     Gets together: Not on file     Attends Shinto service: Not on file     Active member of club or organization: Not on file     Attends meetings of clubs or organizations: Not on file     Relationship status: Not on file    Intimate partner violence     Fear of current or ex partner: Not on file     Emotionally abused: Not on file     Physically abused: Not on file     Forced sexual activity: Not on file   Other Topics Concern    Not on file   Social History Narrative    Not on file         ALLERGIES: Patient has no known allergies. Review of Systems   Constitutional: Negative for chills and fever. HENT: Negative for congestion and sore throat. Respiratory: Negative for cough and shortness of breath. Cardiovascular: Negative for chest pain and leg swelling. Gastrointestinal: Negative for abdominal pain, blood in stool, nausea and vomiting. Genitourinary: Positive for decreased urine volume and flank pain (right-sided). Negative for discharge, dysuria, hematuria and testicular pain. Musculoskeletal: Positive for back pain (lower right rib pain). Negative for myalgias. Skin: Negative for rash. Neurological: Negative for dizziness, weakness and light-headedness.        Vitals:    05/07/20 1043   BP: 150/87   Pulse: 80   Resp: 16   Temp: 98.8 °F (37.1 °C)   SpO2: 92%   Weight: 87.5 kg (193 lb)            Physical Exam  Vitals signs and nursing note reviewed. Constitutional:       General: He is not in acute distress. Appearance: He is not ill-appearing. HENT:      Head: Normocephalic. Nose: No rhinorrhea. Mouth/Throat:      Mouth: Mucous membranes are moist.      Pharynx: Oropharynx is clear. No posterior oropharyngeal erythema. Eyes:      Pupils: Pupils are equal, round, and reactive to light. Neck:      Musculoskeletal: Normal range of motion. Cardiovascular:      Rate and Rhythm: Normal rate and regular rhythm. Heart sounds: Normal heart sounds. Pulmonary:      Effort: Pulmonary effort is normal.      Breath sounds: Normal breath sounds. No wheezing. Abdominal:      General: There is no distension. Palpations: Abdomen is soft. Tenderness: There is no abdominal tenderness. There is no right CVA tenderness, left CVA tenderness or guarding. Comments: Right flank tenderness, inferolateral to right CVA, inferior to lower right rib tenderness   Musculoskeletal:         General: Tenderness (right lower posterior rib tenderness, no ecchymosis or obvious deformity) present. Skin:     General: Skin is warm. Capillary Refill: Capillary refill takes less than 2 seconds. Findings: No erythema or rash. Neurological:      Mental Status: He is alert. Psychiatric:         Mood and Affect: Mood normal.         Behavior: Behavior normal.        CT ABD PELV WO CONT   Final Result   IMPRESSION:   No acute intraperitoneal process is identified. Extensive hepatic steatosis. Limited evaluation of the deep pelvis secondary to scatter artifact from hip   arthroplasties. XR CHEST PA LAT   Final Result   IMPRESSION: No acute intrathoracic disease.            Medications   sodium chloride 0.9 % bolus infusion 1,000 mL (0 mL IntraVENous IV Completed 5/7/20 1324)   fentaNYL citrate (PF) injection 50 mcg (50 mcg IntraVENous Given 5/7/20 1141)     Labs Reviewed   URINALYSIS W/MICROSCOPIC - Abnormal; Notable for the following components:       Result Value    Blood SMALL (*)     All other components within normal limits   CBC WITH AUTOMATED DIFF - Abnormal; Notable for the following components:    WBC 15.3 (*)     RDW 17.2 (*)     NRBC 0.2 (*)     ABSOLUTE NRBC 0.03 (*)     NEUTROPHILS 78 (*)     LYMPHOCYTES 11 (*)     IMMATURE GRANULOCYTES 2 (*)     ABS. NEUTROPHILS 11.8 (*)     ABS. MONOCYTES 1.3 (*)     ABS. IMM. GRANS. 0.3 (*)     All other components within normal limits   METABOLIC PANEL, COMPREHENSIVE - Abnormal; Notable for the following components:    Sodium 146 (*)     Chloride 114 (*)     Glucose 112 (*)     BUN 25 (*)     Creatinine 1.36 (*)     GFR est non-AA 53 (*)     Protein, total 6.2 (*)     Albumin 2.9 (*)     A-G Ratio 0.9 (*)     All other components within normal limits   URINE CULTURE HOLD SAMPLE   LIPASE   SAMPLES BEING HELD         MDM    58 y/o male with pmhx of afib, HLD, HTN, PE, polymyaligia rheumatica, nephrolithiasis presents with right flank pain x 12 hours. Pt had fall striking the same area 2 weeks ago. Will evaluate for urorenal pathology, musculoskeletal pathology, hematologic pathology, and other infectious pathology. CT reveals no acute intraperitoneal process. UA with small blood. Discussed possibility of stone not seen on imaging as well as possibility of already having passed a stone. Discussed supportive measures of pain control and hydration. Provided stone hotline. Discussed indications to return to ED such as fever, uncontrollable n/v. White count likely due to current prednisone use. Creatinine appears roughly baseline. Extensive hepatic steatosis identified. LFTs ok. Discussed follow-up with PCP. Discussed case with attending physician, Dr. Helen Orellana.     Procedures    Siddhartha Anderson PA-C  5/7/2020

## 2020-09-11 RX ORDER — FLECAINIDE ACETATE 50 MG/1
TABLET ORAL
Qty: 60 TAB | Refills: 3 | Status: SHIPPED | OUTPATIENT
Start: 2020-09-11 | End: 2021-01-07

## 2020-12-21 RX ORDER — LANOLIN ALCOHOL/MO/W.PET/CERES
CREAM (GRAM) TOPICAL
Qty: 90 TAB | Refills: 0 | Status: SHIPPED | OUTPATIENT
Start: 2020-12-21 | End: 2021-03-19

## 2021-11-17 ENCOUNTER — OFFICE VISIT (OUTPATIENT)
Dept: ORTHOPEDIC SURGERY | Age: 66
End: 2021-11-17
Payer: COMMERCIAL

## 2021-11-17 VITALS — BODY MASS INDEX: 29.62 KG/M2 | WEIGHT: 200 LBS | HEIGHT: 69 IN

## 2021-11-17 DIAGNOSIS — M25.551 ACUTE RIGHT HIP PAIN: ICD-10-CM

## 2021-11-17 DIAGNOSIS — Z96.641 S/P TOTAL RIGHT HIP ARTHROPLASTY: Primary | ICD-10-CM

## 2021-11-17 PROCEDURE — 99214 OFFICE O/P EST MOD 30 MIN: CPT | Performed by: ORTHOPAEDIC SURGERY

## 2021-11-17 RX ORDER — MELOXICAM 15 MG/1
15 TABLET ORAL DAILY
Qty: 14 TABLET | Refills: 1 | Status: SHIPPED | OUTPATIENT
Start: 2021-11-17 | End: 2021-12-01

## 2021-11-17 RX ORDER — PREDNISONE 5 MG/1
TABLET ORAL
COMMUNITY
Start: 2021-11-13

## 2021-11-17 RX ORDER — DOXAZOSIN 1 MG/1
1 TABLET ORAL DAILY
COMMUNITY
Start: 2021-11-04

## 2021-11-17 RX ORDER — CELECOXIB 200 MG/1
200 CAPSULE ORAL DAILY
COMMUNITY
Start: 2021-11-11

## 2021-11-17 NOTE — PROGRESS NOTES
Donell Altman (: 1955) is a 77 y.o. male, patient, here for evaluation of the following chief complaint(s):  Hip Pain (right)       HPI:    Patient's chief complaint is right hip pain. Over the last several months he has noted increased weakness with his hip flexors. He is otherwise very active. Does have other diagnoses including rheumatologic diagnoses. He notices that his right hip flexors not as strong as it used to be. Does not have thigh pain. When he is weightbearing or even jumping up and down on his hip he has no significant symptoms. Does not complain of start up type pain. No Known Allergies    Current Outpatient Medications   Medication Sig    celecoxib (CELEBREX) 200 mg capsule Take 200 mg by mouth daily.  doxazosin (CARDURA) 1 mg tablet Take 1 mg by mouth daily.  predniSONE (DELTASONE) 5 mg tablet TAKE 1 TABLET BY MOUTH EVERY DAY (WITH 1 MG TABLETS FOR A TOTAL OF 9 MG DAILY)    magnesium oxide (MAG-OX) 400 mg tablet TAKE 1 TABLET BY MOUTH EVERY DAY    potassium chloride SR (KLOR-CON 10) 10 mEq tablet Take  by mouth.  tamsulosin (FLOMAX) 0.4 mg capsule Take 0.4 mg by mouth daily.  ELIQUIS 5 mg tablet TAKE 1 TABLET BY MOUTH TWO TIMES DAILY    amLODIPine (NORVASC) 10 mg tablet Take 10 mg by mouth daily (after breakfast).  metoprolol succinate (TOPROL-XL) 100 mg tablet Take 1 Tab by mouth nightly. (Patient taking differently: Take 100 mg by mouth daily (after breakfast). )    lisinopril (PRINIVIL, ZESTRIL) 20 mg tablet Take 1 Tab by mouth daily.  flecainide (TAMBOCOR) 50 mg tablet TAKE 1 TABLET BY MOUTH TWO TIMES DAILY (Patient not taking: Reported on 2021)    b complex vitamins tablet Take 1 Tab by mouth daily. (Patient not taking: Reported on 2021)     No current facility-administered medications for this visit.        Past Medical History:   Diagnosis Date    Arrhythmia     AFIB    Arthritis     Atrial fibrillation ([facility] [address] 2017    Chronic pain RIGHT HIP    High cholesterol     Hypertension     Nausea & vomiting     Pulmonary embolism ([facility] [address] 1999        Past Surgical History:   Procedure Laterality Date    HX ADENOIDECTOMY  1960    HX HEENT  1989    SEPTOPLASTY    HX SHOULDER ARTHROSCOPY Left 2018    HX TONSILLECTOMY  1960    HX VASECTOMY  2012    [place] TOTAL HIP ARTHROPLASTY Right 2017       Family History   Problem Relation Age of Onset    Hypertension Father     Stroke Father     Coronary Art Dis Sister     Heart Disease Sister     Hypertension Mother     Kidney Disease Mother     Lupus Sister     Anesth Problems Neg Hx         Social History     Socioeconomic History    Marital status:      Spouse name: Not on file    Number of children: Not on file    Years of education: Not on file    Highest education level: Not on file   Occupational History    Not on file   Tobacco Use    Smoking status: Never Smoker    Smokeless tobacco: Never Used   Substance and Sexual Activity    Alcohol use: Yes     Alcohol/week: 6.0 standard drinks     Types: 6 Cans of beer per week     Comment: 6 PACK A WEEK    Drug use: No    Sexual activity: Not on file   Other Topics Concern    Not on file   Social History Narrative    Not on file     Social Determinants of Health     Financial Resource Strain:     Difficulty of Paying Living Expenses: Not on file   Food Insecurity:     Worried About Running Out of Food in the Last Year: Not on file    Christoph of Food in the Last Year: Not on file   Transportation Needs:     Lack of Transportation (Medical): Not on file    Lack of Transportation (Non-Medical):  Not on file   Physical Activity:     Days of Exercise per Week: Not on file    Minutes of Exercise per Session: Not on file   Stress:     Feeling of Stress : Not on file   Social Connections:     Frequency of Communication with Friends and Family: Not on file    Frequency of Social Gatherings with Friends and Family: Not on file   Cherise Attends Yazdanism Services: Not on file    Active Member of Clubs or Organizations: Not on file    Attends Club or Organization Meetings: Not on file    Marital Status: Not on file   Intimate Partner Violence:     Fear of Current or Ex-Partner: Not on file    Emotionally Abused: Not on file    Physically Abused: Not on file    Sexually Abused: Not on file   Housing Stability:     Unable to Pay for Housing in the Last Year: Not on file    Number of [place] in the Last Year: Not on file    Unstable Housing in the Last Year: Not on file             Vitals:  Ht 5' 9\" (1.753 m)   Wt 200 lb (90.7 kg)   BMI 29.53 kg/m²    Body mass index is 29.53 kg/m². PHYSICAL EXAM:  Patient is well-developed well-nourished and in no acute distress. Patient ambulates with a normal gait. No focal neurologic abnormalities. Right hip exam:    Right hip exam today shows slight weakness of his hip flexor. Hip extends fully and flexes to 100 degrees. Logroll test and impingement test are both negative. No abductor weakness or tenderness over the greater trochanter. Extremity has intact quads, ankle plantar flexors, ankle dorsiflexors. Skin is intact. Foot is sensate and well perfused. Straight leg raise and femoral nerve stretch test are negative. IMAGING:  XR Results (most recent):  Results from Appointment encounter on 11/17/21    XR HIP RT W OR WO PELV 2-3 VWS    Narrative  3 x-ray views right hip. AP pelvis AP of hip and lateral of hip. Has a distally fixed stem. No evidence of subsidence or gross motion. Hypertrophy of the femur at the area of fixation. Acetabular component well fixed and normal.        ASSESSMENT/PLAN:  1. S/P total right hip arthroplasty  -     XR HIP RT W OR WO PELV 2-3 VWS; Future  -     REFERRAL TO PHYSICAL THERAPY; Future    Patient's right hip is intact radiographically with no changes since last office visit. Likely experiencing issues with his hip flexor.   This is a common issue after hip replacement. We will start him in some PT and start him on some anti-inflammatory medications. He is currently taking multiple different anti-inflammatories and I have asked him to stop his Celebrex and over-the-counter's until he finishes his prescription for meloxicam.  He is instructed to follow-up as needed. PT was prescribed. The radiographic findings of a distally fixed stem are unchanged. An electronic signature was used to authenticate this note.   --Mai Best MD

## 2022-03-18 PROBLEM — R53.82 CHRONIC FATIGUE: Status: ACTIVE | Noted: 2018-08-30

## 2022-03-19 PROBLEM — I48.0 PAROXYSMAL ATRIAL FIBRILLATION (HCC): Status: ACTIVE | Noted: 2017-10-03

## 2022-03-19 PROBLEM — E87.6 HYPOKALEMIA: Status: ACTIVE | Noted: 2019-07-17

## 2022-03-19 PROBLEM — M16.11 OSTEOARTHRITIS OF ONE HIP, RIGHT: Status: ACTIVE | Noted: 2017-10-30

## 2022-03-19 PROBLEM — M16.12 OSTEOARTHRITIS OF LEFT HIP: Status: ACTIVE | Noted: 2019-07-24

## 2022-03-20 PROBLEM — I10 HTN (HYPERTENSION), BENIGN: Status: ACTIVE | Noted: 2017-10-03

## 2022-03-20 PROBLEM — R73.09 ELEVATED HEMOGLOBIN A1C: Status: ACTIVE | Noted: 2019-07-17

## 2023-01-04 ENCOUNTER — HOSPITAL ENCOUNTER (EMERGENCY)
Age: 68
Discharge: HOME | End: 2023-01-04
Attending: STUDENT IN AN ORGANIZED HEALTH CARE EDUCATION/TRAINING PROGRAM
Payer: COMMERCIAL

## 2023-01-04 VITALS
BODY MASS INDEX: 27.32 KG/M2 | OXYGEN SATURATION: 95 % | TEMPERATURE: 98.5 F | WEIGHT: 185 LBS | DIASTOLIC BLOOD PRESSURE: 74 MMHG | SYSTOLIC BLOOD PRESSURE: 119 MMHG | HEART RATE: 90 BPM | RESPIRATION RATE: 17 BRPM

## 2023-01-04 DIAGNOSIS — T78.40XA ALLERGIC REACTION, INITIAL ENCOUNTER: Primary | ICD-10-CM

## 2023-01-04 PROCEDURE — 96372 THER/PROPH/DIAG INJ SC/IM: CPT

## 2023-01-04 PROCEDURE — 74011250637 HC RX REV CODE- 250/637: Performed by: STUDENT IN AN ORGANIZED HEALTH CARE EDUCATION/TRAINING PROGRAM

## 2023-01-04 PROCEDURE — 74011250636 HC RX REV CODE- 250/636: Performed by: STUDENT IN AN ORGANIZED HEALTH CARE EDUCATION/TRAINING PROGRAM

## 2023-01-04 PROCEDURE — 99284 EMERGENCY DEPT VISIT MOD MDM: CPT

## 2023-01-04 RX ORDER — FAMOTIDINE 20 MG/1
20 TABLET, FILM COATED ORAL
Status: COMPLETED | OUTPATIENT
Start: 2023-01-04 | End: 2023-01-04

## 2023-01-04 RX ORDER — PREDNISONE 20 MG/1
20 TABLET ORAL DAILY
Qty: 4 TABLET | Refills: 0 | Status: SHIPPED | OUTPATIENT
Start: 2023-01-05 | End: 2023-01-09

## 2023-01-04 RX ORDER — DEXAMETHASONE SODIUM PHOSPHATE 10 MG/ML
10 INJECTION INTRAMUSCULAR; INTRAVENOUS ONCE
Status: COMPLETED | OUTPATIENT
Start: 2023-01-04 | End: 2023-01-04

## 2023-01-04 RX ORDER — DIPHENHYDRAMINE HCL 25 MG
50 CAPSULE ORAL
Status: COMPLETED | OUTPATIENT
Start: 2023-01-04 | End: 2023-01-04

## 2023-01-04 RX ORDER — DEXAMETHASONE SODIUM PHOSPHATE 4 MG/ML
10 INJECTION, SOLUTION INTRA-ARTICULAR; INTRALESIONAL; INTRAMUSCULAR; INTRAVENOUS; SOFT TISSUE ONCE
Status: DISCONTINUED | OUTPATIENT
Start: 2023-01-04 | End: 2023-01-04

## 2023-01-04 RX ORDER — FAMOTIDINE 20 MG/1
20 TABLET, FILM COATED ORAL 2 TIMES DAILY
Qty: 28 TABLET | Refills: 0 | Status: SHIPPED | OUTPATIENT
Start: 2023-01-04 | End: 2023-01-18

## 2023-01-04 RX ADMIN — FAMOTIDINE 20 MG: 20 TABLET, FILM COATED ORAL at 05:07

## 2023-01-04 RX ADMIN — DEXAMETHASONE SODIUM PHOSPHATE 10 MG: 10 INJECTION, SOLUTION INTRAMUSCULAR; INTRAVENOUS at 05:10

## 2023-01-04 RX ADMIN — DIPHENHYDRAMINE HYDROCHLORIDE 50 MG: 25 CAPSULE ORAL at 05:07

## 2023-01-04 NOTE — ED TRIAGE NOTES
Pt. States started with rash about 3 days ago, states took benadryl for the last few days, states just started plavix a two weeks ago.

## 2023-01-04 NOTE — ED PROVIDER NOTES
69-year-old male with history of A. fib status post watchman not on anticoagulation, HTN presents to the ED with chief complaint of rash for the past 3 to 4 days. Patient says that rash is pruritic, nonpainful, began on his arms, spread to his trunk, groin, and neck. No associated shortness of breath, difficulty swallowing, or speech changes. Did start Plavix 2 weeks ago, has not been taking it for the past 2 days. No other new foods, exposures, or other potential allergens that he can think of. Otherwise feeling his normal self, no fevers, chills, chest pain, abdominal pain, urinary symptoms, bowel symptoms. Has been using Benadryl at home with intermittent relief. He does have history of polymyalgia rheumatica, is currently tapering down his prednisone and is on 3 mg a day. The history is provided by the patient. Rash        Past Medical History:   Diagnosis Date    Arrhythmia     AFIB    Arthritis     Atrial fibrillation ([address] 2017    Chronic pain     RIGHT HIP    High cholesterol     Hypertension     Nausea & vomiting     Pulmonary embolism ([address] 1999       Past Surgical History:   Procedure Laterality Date    HX ADENOIDECTOMY  1960    HX HEENT  1989    SEPTOPLASTY    HX SHOULDER ARTHROSCOPY Left 2018    HX TONSILLECTOMY  1960    HX VASECTOMY  2012    [place] TOTAL HIP ARTHROPLASTY Right 2017         Family History:   Problem Relation Age of Onset    Hypertension Father     Stroke Father     Coronary Art Dis Sister     Heart Disease Sister     Hypertension Mother     Kidney Disease Mother     Lupus Sister     Anesth Problems Neg Hx        Social History     Socioeconomic History    Marital status:      Spouse name: Not on file    Number of children: Not on file    Years of education: Not on file    Highest education level: Not on file   Occupational History    Not on file   Tobacco Use    Smoking status: Never    Smokeless tobacco: Never   Substance and Sexual Activity    Alcohol use:  Yes Alcohol/week: 6.0 standard drinks     Types: 6 Cans of beer per week     Comment: 6 PACK A WEEK    Drug use: No    Sexual activity: Not on file   Other Topics Concern    Not on file   Social History Narrative    Not on file     Social Determinants of Health     Financial Resource Strain: Not on file   Food Insecurity: Not on file   Transportation Needs: Not on file   Physical Activity: Not on file   Stress: Not on file   Social Connections: Not on file   Intimate Partner Violence: Not on file   Housing Stability: Not on file         ALLERGIES: Patient has no known allergies. Review of Systems   Constitutional:  Negative for chills and fever. HENT:  Negative for congestion and rhinorrhea. Respiratory:  Negative for cough, shortness of breath and wheezing. Cardiovascular:  Negative for chest pain and leg swelling. Gastrointestinal:  Negative for abdominal pain, constipation, diarrhea, nausea and vomiting. Genitourinary:  Negative for difficulty urinating, dysuria and hematuria. Musculoskeletal:  Negative for back pain and neck pain. Skin:  Positive for rash. Negative for color change. Neurological:  Negative for weakness, numbness and headaches. Psychiatric/Behavioral:  Negative for behavioral problems and confusion. Vitals:    01/04/23 0454   BP: 119/74   Pulse: 90   Resp: 17   Temp: 98.5 °F (36.9 °C)   SpO2: 95%   Weight: 83.9 kg (185 lb)            Physical Exam  Constitutional:       General: He is not in acute distress. Appearance: He is well-developed. HENT:      Head: Normocephalic and atraumatic. Eyes:      Conjunctiva/sclera: Conjunctivae normal.      Pupils: Pupils are equal, round, and reactive to light. Neck:      Trachea: No tracheal deviation. Cardiovascular:      Rate and Rhythm: Normal rate and regular rhythm. Heart sounds: No murmur heard. No friction rub. No gallop. Pulmonary:      Effort: No respiratory distress.       Breath sounds: Normal breath sounds. Abdominal:      General: Bowel sounds are normal. There is no distension. Palpations: Abdomen is soft. Tenderness: There is no abdominal tenderness. Musculoskeletal:         General: No deformity. Cervical back: Neck supple. Skin:     General: Skin is warm and dry. Comments: Scattered urticarial rash to bilateral arms, trunk, inguinal region   Neurological:      Mental Status: He is alert and oriented to person, place, and time. Medical Decision Making  49-year-old male presenting with allergic reaction. No evidence of airway or other systemic involvement outside of cutaneous. Will treat with steroid burst, Benadryl, Pepcid, and have follow-up with primary care. Patient otherwise stable, no indication for any labs or imaging at this time. Problems Addressed: Allergic reaction, initial encounter: acute illness or injury    Risk  OTC drugs. Prescription drug management. Procedures    DISCHARGE NOTE:  5:06 AM  The patient has been re-evaluated and feeling much better and are stable for discharge. All available radiology and laboratory results have been reviewed with patient and/or available family. Patient and/or family verbally conveyed their understanding and agreement of the patient's signs, symptoms, diagnosis, treatment and prognosis and additionally agree to follow-up as recommended in the discharge instructions or to return to the Emergency Department should their condition change or worsen prior to their follow-up appointment. All questions have been answered and patient and/or available family express understanding. LABORATORY RESULTS:  No results found for this or any previous visit (from the past 24 hour(s)). IMAGING RESULTS:  No results found.     MEDICATIONS GIVEN:  Medications   famotidine (PEPCID) tablet 20 mg (has no administration in time range)   diphenhydrAMINE (BENADRYL) capsule 50 mg (has no administration in time range) dexamethasone (DECADRON) 4 mg/mL injection 10 mg (has no administration in time range)       IMPRESSION:  1. Allergic reaction, initial encounter        PLAN:  Follow-up Information       Follow up With Specialties Details Why Contact Info    Moi Snow MD Family Medicine In 3 days  [address]  [phone]      [facility] EMERGENCY DEPT Emergency Medicine  As needed, If symptoms worsen [address] [facility]  [phone]          Current Discharge Medication List        START taking these medications    Details   ! ! predniSONE (DELTASONE) 20 mg tablet Take 1 Tablet by mouth daily for 4 days. With Breakfast  Qty: 4 Tablet, Refills: 0  Start date: 1/5/2023, End date: 1/9/2023      famotidine (PEPCID) 20 mg tablet Take 1 Tablet by mouth two (2) times a day for 14 days. Qty: 28 Tablet, Refills: 0  Start date: 1/4/2023, End date: 1/18/2023       !! - Potential duplicate medications found. Please discuss with provider. CONTINUE these medications which have NOT CHANGED    Details   ! ! predniSONE (DELTASONE) 5 mg tablet TAKE 1 TABLET BY MOUTH EVERY DAY (WITH 1 MG TABLETS FOR A TOTAL OF 9 MG DAILY)  Start date: 11/13/2021       !! - Potential duplicate medications found. Please discuss with provider.           Signed By: Alejandro Keyes MD     January 4, 2023

## 2023-01-04 NOTE — Clinical Note
[address]  [facility] EMERGENCY DEPT  [address] [id number]  [phone]    Work/School Note    Date: 1/4/2023    To Whom It May concern:    Rashi Rice was seen and treated today in the emergency room by the following provider(s):  Attending Provider: Keaton Quiroz MD.      Rashi Rice is excused from work/school on 01/04/23 and 01/05/23. He is medically clear to return to work/school on 1/6/2023.        Sincerely,          Leighann Krishnamurthy MD

## 2023-05-21 RX ORDER — AMLODIPINE BESYLATE 10 MG/1
10 TABLET ORAL
COMMUNITY
Start: 2018-05-31

## 2023-05-21 RX ORDER — TAMSULOSIN HYDROCHLORIDE 0.4 MG/1
0.4 CAPSULE ORAL DAILY
COMMUNITY

## 2023-05-21 RX ORDER — FLECAINIDE ACETATE 50 MG/1
1 TABLET ORAL 2 TIMES DAILY
COMMUNITY
Start: 2021-01-07

## 2023-05-21 RX ORDER — DOXAZOSIN MESYLATE 1 MG/1
1 TABLET ORAL DAILY
COMMUNITY
Start: 2021-11-04

## 2023-05-21 RX ORDER — CELECOXIB 200 MG/1
200 CAPSULE ORAL DAILY
COMMUNITY
Start: 2021-11-11

## 2023-05-21 RX ORDER — POTASSIUM CHLORIDE 750 MG/1
TABLET, FILM COATED, EXTENDED RELEASE ORAL
COMMUNITY

## 2023-05-21 RX ORDER — PREDNISONE 5 MG/1
TABLET ORAL
COMMUNITY
Start: 2021-11-13

## 2023-05-21 RX ORDER — LISINOPRIL 20 MG/1
20 TABLET ORAL DAILY
COMMUNITY
Start: 2018-05-29

## 2023-05-21 RX ORDER — METOPROLOL SUCCINATE 100 MG/1
100 TABLET, EXTENDED RELEASE ORAL
COMMUNITY
Start: 2018-05-29

## 2023-05-21 RX ORDER — MAGNESIUM OXIDE 400 MG/1
1 TABLET ORAL DAILY
COMMUNITY
Start: 2021-03-19

## 2023-08-23 ENCOUNTER — HOSPITAL ENCOUNTER (OUTPATIENT)
Facility: HOSPITAL | Age: 68
Discharge: HOME | End: 2023-08-26

## 2023-08-23 VITALS
HEIGHT: 69 IN | OXYGEN SATURATION: 96 % | DIASTOLIC BLOOD PRESSURE: 96 MMHG | HEART RATE: 88 BPM | SYSTOLIC BLOOD PRESSURE: 159 MMHG | BODY MASS INDEX: 29.03 KG/M2 | RESPIRATION RATE: 16 BRPM | WEIGHT: 196 LBS

## 2023-08-23 DIAGNOSIS — C61 PROSTATE CANCER (HCC): Primary | ICD-10-CM

## 2023-08-23 NOTE — PROGRESS NOTES
RADIATION ONCOLOGY INITIAL CONSULTATION NOTE    Encounter Date: 08/23/23  Patient Name: Sam Carrasco  Medical Record Number: [id number]  Referring Physician: Leroy Ford MD  [address] [address]  [facility]  [place],  [address]  Primary Care Provider: Ania Romero MD  Diagnosis:  Z98 - malignant neoplasm of prostate  cT2b cN0M0; PSA<10; G5, 4+5=9  AJCC Staging has been reviewed      ASSESSMENT:  78 yo M with high risk risk prostate cancer, cT2b, iPSA 7.7, Erna 4+5=9 in 3 cores (10/15 total cores involved). I reviewed the natural history of high risk prostate cancer and the therapeutic options available including radiation therapy and surgery. The patient also had a discussion regarding treatment with Dr. Barnetta Phalen (Urology). Both radiation and surgery serve as first-line options. However, for a patient with high risk prostate cancer pursuing surgery, there is a possibility that he may have positive surgical margins or pT3 disease, and therefore have indications for adjuvant radiotherapy. I discussed the MSKCC nomogram which showed his risk for organ confined disease is 6%, EPE is 93%, SVI 52%, and nohemy involvement 50%. His staging PSMA PET/CT showed focal uptake within the prostate at three locations, but no regional or distant spread of disease. In terms of radiation, the patient is a candidate for intensity modulated radiation therapy (IMRT) along with 18 months of androgen deprivation therapy (ADT). I do not recommend the combination of IMRT/brachytherapy given the size of his gland (76cc on TRUS and 84cc on MRI). I discussed the logistics of IMRT including a planning session followed by 26-28 treatments over 5-5.5 weeks, M-F, five days a week. Potential side effects of radiation can include urinary symptoms (increased frequency and urgency, dysuria, and obstructive symptoms), GI symptoms (loose stools and blood in stool), and sexual dysfunction.  However, urinary incontinence

## 2023-08-23 NOTE — ADDENDUM NOTE
Encounter addended by: Avelino Holstein, MD on: 8/23/2023 1:37 PM   Actions taken: Clinical Note Signed

## 2023-09-20 ENCOUNTER — HOSPITAL ENCOUNTER (OUTPATIENT)
Facility: HOSPITAL | Age: 68
Discharge: HOME | End: 2023-09-23
Attending: RADIOLOGY

## 2023-10-16 ENCOUNTER — HOSPITAL ENCOUNTER (OUTPATIENT)
Facility: HOSPITAL | Age: 68
Discharge: HOME | End: 2023-10-19
Attending: RADIOLOGY

## 2023-10-16 LAB
RAD ONC ARIA COURSE FIRST TREATMENT DATE: NORMAL
RAD ONC ARIA COURSE ID: NORMAL
RAD ONC ARIA COURSE LAST TREATMENT DATE: NORMAL
RAD ONC ARIA COURSE SESSION NUMBER: 1
RAD ONC ARIA COURSE TREATMENT ELAPSED DAYS: 0
RAD ONC ARIA PLAN FRACTIONS TREATED TO DATE: 1
RAD ONC ARIA PLAN ID: NORMAL
RAD ONC ARIA PLAN PRESCRIBED DOSE PER FRACTION: 2.5 GY
RAD ONC ARIA PLAN PRIMARY REFERENCE POINT: NORMAL
RAD ONC ARIA PLAN TOTAL FRACTIONS PRESCRIBED: 28
RAD ONC ARIA PLAN TOTAL PRESCRIBED DOSE: 7000 CGY
RAD ONC ARIA REFERENCE POINT DOSAGE GIVEN TO DATE: 1.8 GY
RAD ONC ARIA REFERENCE POINT DOSAGE GIVEN TO DATE: 2.5 GY
RAD ONC ARIA REFERENCE POINT DOSAGE GIVEN TO DATE: 2.62 GY
RAD ONC ARIA REFERENCE POINT ID: NORMAL
RAD ONC ARIA REFERENCE POINT SESSION DOSAGE GIVEN: 1.8 GY
RAD ONC ARIA REFERENCE POINT SESSION DOSAGE GIVEN: 2.5 GY
RAD ONC ARIA REFERENCE POINT SESSION DOSAGE GIVEN: 2.62 GY

## 2023-10-17 ENCOUNTER — HOSPITAL ENCOUNTER (OUTPATIENT)
Facility: HOSPITAL | Age: 68
Discharge: HOME | End: 2023-10-20
Attending: RADIOLOGY

## 2023-10-17 LAB
RAD ONC ARIA COURSE FIRST TREATMENT DATE: NORMAL
RAD ONC ARIA COURSE ID: NORMAL
RAD ONC ARIA COURSE LAST TREATMENT DATE: NORMAL
RAD ONC ARIA COURSE SESSION NUMBER: 2
RAD ONC ARIA COURSE TREATMENT ELAPSED DAYS: 1
RAD ONC ARIA PLAN FRACTIONS TREATED TO DATE: 2
RAD ONC ARIA PLAN ID: NORMAL
RAD ONC ARIA PLAN PRESCRIBED DOSE PER FRACTION: 2.5 GY
RAD ONC ARIA PLAN PRIMARY REFERENCE POINT: NORMAL
RAD ONC ARIA PLAN TOTAL FRACTIONS PRESCRIBED: 28
RAD ONC ARIA PLAN TOTAL PRESCRIBED DOSE: 7000 CGY
RAD ONC ARIA REFERENCE POINT DOSAGE GIVEN TO DATE: 3.6 GY
RAD ONC ARIA REFERENCE POINT DOSAGE GIVEN TO DATE: 5 GY
RAD ONC ARIA REFERENCE POINT DOSAGE GIVEN TO DATE: 5.24 GY
RAD ONC ARIA REFERENCE POINT ID: NORMAL
RAD ONC ARIA REFERENCE POINT SESSION DOSAGE GIVEN: 1.8 GY
RAD ONC ARIA REFERENCE POINT SESSION DOSAGE GIVEN: 2.5 GY
RAD ONC ARIA REFERENCE POINT SESSION DOSAGE GIVEN: 2.62 GY

## 2023-10-18 ENCOUNTER — HOSPITAL ENCOUNTER (OUTPATIENT)
Facility: HOSPITAL | Age: 68
Discharge: HOME | End: 2023-10-21
Attending: RADIOLOGY

## 2023-10-18 LAB
RAD ONC ARIA COURSE FIRST TREATMENT DATE: NORMAL
RAD ONC ARIA COURSE ID: NORMAL
RAD ONC ARIA COURSE LAST TREATMENT DATE: NORMAL
RAD ONC ARIA COURSE SESSION NUMBER: 3
RAD ONC ARIA COURSE TREATMENT ELAPSED DAYS: 2
RAD ONC ARIA PLAN FRACTIONS TREATED TO DATE: 3
RAD ONC ARIA PLAN ID: NORMAL
RAD ONC ARIA PLAN PRESCRIBED DOSE PER FRACTION: 2.5 GY
RAD ONC ARIA PLAN PRIMARY REFERENCE POINT: NORMAL
RAD ONC ARIA PLAN TOTAL FRACTIONS PRESCRIBED: 28
RAD ONC ARIA PLAN TOTAL PRESCRIBED DOSE: 7000 CGY
RAD ONC ARIA REFERENCE POINT DOSAGE GIVEN TO DATE: 5.4 GY
RAD ONC ARIA REFERENCE POINT DOSAGE GIVEN TO DATE: 7.5 GY
RAD ONC ARIA REFERENCE POINT DOSAGE GIVEN TO DATE: 7.86 GY
RAD ONC ARIA REFERENCE POINT ID: NORMAL
RAD ONC ARIA REFERENCE POINT SESSION DOSAGE GIVEN: 1.8 GY
RAD ONC ARIA REFERENCE POINT SESSION DOSAGE GIVEN: 2.5 GY
RAD ONC ARIA REFERENCE POINT SESSION DOSAGE GIVEN: 2.62 GY

## 2023-10-19 ENCOUNTER — HOSPITAL ENCOUNTER (OUTPATIENT)
Facility: HOSPITAL | Age: 68
Discharge: HOME | End: 2023-10-22
Attending: RADIOLOGY

## 2023-10-19 LAB
RAD ONC ARIA COURSE FIRST TREATMENT DATE: NORMAL
RAD ONC ARIA COURSE ID: NORMAL
RAD ONC ARIA COURSE LAST TREATMENT DATE: NORMAL
RAD ONC ARIA COURSE SESSION NUMBER: 4
RAD ONC ARIA COURSE TREATMENT ELAPSED DAYS: 3
RAD ONC ARIA PLAN FRACTIONS TREATED TO DATE: 4
RAD ONC ARIA PLAN ID: NORMAL
RAD ONC ARIA PLAN PRESCRIBED DOSE PER FRACTION: 2.5 GY
RAD ONC ARIA PLAN PRIMARY REFERENCE POINT: NORMAL
RAD ONC ARIA PLAN TOTAL FRACTIONS PRESCRIBED: 28
RAD ONC ARIA PLAN TOTAL PRESCRIBED DOSE: 7000 CGY
RAD ONC ARIA REFERENCE POINT DOSAGE GIVEN TO DATE: 10 GY
RAD ONC ARIA REFERENCE POINT DOSAGE GIVEN TO DATE: 10.48 GY
RAD ONC ARIA REFERENCE POINT DOSAGE GIVEN TO DATE: 7.2 GY
RAD ONC ARIA REFERENCE POINT ID: NORMAL
RAD ONC ARIA REFERENCE POINT SESSION DOSAGE GIVEN: 1.8 GY
RAD ONC ARIA REFERENCE POINT SESSION DOSAGE GIVEN: 2.5 GY
RAD ONC ARIA REFERENCE POINT SESSION DOSAGE GIVEN: 2.62 GY

## 2023-10-20 ENCOUNTER — HOSPITAL ENCOUNTER (OUTPATIENT)
Facility: HOSPITAL | Age: 68
Discharge: HOME | End: 2023-10-23
Attending: RADIOLOGY

## 2023-10-20 LAB
RAD ONC ARIA COURSE FIRST TREATMENT DATE: NORMAL
RAD ONC ARIA COURSE ID: NORMAL
RAD ONC ARIA COURSE LAST TREATMENT DATE: NORMAL
RAD ONC ARIA COURSE SESSION NUMBER: 5
RAD ONC ARIA COURSE TREATMENT ELAPSED DAYS: 4
RAD ONC ARIA PLAN FRACTIONS TREATED TO DATE: 5
RAD ONC ARIA PLAN ID: NORMAL
RAD ONC ARIA PLAN PRESCRIBED DOSE PER FRACTION: 2.5 GY
RAD ONC ARIA PLAN PRIMARY REFERENCE POINT: NORMAL
RAD ONC ARIA PLAN TOTAL FRACTIONS PRESCRIBED: 28
RAD ONC ARIA PLAN TOTAL PRESCRIBED DOSE: 7000 CGY
RAD ONC ARIA REFERENCE POINT DOSAGE GIVEN TO DATE: 12.5 GY
RAD ONC ARIA REFERENCE POINT DOSAGE GIVEN TO DATE: 13.1 GY
RAD ONC ARIA REFERENCE POINT DOSAGE GIVEN TO DATE: 9 GY
RAD ONC ARIA REFERENCE POINT ID: NORMAL
RAD ONC ARIA REFERENCE POINT SESSION DOSAGE GIVEN: 1.8 GY
RAD ONC ARIA REFERENCE POINT SESSION DOSAGE GIVEN: 2.5 GY
RAD ONC ARIA REFERENCE POINT SESSION DOSAGE GIVEN: 2.62 GY

## 2023-10-23 ENCOUNTER — HOSPITAL ENCOUNTER (OUTPATIENT)
Facility: HOSPITAL | Age: 68
Discharge: HOME | End: 2023-10-26
Attending: RADIOLOGY

## 2023-10-23 DIAGNOSIS — C61 MALIGNANT NEOPLASM OF PROSTATE (HCC): ICD-10-CM

## 2023-10-23 LAB
RAD ONC ARIA COURSE FIRST TREATMENT DATE: NORMAL
RAD ONC ARIA COURSE ID: NORMAL
RAD ONC ARIA COURSE LAST TREATMENT DATE: NORMAL
RAD ONC ARIA COURSE SESSION NUMBER: 6
RAD ONC ARIA COURSE TREATMENT ELAPSED DAYS: 7
RAD ONC ARIA PLAN FRACTIONS TREATED TO DATE: 6
RAD ONC ARIA PLAN ID: NORMAL
RAD ONC ARIA PLAN PRESCRIBED DOSE PER FRACTION: 2.5 GY
RAD ONC ARIA PLAN PRIMARY REFERENCE POINT: NORMAL
RAD ONC ARIA PLAN TOTAL FRACTIONS PRESCRIBED: 28
RAD ONC ARIA PLAN TOTAL PRESCRIBED DOSE: 7000 CGY
RAD ONC ARIA REFERENCE POINT DOSAGE GIVEN TO DATE: 10.8 GY
RAD ONC ARIA REFERENCE POINT DOSAGE GIVEN TO DATE: 15 GY
RAD ONC ARIA REFERENCE POINT DOSAGE GIVEN TO DATE: 15.72 GY
RAD ONC ARIA REFERENCE POINT ID: NORMAL
RAD ONC ARIA REFERENCE POINT SESSION DOSAGE GIVEN: 1.8 GY
RAD ONC ARIA REFERENCE POINT SESSION DOSAGE GIVEN: 2.5 GY
RAD ONC ARIA REFERENCE POINT SESSION DOSAGE GIVEN: 2.62 GY

## 2023-10-23 ASSESSMENT — PAIN SCALES - GENERAL: PAINLEVEL_OUTOF10: 0

## 2023-10-23 NOTE — PROGRESS NOTES
daily, Disp: , Rfl:     lisinopril (PRINIVIL;ZESTRIL) 20 MG tablet, Take 1 tablet by mouth daily (Patient not taking: Reported on 8/23/2023), Disp: , Rfl:     magnesium oxide (MAG-OX) 400 MG tablet, Take 1 tablet by mouth daily, Disp: , Rfl:     metoprolol succinate (TOPROL XL) 100 MG extended release tablet, Take 1 tablet by mouth, Disp: , Rfl:     potassium chloride (KLOR-CON) 10 MEQ extended release tablet, Take by mouth, Disp: , Rfl:     predniSONE (DELTASONE) 5 MG tablet, Take 1 mg by mouth daily, Disp: , Rfl:     tamsulosin (FLOMAX) 0.4 MG capsule, Take 1 capsule by mouth daily, Disp: , Rfl:       ASSESSMENT/PLAN:   - Continue radiation treatment as planned  - Treatment setup and plan reviewed. Port images/CBCT images reviewed. Appropriate laboratory work was reviewed. - Treatment side effects and toxicities reviewed with the patient, and appropriate management was advised.          Isai Padron MD  [facility]  [address]  P: [address] [address]  P: [address] [place] [id number] [facility], [address] [place] [address]  P: [phone]

## 2023-10-24 ENCOUNTER — HOSPITAL ENCOUNTER (OUTPATIENT)
Facility: HOSPITAL | Age: 68
Discharge: HOME | End: 2023-10-27
Attending: RADIOLOGY

## 2023-10-24 LAB
RAD ONC ARIA COURSE FIRST TREATMENT DATE: NORMAL
RAD ONC ARIA COURSE ID: NORMAL
RAD ONC ARIA COURSE LAST TREATMENT DATE: NORMAL
RAD ONC ARIA COURSE SESSION NUMBER: 7
RAD ONC ARIA COURSE TREATMENT ELAPSED DAYS: 8
RAD ONC ARIA PLAN FRACTIONS TREATED TO DATE: 7
RAD ONC ARIA PLAN ID: NORMAL
RAD ONC ARIA PLAN PRESCRIBED DOSE PER FRACTION: 2.5 GY
RAD ONC ARIA PLAN PRIMARY REFERENCE POINT: NORMAL
RAD ONC ARIA PLAN TOTAL FRACTIONS PRESCRIBED: 28
RAD ONC ARIA PLAN TOTAL PRESCRIBED DOSE: 7000 CGY
RAD ONC ARIA REFERENCE POINT DOSAGE GIVEN TO DATE: 12.6 GY
RAD ONC ARIA REFERENCE POINT DOSAGE GIVEN TO DATE: 17.5 GY
RAD ONC ARIA REFERENCE POINT DOSAGE GIVEN TO DATE: 18.34 GY
RAD ONC ARIA REFERENCE POINT ID: NORMAL
RAD ONC ARIA REFERENCE POINT SESSION DOSAGE GIVEN: 1.8 GY
RAD ONC ARIA REFERENCE POINT SESSION DOSAGE GIVEN: 2.5 GY
RAD ONC ARIA REFERENCE POINT SESSION DOSAGE GIVEN: 2.62 GY

## 2023-10-25 ENCOUNTER — HOSPITAL ENCOUNTER (OUTPATIENT)
Facility: HOSPITAL | Age: 68
Discharge: HOME | End: 2023-10-28
Attending: RADIOLOGY

## 2023-10-25 LAB
RAD ONC ARIA COURSE FIRST TREATMENT DATE: NORMAL
RAD ONC ARIA COURSE ID: NORMAL
RAD ONC ARIA COURSE LAST TREATMENT DATE: NORMAL
RAD ONC ARIA COURSE SESSION NUMBER: 8
RAD ONC ARIA COURSE TREATMENT ELAPSED DAYS: 9
RAD ONC ARIA PLAN FRACTIONS TREATED TO DATE: 8
RAD ONC ARIA PLAN ID: NORMAL
RAD ONC ARIA PLAN PRESCRIBED DOSE PER FRACTION: 2.5 GY
RAD ONC ARIA PLAN PRIMARY REFERENCE POINT: NORMAL
RAD ONC ARIA PLAN TOTAL FRACTIONS PRESCRIBED: 28
RAD ONC ARIA PLAN TOTAL PRESCRIBED DOSE: 7000 CGY
RAD ONC ARIA REFERENCE POINT DOSAGE GIVEN TO DATE: 14.4 GY
RAD ONC ARIA REFERENCE POINT DOSAGE GIVEN TO DATE: 20 GY
RAD ONC ARIA REFERENCE POINT DOSAGE GIVEN TO DATE: 20.97 GY
RAD ONC ARIA REFERENCE POINT ID: NORMAL
RAD ONC ARIA REFERENCE POINT SESSION DOSAGE GIVEN: 1.8 GY
RAD ONC ARIA REFERENCE POINT SESSION DOSAGE GIVEN: 2.5 GY
RAD ONC ARIA REFERENCE POINT SESSION DOSAGE GIVEN: 2.62 GY

## 2023-10-26 ENCOUNTER — HOSPITAL ENCOUNTER (OUTPATIENT)
Facility: HOSPITAL | Age: 68
Discharge: HOME | End: 2023-10-29
Attending: RADIOLOGY

## 2023-10-26 LAB
RAD ONC ARIA COURSE FIRST TREATMENT DATE: NORMAL
RAD ONC ARIA COURSE ID: NORMAL
RAD ONC ARIA COURSE LAST TREATMENT DATE: NORMAL
RAD ONC ARIA COURSE SESSION NUMBER: 9
RAD ONC ARIA COURSE TREATMENT ELAPSED DAYS: 10
RAD ONC ARIA PLAN FRACTIONS TREATED TO DATE: 9
RAD ONC ARIA PLAN ID: NORMAL
RAD ONC ARIA PLAN PRESCRIBED DOSE PER FRACTION: 2.5 GY
RAD ONC ARIA PLAN PRIMARY REFERENCE POINT: NORMAL
RAD ONC ARIA PLAN TOTAL FRACTIONS PRESCRIBED: 28
RAD ONC ARIA PLAN TOTAL PRESCRIBED DOSE: 7000 CGY
RAD ONC ARIA REFERENCE POINT DOSAGE GIVEN TO DATE: 16.2 GY
RAD ONC ARIA REFERENCE POINT DOSAGE GIVEN TO DATE: 22.5 GY
RAD ONC ARIA REFERENCE POINT DOSAGE GIVEN TO DATE: 23.59 GY
RAD ONC ARIA REFERENCE POINT ID: NORMAL
RAD ONC ARIA REFERENCE POINT SESSION DOSAGE GIVEN: 1.8 GY
RAD ONC ARIA REFERENCE POINT SESSION DOSAGE GIVEN: 2.5 GY
RAD ONC ARIA REFERENCE POINT SESSION DOSAGE GIVEN: 2.62 GY

## 2023-10-27 ENCOUNTER — HOSPITAL ENCOUNTER (OUTPATIENT)
Facility: HOSPITAL | Age: 68
Discharge: HOME | End: 2023-10-30
Attending: RADIOLOGY

## 2023-10-27 LAB
RAD ONC ARIA COURSE FIRST TREATMENT DATE: NORMAL
RAD ONC ARIA COURSE ID: NORMAL
RAD ONC ARIA COURSE LAST TREATMENT DATE: NORMAL
RAD ONC ARIA COURSE SESSION NUMBER: 10
RAD ONC ARIA COURSE TREATMENT ELAPSED DAYS: 11
RAD ONC ARIA PLAN FRACTIONS TREATED TO DATE: 10
RAD ONC ARIA PLAN ID: NORMAL
RAD ONC ARIA PLAN PRESCRIBED DOSE PER FRACTION: 2.5 GY
RAD ONC ARIA PLAN PRIMARY REFERENCE POINT: NORMAL
RAD ONC ARIA PLAN TOTAL FRACTIONS PRESCRIBED: 28
RAD ONC ARIA PLAN TOTAL PRESCRIBED DOSE: 7000 CGY
RAD ONC ARIA REFERENCE POINT DOSAGE GIVEN TO DATE: 18 GY
RAD ONC ARIA REFERENCE POINT DOSAGE GIVEN TO DATE: 25 GY
RAD ONC ARIA REFERENCE POINT DOSAGE GIVEN TO DATE: 26.21 GY
RAD ONC ARIA REFERENCE POINT ID: NORMAL
RAD ONC ARIA REFERENCE POINT SESSION DOSAGE GIVEN: 1.8 GY
RAD ONC ARIA REFERENCE POINT SESSION DOSAGE GIVEN: 2.5 GY
RAD ONC ARIA REFERENCE POINT SESSION DOSAGE GIVEN: 2.62 GY

## 2023-10-30 ENCOUNTER — HOSPITAL ENCOUNTER (OUTPATIENT)
Facility: HOSPITAL | Age: 68
Discharge: HOME | End: 2023-11-02
Attending: RADIOLOGY

## 2023-10-30 DIAGNOSIS — C61 PROSTATE CANCER (HCC): Primary | ICD-10-CM

## 2023-10-30 DIAGNOSIS — R11.0 NAUSEA: ICD-10-CM

## 2023-10-30 LAB
RAD ONC ARIA COURSE FIRST TREATMENT DATE: NORMAL
RAD ONC ARIA COURSE ID: NORMAL
RAD ONC ARIA COURSE LAST TREATMENT DATE: NORMAL
RAD ONC ARIA COURSE SESSION NUMBER: 11
RAD ONC ARIA COURSE TREATMENT ELAPSED DAYS: 14
RAD ONC ARIA PLAN FRACTIONS TREATED TO DATE: 11
RAD ONC ARIA PLAN ID: NORMAL
RAD ONC ARIA PLAN PRESCRIBED DOSE PER FRACTION: 2.5 GY
RAD ONC ARIA PLAN PRIMARY REFERENCE POINT: NORMAL
RAD ONC ARIA PLAN TOTAL FRACTIONS PRESCRIBED: 28
RAD ONC ARIA PLAN TOTAL PRESCRIBED DOSE: 7000 CGY
RAD ONC ARIA REFERENCE POINT DOSAGE GIVEN TO DATE: 19.8 GY
RAD ONC ARIA REFERENCE POINT DOSAGE GIVEN TO DATE: 27.5 GY
RAD ONC ARIA REFERENCE POINT DOSAGE GIVEN TO DATE: 28.83 GY
RAD ONC ARIA REFERENCE POINT ID: NORMAL
RAD ONC ARIA REFERENCE POINT SESSION DOSAGE GIVEN: 1.8 GY
RAD ONC ARIA REFERENCE POINT SESSION DOSAGE GIVEN: 2.5 GY
RAD ONC ARIA REFERENCE POINT SESSION DOSAGE GIVEN: 2.62 GY

## 2023-10-30 RX ORDER — ONDANSETRON 4 MG/1
4 TABLET, ORALLY DISINTEGRATING ORAL 3 TIMES DAILY PRN
Qty: 30 TABLET | Refills: 0 | Status: SHIPPED | OUTPATIENT
Start: 2023-10-30

## 2023-10-30 ASSESSMENT — PAIN SCALES - GENERAL: PAINLEVEL_OUTOF10: 0

## 2023-10-30 NOTE — PROGRESS NOTES
daily, Disp: , Rfl:     doxazosin (CARDURA) 1 MG tablet, Take 1 tablet by mouth daily, Disp: , Rfl:     flecainide (TAMBOCOR) 50 MG tablet, Take 1 tablet by mouth 2 times daily, Disp: , Rfl:     lisinopril (PRINIVIL;ZESTRIL) 20 MG tablet, Take 1 tablet by mouth daily (Patient not taking: Reported on 8/23/2023), Disp: , Rfl:     magnesium oxide (MAG-OX) 400 MG tablet, Take 1 tablet by mouth daily, Disp: , Rfl:     metoprolol succinate (TOPROL XL) 100 MG extended release tablet, Take 1 tablet by mouth, Disp: , Rfl:     potassium chloride (KLOR-CON) 10 MEQ extended release tablet, Take by mouth, Disp: , Rfl:     predniSONE (DELTASONE) 5 MG tablet, Take 1 mg by mouth daily, Disp: , Rfl:     tamsulosin (FLOMAX) 0.4 MG capsule, Take 1 capsule by mouth daily, Disp: , Rfl:       ASSESSMENT/PLAN:   - Continue radiation treatment as planned  - Send 0.4mg zofran to pharmacy  - Treatment setup and plan reviewed. Port images/CBCT images reviewed. Appropriate laboratory work was reviewed. - Treatment side effects and toxicities reviewed with the patient, and appropriate management was advised.          Robbie Gifford MD  [facility]  [address]  P: [address] [place] [address]  P: 20 [facility]  [address] [facility], [address]  P: [phone]

## 2023-10-31 ENCOUNTER — HOSPITAL ENCOUNTER (OUTPATIENT)
Facility: HOSPITAL | Age: 68
Discharge: HOME | End: 2023-11-03
Attending: RADIOLOGY

## 2023-10-31 LAB
RAD ONC ARIA COURSE FIRST TREATMENT DATE: NORMAL
RAD ONC ARIA COURSE ID: NORMAL
RAD ONC ARIA COURSE LAST TREATMENT DATE: NORMAL
RAD ONC ARIA COURSE SESSION NUMBER: 12
RAD ONC ARIA COURSE TREATMENT ELAPSED DAYS: 15
RAD ONC ARIA PLAN FRACTIONS TREATED TO DATE: 12
RAD ONC ARIA PLAN ID: NORMAL
RAD ONC ARIA PLAN PRESCRIBED DOSE PER FRACTION: 2.5 GY
RAD ONC ARIA PLAN PRIMARY REFERENCE POINT: NORMAL
RAD ONC ARIA PLAN TOTAL FRACTIONS PRESCRIBED: 28
RAD ONC ARIA PLAN TOTAL PRESCRIBED DOSE: 7000 CGY
RAD ONC ARIA REFERENCE POINT DOSAGE GIVEN TO DATE: 21.6 GY
RAD ONC ARIA REFERENCE POINT DOSAGE GIVEN TO DATE: 30 GY
RAD ONC ARIA REFERENCE POINT DOSAGE GIVEN TO DATE: 31.45 GY
RAD ONC ARIA REFERENCE POINT ID: NORMAL
RAD ONC ARIA REFERENCE POINT SESSION DOSAGE GIVEN: 1.8 GY
RAD ONC ARIA REFERENCE POINT SESSION DOSAGE GIVEN: 2.5 GY
RAD ONC ARIA REFERENCE POINT SESSION DOSAGE GIVEN: 2.62 GY

## 2023-11-01 ENCOUNTER — HOSPITAL ENCOUNTER (OUTPATIENT)
Facility: HOSPITAL | Age: 68
Discharge: HOME | End: 2023-11-04
Attending: RADIOLOGY

## 2023-11-01 LAB
RAD ONC ARIA COURSE FIRST TREATMENT DATE: NORMAL
RAD ONC ARIA COURSE ID: NORMAL
RAD ONC ARIA COURSE LAST TREATMENT DATE: NORMAL
RAD ONC ARIA COURSE SESSION NUMBER: 13
RAD ONC ARIA COURSE TREATMENT ELAPSED DAYS: 16
RAD ONC ARIA PLAN FRACTIONS TREATED TO DATE: 13
RAD ONC ARIA PLAN ID: NORMAL
RAD ONC ARIA PLAN PRESCRIBED DOSE PER FRACTION: 2.5 GY
RAD ONC ARIA PLAN PRIMARY REFERENCE POINT: NORMAL
RAD ONC ARIA PLAN TOTAL FRACTIONS PRESCRIBED: 28
RAD ONC ARIA PLAN TOTAL PRESCRIBED DOSE: 7000 CGY
RAD ONC ARIA REFERENCE POINT DOSAGE GIVEN TO DATE: 23.4 GY
RAD ONC ARIA REFERENCE POINT DOSAGE GIVEN TO DATE: 32.5 GY
RAD ONC ARIA REFERENCE POINT DOSAGE GIVEN TO DATE: 34.07 GY
RAD ONC ARIA REFERENCE POINT ID: NORMAL
RAD ONC ARIA REFERENCE POINT SESSION DOSAGE GIVEN: 1.8 GY
RAD ONC ARIA REFERENCE POINT SESSION DOSAGE GIVEN: 2.5 GY
RAD ONC ARIA REFERENCE POINT SESSION DOSAGE GIVEN: 2.62 GY

## 2023-11-02 ENCOUNTER — HOSPITAL ENCOUNTER (OUTPATIENT)
Facility: HOSPITAL | Age: 68
Discharge: HOME | End: 2023-11-05
Attending: RADIOLOGY

## 2023-11-02 LAB
RAD ONC ARIA COURSE FIRST TREATMENT DATE: NORMAL
RAD ONC ARIA COURSE ID: NORMAL
RAD ONC ARIA COURSE LAST TREATMENT DATE: NORMAL
RAD ONC ARIA COURSE SESSION NUMBER: 14
RAD ONC ARIA COURSE TREATMENT ELAPSED DAYS: 17
RAD ONC ARIA PLAN FRACTIONS TREATED TO DATE: 14
RAD ONC ARIA PLAN ID: NORMAL
RAD ONC ARIA PLAN PRESCRIBED DOSE PER FRACTION: 2.5 GY
RAD ONC ARIA PLAN PRIMARY REFERENCE POINT: NORMAL
RAD ONC ARIA PLAN TOTAL FRACTIONS PRESCRIBED: 28
RAD ONC ARIA PLAN TOTAL PRESCRIBED DOSE: 7000 CGY
RAD ONC ARIA REFERENCE POINT DOSAGE GIVEN TO DATE: 25.2 GY
RAD ONC ARIA REFERENCE POINT DOSAGE GIVEN TO DATE: 35 GY
RAD ONC ARIA REFERENCE POINT DOSAGE GIVEN TO DATE: 36.69 GY
RAD ONC ARIA REFERENCE POINT ID: NORMAL
RAD ONC ARIA REFERENCE POINT SESSION DOSAGE GIVEN: 1.8 GY
RAD ONC ARIA REFERENCE POINT SESSION DOSAGE GIVEN: 2.5 GY
RAD ONC ARIA REFERENCE POINT SESSION DOSAGE GIVEN: 2.62 GY

## 2023-11-03 ENCOUNTER — HOSPITAL ENCOUNTER (OUTPATIENT)
Facility: HOSPITAL | Age: 68
Discharge: HOME | End: 2023-11-06
Attending: RADIOLOGY

## 2023-11-03 LAB
RAD ONC ARIA COURSE FIRST TREATMENT DATE: NORMAL
RAD ONC ARIA COURSE ID: NORMAL
RAD ONC ARIA COURSE LAST TREATMENT DATE: NORMAL
RAD ONC ARIA COURSE SESSION NUMBER: 15
RAD ONC ARIA COURSE TREATMENT ELAPSED DAYS: 18
RAD ONC ARIA PLAN FRACTIONS TREATED TO DATE: 15
RAD ONC ARIA PLAN ID: NORMAL
RAD ONC ARIA PLAN PRESCRIBED DOSE PER FRACTION: 2.5 GY
RAD ONC ARIA PLAN PRIMARY REFERENCE POINT: NORMAL
RAD ONC ARIA PLAN TOTAL FRACTIONS PRESCRIBED: 28
RAD ONC ARIA PLAN TOTAL PRESCRIBED DOSE: 7000 CGY
RAD ONC ARIA REFERENCE POINT DOSAGE GIVEN TO DATE: 27 GY
RAD ONC ARIA REFERENCE POINT DOSAGE GIVEN TO DATE: 37.5 GY
RAD ONC ARIA REFERENCE POINT DOSAGE GIVEN TO DATE: 39.31 GY
RAD ONC ARIA REFERENCE POINT ID: NORMAL
RAD ONC ARIA REFERENCE POINT SESSION DOSAGE GIVEN: 1.8 GY
RAD ONC ARIA REFERENCE POINT SESSION DOSAGE GIVEN: 2.5 GY
RAD ONC ARIA REFERENCE POINT SESSION DOSAGE GIVEN: 2.62 GY

## 2023-11-06 ENCOUNTER — HOSPITAL ENCOUNTER (OUTPATIENT)
Facility: HOSPITAL | Age: 68
Discharge: HOME | End: 2023-11-09
Attending: RADIOLOGY

## 2023-11-06 DIAGNOSIS — C61 PROSTATE CANCER (HCC): Primary | ICD-10-CM

## 2023-11-06 LAB
RAD ONC ARIA COURSE FIRST TREATMENT DATE: NORMAL
RAD ONC ARIA COURSE ID: NORMAL
RAD ONC ARIA COURSE LAST TREATMENT DATE: NORMAL
RAD ONC ARIA COURSE SESSION NUMBER: 16
RAD ONC ARIA COURSE TREATMENT ELAPSED DAYS: 21
RAD ONC ARIA PLAN FRACTIONS TREATED TO DATE: 16
RAD ONC ARIA PLAN ID: NORMAL
RAD ONC ARIA PLAN PRESCRIBED DOSE PER FRACTION: 2.5 GY
RAD ONC ARIA PLAN PRIMARY REFERENCE POINT: NORMAL
RAD ONC ARIA PLAN TOTAL FRACTIONS PRESCRIBED: 28
RAD ONC ARIA PLAN TOTAL PRESCRIBED DOSE: 7000 CGY
RAD ONC ARIA REFERENCE POINT DOSAGE GIVEN TO DATE: 28.8 GY
RAD ONC ARIA REFERENCE POINT DOSAGE GIVEN TO DATE: 40 GY
RAD ONC ARIA REFERENCE POINT DOSAGE GIVEN TO DATE: 41.93 GY
RAD ONC ARIA REFERENCE POINT ID: NORMAL
RAD ONC ARIA REFERENCE POINT SESSION DOSAGE GIVEN: 1.8 GY
RAD ONC ARIA REFERENCE POINT SESSION DOSAGE GIVEN: 2.5 GY
RAD ONC ARIA REFERENCE POINT SESSION DOSAGE GIVEN: 2.62 GY

## 2023-11-06 ASSESSMENT — PAIN SCALES - GENERAL: PAINLEVEL_OUTOF10: 0

## 2023-11-06 NOTE — PROGRESS NOTES
[facility]cer [place] [facility]  [facility]      RADIATION ONCOLOGY WEEKLY PROGRESS NOTE    Encounter Date: 11/06/23   Patient Name: Stephanie Bradford  YOB: 1955  Medical Record Number: [id number]    DIAGNOSIS:     ICD-10-CM    1. Prostate cancer ([address])  C61          STAGING:  Cancer Staging   Malignant neoplasm of prostate [facility])  Staging form: Prostate, AJCC 8th Edition  - Clinical stage from 7/13/2023: Stage IIIC (cT2b, cN0, cM0, PSA: 7.7, Grade Group: 5) - Signed by Kj Tong MD on 10/23/2023      TREATMENT DETAILS:  Treatment Site Dose/Fx (cGy) #Fx Current Dose (cGy) Total Planned Dose (cGy) Start Date End Date   Prostate/SV + pelvic  16 / 28 4000 7000 10/16/23 11/6/23     Concurrent ADT + xtandi      SUBJECTIVE:   Patient seen today for their weekly on treatment evaluation. At fraction 16 doing well. Overall stable /GI symptoms. Was taking azo, then stopped. Now somewhat increased dysuria. Nausea controlled with zofran. Energy level fair, hot flashes seemed more intense over the last week. ADT started with orgovyx on 10/10/23 along with Dara Maxwell. OBJECTIVE/PHYSICAL EXAM:   VITAL SIGNS: There were no vitals taken for this visit.   Wt Readings from Last 5 Encounters:   08/23/23 88.9 kg (196 lb)   11/17/21 90.7 kg (200 lb)   Pain Level: 0  Performance status:  ECOG 0, fully active, able to carry on all predisease activities without restrictions  General: Alert and conversant, in NAD      LABS:  Personally reviewed    MEDICATIONS:    Current Outpatient Medications:     ondansetron (ZOFRAN-ODT) 4 MG disintegrating tablet, Take 1 tablet by mouth 3 times daily as needed for Nausea, Disp: 30 tablet, Rfl: 0    amLODIPine (NORVASC) 10 MG tablet, Take 1 tablet by mouth, Disp: , Rfl:     apixaban (ELIQUIS) 5 MG TABS tablet, Take 1 tablet by mouth 2 times daily (Patient not taking: Reported on 8/23/2023), Disp: , Rfl:     celecoxib (CELEBREX) 200

## 2023-11-07 ENCOUNTER — HOSPITAL ENCOUNTER (OUTPATIENT)
Facility: HOSPITAL | Age: 68
Discharge: HOME | End: 2023-11-10
Attending: RADIOLOGY

## 2023-11-07 LAB
RAD ONC ARIA COURSE FIRST TREATMENT DATE: NORMAL
RAD ONC ARIA COURSE ID: NORMAL
RAD ONC ARIA COURSE LAST TREATMENT DATE: NORMAL
RAD ONC ARIA COURSE SESSION NUMBER: 17
RAD ONC ARIA COURSE TREATMENT ELAPSED DAYS: 22
RAD ONC ARIA PLAN FRACTIONS TREATED TO DATE: 17
RAD ONC ARIA PLAN ID: NORMAL
RAD ONC ARIA PLAN PRESCRIBED DOSE PER FRACTION: 2.5 GY
RAD ONC ARIA PLAN PRIMARY REFERENCE POINT: NORMAL
RAD ONC ARIA PLAN TOTAL FRACTIONS PRESCRIBED: 28
RAD ONC ARIA PLAN TOTAL PRESCRIBED DOSE: 7000 CGY
RAD ONC ARIA REFERENCE POINT DOSAGE GIVEN TO DATE: 30.6 GY
RAD ONC ARIA REFERENCE POINT DOSAGE GIVEN TO DATE: 42.5 GY
RAD ONC ARIA REFERENCE POINT DOSAGE GIVEN TO DATE: 44.55 GY
RAD ONC ARIA REFERENCE POINT ID: NORMAL
RAD ONC ARIA REFERENCE POINT SESSION DOSAGE GIVEN: 1.8 GY
RAD ONC ARIA REFERENCE POINT SESSION DOSAGE GIVEN: 2.5 GY
RAD ONC ARIA REFERENCE POINT SESSION DOSAGE GIVEN: 2.62 GY

## 2023-11-08 ENCOUNTER — HOSPITAL ENCOUNTER (OUTPATIENT)
Facility: HOSPITAL | Age: 68
Discharge: HOME | End: 2023-11-11
Attending: RADIOLOGY

## 2023-11-08 LAB
RAD ONC ARIA COURSE FIRST TREATMENT DATE: NORMAL
RAD ONC ARIA COURSE ID: NORMAL
RAD ONC ARIA COURSE LAST TREATMENT DATE: NORMAL
RAD ONC ARIA COURSE SESSION NUMBER: 18
RAD ONC ARIA COURSE TREATMENT ELAPSED DAYS: 23
RAD ONC ARIA PLAN FRACTIONS TREATED TO DATE: 18
RAD ONC ARIA PLAN ID: NORMAL
RAD ONC ARIA PLAN PRESCRIBED DOSE PER FRACTION: 2.5 GY
RAD ONC ARIA PLAN PRIMARY REFERENCE POINT: NORMAL
RAD ONC ARIA PLAN TOTAL FRACTIONS PRESCRIBED: 28
RAD ONC ARIA PLAN TOTAL PRESCRIBED DOSE: 7000 CGY
RAD ONC ARIA REFERENCE POINT DOSAGE GIVEN TO DATE: 32.4 GY
RAD ONC ARIA REFERENCE POINT DOSAGE GIVEN TO DATE: 45 GY
RAD ONC ARIA REFERENCE POINT DOSAGE GIVEN TO DATE: 47.17 GY
RAD ONC ARIA REFERENCE POINT ID: NORMAL
RAD ONC ARIA REFERENCE POINT SESSION DOSAGE GIVEN: 1.8 GY
RAD ONC ARIA REFERENCE POINT SESSION DOSAGE GIVEN: 2.5 GY
RAD ONC ARIA REFERENCE POINT SESSION DOSAGE GIVEN: 2.62 GY

## 2023-11-09 ENCOUNTER — HOSPITAL ENCOUNTER (OUTPATIENT)
Facility: HOSPITAL | Age: 68
Discharge: HOME | End: 2023-11-12
Attending: RADIOLOGY

## 2023-11-09 LAB
RAD ONC ARIA COURSE FIRST TREATMENT DATE: NORMAL
RAD ONC ARIA COURSE ID: NORMAL
RAD ONC ARIA COURSE LAST TREATMENT DATE: NORMAL
RAD ONC ARIA COURSE SESSION NUMBER: 19
RAD ONC ARIA COURSE TREATMENT ELAPSED DAYS: 24
RAD ONC ARIA PLAN FRACTIONS TREATED TO DATE: 19
RAD ONC ARIA PLAN ID: NORMAL
RAD ONC ARIA PLAN PRESCRIBED DOSE PER FRACTION: 2.5 GY
RAD ONC ARIA PLAN PRIMARY REFERENCE POINT: NORMAL
RAD ONC ARIA PLAN TOTAL FRACTIONS PRESCRIBED: 28
RAD ONC ARIA PLAN TOTAL PRESCRIBED DOSE: 7000 CGY
RAD ONC ARIA REFERENCE POINT DOSAGE GIVEN TO DATE: 34.2 GY
RAD ONC ARIA REFERENCE POINT DOSAGE GIVEN TO DATE: 47.5 GY
RAD ONC ARIA REFERENCE POINT DOSAGE GIVEN TO DATE: 49.79 GY
RAD ONC ARIA REFERENCE POINT ID: NORMAL
RAD ONC ARIA REFERENCE POINT SESSION DOSAGE GIVEN: 1.8 GY
RAD ONC ARIA REFERENCE POINT SESSION DOSAGE GIVEN: 2.5 GY
RAD ONC ARIA REFERENCE POINT SESSION DOSAGE GIVEN: 2.62 GY

## 2023-11-10 ENCOUNTER — HOSPITAL ENCOUNTER (OUTPATIENT)
Facility: HOSPITAL | Age: 68
Discharge: HOME | End: 2023-11-13
Attending: RADIOLOGY

## 2023-11-10 LAB
RAD ONC ARIA COURSE FIRST TREATMENT DATE: NORMAL
RAD ONC ARIA COURSE ID: NORMAL
RAD ONC ARIA COURSE LAST TREATMENT DATE: NORMAL
RAD ONC ARIA COURSE SESSION NUMBER: 20
RAD ONC ARIA COURSE TREATMENT ELAPSED DAYS: 25
RAD ONC ARIA PLAN FRACTIONS TREATED TO DATE: 20
RAD ONC ARIA PLAN ID: NORMAL
RAD ONC ARIA PLAN PRESCRIBED DOSE PER FRACTION: 2.5 GY
RAD ONC ARIA PLAN PRIMARY REFERENCE POINT: NORMAL
RAD ONC ARIA PLAN TOTAL FRACTIONS PRESCRIBED: 28
RAD ONC ARIA PLAN TOTAL PRESCRIBED DOSE: 7000 CGY
RAD ONC ARIA REFERENCE POINT DOSAGE GIVEN TO DATE: 36 GY
RAD ONC ARIA REFERENCE POINT DOSAGE GIVEN TO DATE: 50 GY
RAD ONC ARIA REFERENCE POINT DOSAGE GIVEN TO DATE: 52.41 GY
RAD ONC ARIA REFERENCE POINT ID: NORMAL
RAD ONC ARIA REFERENCE POINT SESSION DOSAGE GIVEN: 1.8 GY
RAD ONC ARIA REFERENCE POINT SESSION DOSAGE GIVEN: 2.5 GY
RAD ONC ARIA REFERENCE POINT SESSION DOSAGE GIVEN: 2.62 GY

## 2023-11-13 ENCOUNTER — HOSPITAL ENCOUNTER (OUTPATIENT)
Facility: HOSPITAL | Age: 68
Discharge: HOME | End: 2023-11-16
Attending: RADIOLOGY

## 2023-11-13 DIAGNOSIS — C61 PROSTATE CANCER (HCC): Primary | ICD-10-CM

## 2023-11-13 LAB
RAD ONC ARIA COURSE FIRST TREATMENT DATE: NORMAL
RAD ONC ARIA COURSE ID: NORMAL
RAD ONC ARIA COURSE LAST TREATMENT DATE: NORMAL
RAD ONC ARIA COURSE SESSION NUMBER: 21
RAD ONC ARIA COURSE TREATMENT ELAPSED DAYS: 28
RAD ONC ARIA PLAN FRACTIONS TREATED TO DATE: 21
RAD ONC ARIA PLAN ID: NORMAL
RAD ONC ARIA PLAN PRESCRIBED DOSE PER FRACTION: 2.5 GY
RAD ONC ARIA PLAN PRIMARY REFERENCE POINT: NORMAL
RAD ONC ARIA PLAN TOTAL FRACTIONS PRESCRIBED: 28
RAD ONC ARIA PLAN TOTAL PRESCRIBED DOSE: 7000 CGY
RAD ONC ARIA REFERENCE POINT DOSAGE GIVEN TO DATE: 37.8 GY
RAD ONC ARIA REFERENCE POINT DOSAGE GIVEN TO DATE: 52.5 GY
RAD ONC ARIA REFERENCE POINT DOSAGE GIVEN TO DATE: 55.03 GY
RAD ONC ARIA REFERENCE POINT ID: NORMAL
RAD ONC ARIA REFERENCE POINT SESSION DOSAGE GIVEN: 0.57 GY
RAD ONC ARIA REFERENCE POINT SESSION DOSAGE GIVEN: 0.84 GY
RAD ONC ARIA REFERENCE POINT SESSION DOSAGE GIVEN: 0.97 GY

## 2023-11-13 RX ORDER — ENZALUTAMIDE 40 MG/1
TABLET ORAL
COMMUNITY
Start: 2023-11-08

## 2023-11-13 ASSESSMENT — PAIN SCALES - GENERAL: PAINLEVEL_OUTOF10: 0

## 2023-11-13 NOTE — PROGRESS NOTES
Cancer [place] [facility]  [facility]      RADIATION ONCOLOGY WEEKLY PROGRESS NOTE    Encounter Date: 11/13/23   Patient Name: Greta Mendes  YOB: 1955  Medical Record Number: [id number]    DIAGNOSIS:     ICD-10-CM    1. Prostate cancer ([address])  C61          STAGING:  Cancer Staging   Malignant neoplasm of prostate [facility])  Staging form: Prostate, AJCC 8th Edition  - Clinical stage from 7/13/2023: Stage IIIC (cT2b, cN0, cM0, PSA: 7.7, Grade Group: 5) - Signed by Chleo Morrow MD on 10/23/2023      TREATMENT DETAILS:  Treatment Site Dose/Fx (cGy) #Fx Current Dose (cGy) Total Planned Dose (cGy) Start Date End Date   Prostate/SV + pelvic  21 / 28 5250 7000 10/16/23 11/13/23     Concurrent ADT + xtandi      SUBJECTIVE:   Patient seen today for their weekly on treatment evaluation. At fraction 21 doing well. Having more loose stools/diarrhea over the last few days. Will take imodium prn which helps.  function stable, taking azo which helps dysuria. Nausea controlled with zofran. Energy level fair, hot flashes seemed more intense over the last week. ADT started with orgovyx on 10/10/23 along with Miranda Duel. OBJECTIVE/PHYSICAL EXAM:   VITAL SIGNS: There were no vitals taken for this visit.   Wt Readings from Last 5 Encounters:   08/23/23 88.9 kg (196 lb)   11/17/21 90.7 kg (200 lb)   Pain Level: 0  Performance status:  ECOG 0, fully active, able to carry on all predisease activities without restrictions  General: Alert and conversant, in NAD      LABS:  Personally reviewed    MEDICATIONS:    Current Outpatient Medications:     XTANDI 40 MG TABS, , Disp: , Rfl:     ondansetron (ZOFRAN-ODT) 4 MG disintegrating tablet, Take 1 tablet by mouth 3 times daily as needed for Nausea, Disp: 30 tablet, Rfl: 0    amLODIPine (NORVASC) 10 MG tablet, Take 1 tablet by mouth, Disp: , Rfl:     apixaban (ELIQUIS) 5 MG TABS tablet, Take 1 tablet by mouth 2 times

## 2023-11-14 ENCOUNTER — HOSPITAL ENCOUNTER (OUTPATIENT)
Facility: HOSPITAL | Age: 68
Discharge: HOME | End: 2023-11-17
Attending: RADIOLOGY

## 2023-11-14 LAB
RAD ONC ARIA COURSE FIRST TREATMENT DATE: NORMAL
RAD ONC ARIA COURSE ID: NORMAL
RAD ONC ARIA COURSE LAST TREATMENT DATE: NORMAL
RAD ONC ARIA COURSE SESSION NUMBER: 22
RAD ONC ARIA COURSE TREATMENT ELAPSED DAYS: 29
RAD ONC ARIA PLAN FRACTIONS TREATED TO DATE: 22
RAD ONC ARIA PLAN ID: NORMAL
RAD ONC ARIA PLAN PRESCRIBED DOSE PER FRACTION: 2.5 GY
RAD ONC ARIA PLAN PRIMARY REFERENCE POINT: NORMAL
RAD ONC ARIA PLAN TOTAL FRACTIONS PRESCRIBED: 28
RAD ONC ARIA PLAN TOTAL PRESCRIBED DOSE: 7000 CGY
RAD ONC ARIA REFERENCE POINT DOSAGE GIVEN TO DATE: 39.6 GY
RAD ONC ARIA REFERENCE POINT DOSAGE GIVEN TO DATE: 55 GY
RAD ONC ARIA REFERENCE POINT DOSAGE GIVEN TO DATE: 57.66 GY
RAD ONC ARIA REFERENCE POINT ID: NORMAL
RAD ONC ARIA REFERENCE POINT SESSION DOSAGE GIVEN: 1.8 GY
RAD ONC ARIA REFERENCE POINT SESSION DOSAGE GIVEN: 2.5 GY
RAD ONC ARIA REFERENCE POINT SESSION DOSAGE GIVEN: 2.62 GY

## 2023-11-15 ENCOUNTER — HOSPITAL ENCOUNTER (OUTPATIENT)
Facility: HOSPITAL | Age: 68
Discharge: HOME | End: 2023-11-18
Attending: RADIOLOGY

## 2023-11-15 LAB
RAD ONC ARIA COURSE FIRST TREATMENT DATE: NORMAL
RAD ONC ARIA COURSE ID: NORMAL
RAD ONC ARIA COURSE LAST TREATMENT DATE: NORMAL
RAD ONC ARIA COURSE SESSION NUMBER: 23
RAD ONC ARIA COURSE TREATMENT ELAPSED DAYS: 30
RAD ONC ARIA PLAN FRACTIONS TREATED TO DATE: 23
RAD ONC ARIA PLAN ID: NORMAL
RAD ONC ARIA PLAN PRESCRIBED DOSE PER FRACTION: 2.5 GY
RAD ONC ARIA PLAN PRIMARY REFERENCE POINT: NORMAL
RAD ONC ARIA PLAN TOTAL FRACTIONS PRESCRIBED: 28
RAD ONC ARIA PLAN TOTAL PRESCRIBED DOSE: 7000 CGY
RAD ONC ARIA REFERENCE POINT DOSAGE GIVEN TO DATE: 41.4 GY
RAD ONC ARIA REFERENCE POINT DOSAGE GIVEN TO DATE: 57.5 GY
RAD ONC ARIA REFERENCE POINT DOSAGE GIVEN TO DATE: 60.28 GY
RAD ONC ARIA REFERENCE POINT ID: NORMAL
RAD ONC ARIA REFERENCE POINT SESSION DOSAGE GIVEN: 1.8 GY
RAD ONC ARIA REFERENCE POINT SESSION DOSAGE GIVEN: 2.5 GY
RAD ONC ARIA REFERENCE POINT SESSION DOSAGE GIVEN: 2.62 GY

## 2023-11-16 ENCOUNTER — HOSPITAL ENCOUNTER (OUTPATIENT)
Facility: HOSPITAL | Age: 68
Discharge: HOME | End: 2023-11-19
Attending: RADIOLOGY

## 2023-11-16 LAB
RAD ONC ARIA COURSE FIRST TREATMENT DATE: NORMAL
RAD ONC ARIA COURSE ID: NORMAL
RAD ONC ARIA COURSE LAST TREATMENT DATE: NORMAL
RAD ONC ARIA COURSE SESSION NUMBER: 24
RAD ONC ARIA COURSE TREATMENT ELAPSED DAYS: 31
RAD ONC ARIA PLAN FRACTIONS TREATED TO DATE: 24
RAD ONC ARIA PLAN ID: NORMAL
RAD ONC ARIA PLAN PRESCRIBED DOSE PER FRACTION: 2.5 GY
RAD ONC ARIA PLAN PRIMARY REFERENCE POINT: NORMAL
RAD ONC ARIA PLAN TOTAL FRACTIONS PRESCRIBED: 28
RAD ONC ARIA PLAN TOTAL PRESCRIBED DOSE: 7000 CGY
RAD ONC ARIA REFERENCE POINT DOSAGE GIVEN TO DATE: 43.2 GY
RAD ONC ARIA REFERENCE POINT DOSAGE GIVEN TO DATE: 60 GY
RAD ONC ARIA REFERENCE POINT DOSAGE GIVEN TO DATE: 62.9 GY
RAD ONC ARIA REFERENCE POINT ID: NORMAL
RAD ONC ARIA REFERENCE POINT SESSION DOSAGE GIVEN: 1.8 GY
RAD ONC ARIA REFERENCE POINT SESSION DOSAGE GIVEN: 2.5 GY
RAD ONC ARIA REFERENCE POINT SESSION DOSAGE GIVEN: 2.62 GY

## 2023-11-17 ENCOUNTER — HOSPITAL ENCOUNTER (OUTPATIENT)
Facility: HOSPITAL | Age: 68
Discharge: HOME | End: 2023-11-20
Attending: RADIOLOGY

## 2023-11-17 LAB
RAD ONC ARIA COURSE FIRST TREATMENT DATE: NORMAL
RAD ONC ARIA COURSE ID: NORMAL
RAD ONC ARIA COURSE LAST TREATMENT DATE: NORMAL
RAD ONC ARIA COURSE SESSION NUMBER: 25
RAD ONC ARIA COURSE TREATMENT ELAPSED DAYS: 32
RAD ONC ARIA PLAN FRACTIONS TREATED TO DATE: 25
RAD ONC ARIA PLAN ID: NORMAL
RAD ONC ARIA PLAN PRESCRIBED DOSE PER FRACTION: 2.5 GY
RAD ONC ARIA PLAN PRIMARY REFERENCE POINT: NORMAL
RAD ONC ARIA PLAN TOTAL FRACTIONS PRESCRIBED: 28
RAD ONC ARIA PLAN TOTAL PRESCRIBED DOSE: 7000 CGY
RAD ONC ARIA REFERENCE POINT DOSAGE GIVEN TO DATE: 45 GY
RAD ONC ARIA REFERENCE POINT DOSAGE GIVEN TO DATE: 62.5 GY
RAD ONC ARIA REFERENCE POINT DOSAGE GIVEN TO DATE: 65.52 GY
RAD ONC ARIA REFERENCE POINT ID: NORMAL
RAD ONC ARIA REFERENCE POINT SESSION DOSAGE GIVEN: 1.8 GY
RAD ONC ARIA REFERENCE POINT SESSION DOSAGE GIVEN: 2.5 GY
RAD ONC ARIA REFERENCE POINT SESSION DOSAGE GIVEN: 2.62 GY

## 2023-11-20 ENCOUNTER — HOSPITAL ENCOUNTER (OUTPATIENT)
Facility: HOSPITAL | Age: 68
Discharge: HOME | End: 2023-11-23
Attending: RADIOLOGY

## 2023-11-20 DIAGNOSIS — C61 PROSTATE CANCER (HCC): Primary | ICD-10-CM

## 2023-11-20 DIAGNOSIS — C61 PROSTATE CANCER (HCC): ICD-10-CM

## 2023-11-20 DIAGNOSIS — R11.0 NAUSEA: ICD-10-CM

## 2023-11-20 LAB
RAD ONC ARIA COURSE FIRST TREATMENT DATE: NORMAL
RAD ONC ARIA COURSE ID: NORMAL
RAD ONC ARIA COURSE LAST TREATMENT DATE: NORMAL
RAD ONC ARIA COURSE SESSION NUMBER: 26
RAD ONC ARIA COURSE TREATMENT ELAPSED DAYS: 35
RAD ONC ARIA PLAN FRACTIONS TREATED TO DATE: 26
RAD ONC ARIA PLAN ID: NORMAL
RAD ONC ARIA PLAN PRESCRIBED DOSE PER FRACTION: 2.5 GY
RAD ONC ARIA PLAN PRIMARY REFERENCE POINT: NORMAL
RAD ONC ARIA PLAN TOTAL FRACTIONS PRESCRIBED: 28
RAD ONC ARIA PLAN TOTAL PRESCRIBED DOSE: 7000 CGY
RAD ONC ARIA REFERENCE POINT DOSAGE GIVEN TO DATE: 46.8 GY
RAD ONC ARIA REFERENCE POINT DOSAGE GIVEN TO DATE: 65 GY
RAD ONC ARIA REFERENCE POINT DOSAGE GIVEN TO DATE: 68.14 GY
RAD ONC ARIA REFERENCE POINT ID: NORMAL
RAD ONC ARIA REFERENCE POINT SESSION DOSAGE GIVEN: 1.8 GY
RAD ONC ARIA REFERENCE POINT SESSION DOSAGE GIVEN: 2.5 GY
RAD ONC ARIA REFERENCE POINT SESSION DOSAGE GIVEN: 2.62 GY

## 2023-11-20 RX ORDER — ONDANSETRON 4 MG/1
4 TABLET, ORALLY DISINTEGRATING ORAL 3 TIMES DAILY PRN
Qty: 30 TABLET | Refills: 0 | Status: SHIPPED | OUTPATIENT
Start: 2023-11-20

## 2023-11-20 ASSESSMENT — PAIN SCALES - GENERAL: PAINLEVEL_OUTOF10: 0

## 2023-11-20 NOTE — PROGRESS NOTES
[facility]cer [facility] at [address] [place] [address]  [facility]      RADIATION ONCOLOGY WEEKLY PROGRESS NOTE    Encounter Date: 11/20/23   Patient Name: Pilo Urrutia  YOB: 1955  Medical Record Number: [id number]    DIAGNOSIS:     ICD-10-CM    1. Prostate cancer ([address])  C61          STAGING:  Cancer Staging   Malignant neoplasm of prostate [facility])  Staging form: Prostate, AJCC 8th Edition  - Clinical stage from 7/13/2023: Stage IIIC (cT2b, cN0, cM0, PSA: 7.7, Grade Group: 5) - Signed by Brandi Vergara MD on 10/23/2023      TREATMENT DETAILS:  Treatment Site Dose/Fx (cGy) #Fx Current Dose (cGy) Total Planned Dose (cGy) Start Date End Date   Prostate/SV + pelvic  26 / 28 6500 7000 10/16/23 11/20/23     Concurrent ADT + xtandi      SUBJECTIVE:   Patient seen today for their weekly on treatment evaluation. At fraction 26 with stable /GI symptoms. Continues to have moderate dysuria, controlled with azo. Also increased GI frequency controlled with imodium. Mild nausea occasionally and takes zofran prn. Energy level low, hot flashes stable. ADT started with orgovyx on 10/10/23 along with Kezia Jama. OBJECTIVE/PHYSICAL EXAM:   VITAL SIGNS: There were no vitals taken for this visit.   Wt Readings from Last 5 Encounters:   08/23/23 88.9 kg (196 lb)   11/17/21 90.7 kg (200 lb)   Pain Level: 0  Performance status:  ECOG 0, fully active, able to carry on all predisease activities without restrictions  General: Alert and conversant, in NAD      LABS:  Personally reviewed    MEDICATIONS:    Current Outpatient Medications:     XTANDI 40 MG TABS, , Disp: , Rfl:     ondansetron (ZOFRAN-ODT) 4 MG disintegrating tablet, Take 1 tablet by mouth 3 times daily as needed for Nausea, Disp: 30 tablet, Rfl: 0    amLODIPine (NORVASC) 10 MG tablet, Take 1 tablet by mouth, Disp: , Rfl:     apixaban (ELIQUIS) 5 MG TABS tablet, Take 1 tablet by mouth 2 times daily (Patient not taking: Reported on

## 2023-11-21 ENCOUNTER — HOSPITAL ENCOUNTER (OUTPATIENT)
Facility: HOSPITAL | Age: 68
Discharge: HOME | End: 2023-11-24
Attending: RADIOLOGY

## 2023-11-21 LAB
RAD ONC ARIA COURSE FIRST TREATMENT DATE: NORMAL
RAD ONC ARIA COURSE ID: NORMAL
RAD ONC ARIA COURSE LAST TREATMENT DATE: NORMAL
RAD ONC ARIA COURSE SESSION NUMBER: 27
RAD ONC ARIA COURSE TREATMENT ELAPSED DAYS: 36
RAD ONC ARIA PLAN FRACTIONS TREATED TO DATE: 27
RAD ONC ARIA PLAN ID: NORMAL
RAD ONC ARIA PLAN PRESCRIBED DOSE PER FRACTION: 2.5 GY
RAD ONC ARIA PLAN PRIMARY REFERENCE POINT: NORMAL
RAD ONC ARIA PLAN TOTAL FRACTIONS PRESCRIBED: 28
RAD ONC ARIA PLAN TOTAL PRESCRIBED DOSE: 7000 CGY
RAD ONC ARIA REFERENCE POINT DOSAGE GIVEN TO DATE: 48.6 GY
RAD ONC ARIA REFERENCE POINT DOSAGE GIVEN TO DATE: 67.5 GY
RAD ONC ARIA REFERENCE POINT DOSAGE GIVEN TO DATE: 70.76 GY
RAD ONC ARIA REFERENCE POINT ID: NORMAL
RAD ONC ARIA REFERENCE POINT SESSION DOSAGE GIVEN: 1.8 GY
RAD ONC ARIA REFERENCE POINT SESSION DOSAGE GIVEN: 2.5 GY
RAD ONC ARIA REFERENCE POINT SESSION DOSAGE GIVEN: 2.62 GY

## 2023-11-22 ENCOUNTER — CLINICAL DOCUMENTATION (OUTPATIENT)
Facility: HOSPITAL | Age: 68
End: 2023-11-22

## 2023-11-22 ENCOUNTER — HOSPITAL ENCOUNTER (OUTPATIENT)
Facility: HOSPITAL | Age: 68
Discharge: HOME | End: 2023-11-25
Attending: RADIOLOGY

## 2023-11-22 LAB
RAD ONC ARIA COURSE FIRST TREATMENT DATE: NORMAL
RAD ONC ARIA COURSE ID: NORMAL
RAD ONC ARIA COURSE LAST TREATMENT DATE: NORMAL
RAD ONC ARIA COURSE SESSION NUMBER: 28
RAD ONC ARIA COURSE TREATMENT ELAPSED DAYS: 37
RAD ONC ARIA PLAN FRACTIONS TREATED TO DATE: 28
RAD ONC ARIA PLAN ID: NORMAL
RAD ONC ARIA PLAN PRESCRIBED DOSE PER FRACTION: 2.5 GY
RAD ONC ARIA PLAN PRIMARY REFERENCE POINT: NORMAL
RAD ONC ARIA PLAN TOTAL FRACTIONS PRESCRIBED: 28
RAD ONC ARIA PLAN TOTAL PRESCRIBED DOSE: 7000 CGY
RAD ONC ARIA REFERENCE POINT DOSAGE GIVEN TO DATE: 50.4 GY
RAD ONC ARIA REFERENCE POINT DOSAGE GIVEN TO DATE: 70 GY
RAD ONC ARIA REFERENCE POINT DOSAGE GIVEN TO DATE: 73.38 GY
RAD ONC ARIA REFERENCE POINT ID: NORMAL
RAD ONC ARIA REFERENCE POINT SESSION DOSAGE GIVEN: 1.8 GY
RAD ONC ARIA REFERENCE POINT SESSION DOSAGE GIVEN: 2.5 GY
RAD ONC ARIA REFERENCE POINT SESSION DOSAGE GIVEN: 2.62 GY

## 2023-11-22 NOTE — PROGRESS NOTES
Cancer [place] at [facility]  [facility]      RADIATION ONCOLOGY CLINICAL END OF TREATMENT NOTE    Encounter Date: 11/22/23   Patient Name: Stephanie Rodriges  YOB: 1955  Medical Record Number: [id number]    DIAGNOSIS:       ICD-10-CM     1. Prostate cancer ([address])  C61           STAGING: Cancer Staging   Malignant neoplasm of prostate [facility])  Staging form: Prostate, AJCC 8th Edition  - Clinical stage from 7/13/2023: Stage IIIC (cT2b, cN0, cM0, PSA: 7.7, Grade Group: 5) - Signed by Agustina Lozano MD on 10/23/2023      ASSESSMENT: 78 yo M with high risk risk prostate cancer, cT2b, iPSA 7.7, Erna 4+5=9 in 3 cores (10/15 total cores involved).     TREATMENT SUMMARY  Treatment Site Modality Dose/Fx (cGy) #Fx Total Dose (cGy) Start Date End Date Elapsed Days   Prostate/SV IMRT 250 28/28 7000 10/16/23 11/22/23 38   Pelvic LN IMRT 180 28/28 5040 10/16/23 11/22/23 38       CONCURRENT THERAPY: ADT x 24 months + Xtandi    FOLLOW UP:  3 months    TREATMENT RESPONSE:  Treatment completed as planned    MEDICATIONS:    Current Outpatient Medications:     ondansetron (ZOFRAN-ODT) 4 MG disintegrating tablet, Take 1 tablet by mouth 3 times daily as needed for Nausea, Disp: 30 tablet, Rfl: 0    XTANDI 40 MG TABS, , Disp: , Rfl:     amLODIPine (NORVASC) 10 MG tablet, Take 1 tablet by mouth, Disp: , Rfl:     apixaban (ELIQUIS) 5 MG TABS tablet, Take 1 tablet by mouth 2 times daily (Patient not taking: Reported on 8/23/2023), Disp: , Rfl:     celecoxib (CELEBREX) 200 MG capsule, Take 1 capsule by mouth daily, Disp: , Rfl:     doxazosin (CARDURA) 1 MG tablet, Take 1 tablet by mouth daily, Disp: , Rfl:     flecainide (TAMBOCOR) 50 MG tablet, Take 1 tablet by mouth 2 times daily, Disp: , Rfl:     lisinopril (PRINIVIL;ZESTRIL) 20 MG tablet, Take 1 tablet by mouth daily (Patient not taking: Reported on 8/23/2023), Disp: , Rfl:     magnesium oxide (MAG-OX) 400 MG tablet, Take 1

## 2024-02-13 DIAGNOSIS — C61 MALIGNANT NEOPLASM OF PROSTATE (HCC): Primary | ICD-10-CM

## 2024-04-15 NOTE — PROGRESS NOTES
Cancer [place] at [facility]  [facility]      RADIATION ONCOLOGY FOLLOW-UP VISIT NOTE     Encounter Date: 04/16/24   Patient Name: Irving Avelar  YOB: 1955  Medical Record Number: [id number]  Referring Physician: Jason Gomez MD  [address] [facility]  [address]  [place] [address]  Primary Care Provider: Pancho Adams MD      DIAGNOSIS:       ICD-10-CM    1. Malignant neoplasm of prostate ([facility])  C61         STAGING:    Cancer Staging   Malignant neoplasm of prostate ([facility])  Staging form: Prostate, AJCC 8th Edition  - Clinical stage from 7/13/2023: Stage IIIC (cT2b, cN0, cM0, PSA: 7.7, Grade Group: 5) - Signed by Saturnino Mckenzie MD on 10/23/2023  AJCC Staging has been reviewed      ONCOLOGIC HISTORY:   Patient seen by urology in 8/2022 for an elevated PSA.  PSA was 4.8 in 2019 then increased to 7.7 in 2/2022.  Mild to moderate LUTs on tamsulosin, normal [place], and no family history of prostate cancer.  A pelvic MRI was recommended.     9/19/22 - pelvic MRI ([facility]) shows a prostate volume of 84cc, a PIRADS-5 lesion in the posterior right PZ mid gland to apex measuring 1.6cm with areas of DL, and two PIRADS-4 lesions (left PZ mid gland/apex and right TZ without DL). No SVI or adenopathy.     Biopsy delayed due to patient getting a Watchman device and off anticoagulants.      7/13/23 - TRUS 12 core prostate + 3 MRI targeted fusion biopsy shows cT2b (hard over right gland) and prostate volume of 75.43cc:   Right apex medial: adenocarcinoma, Erna 4+5=9 involving 40% of 1/1 cores.   Right apex lateral: adenocarcinoma, Roscoe 3+4=7 involving 60% of 1/1 cores. PNI+  Right mid medial:  adenocarcinoma, Roscoe 4+5=9 involving 40% of 1/1 cores.  Right mid lateral: adenocarcinoma, Erna 3+3=6 involving <5% of 1/1 cores.  Right base medial: benign  Right base lateral: benign  Left apex medial: adenocarcinoma, Roscoe 3+4=7 involving 40% of 1/1 cores.

## 2024-04-16 ENCOUNTER — HOSPITAL ENCOUNTER (OUTPATIENT)
Facility: HOSPITAL | Age: 69
Discharge: HOME | End: 2024-04-19

## 2024-04-16 VITALS
RESPIRATION RATE: 16 BRPM | DIASTOLIC BLOOD PRESSURE: 93 MMHG | OXYGEN SATURATION: 98 % | WEIGHT: 186 LBS | HEART RATE: 77 BPM | BODY MASS INDEX: 27.55 KG/M2 | SYSTOLIC BLOOD PRESSURE: 154 MMHG | HEIGHT: 69 IN

## 2024-04-16 DIAGNOSIS — R39.15 URINARY URGENCY: ICD-10-CM

## 2024-04-16 DIAGNOSIS — C61 PROSTATE CANCER (HCC): Primary | ICD-10-CM

## 2024-04-16 DIAGNOSIS — C61 MALIGNANT NEOPLASM OF PROSTATE (HCC): Primary | ICD-10-CM

## 2024-04-16 RX ORDER — DULOXETIN HYDROCHLORIDE 30 MG/1
CAPSULE, DELAYED RELEASE ORAL
COMMUNITY
Start: 2024-04-15

## 2024-04-16 RX ORDER — ATORVASTATIN CALCIUM 10 MG/1
10 TABLET, FILM COATED ORAL DAILY
COMMUNITY

## 2024-04-16 RX ORDER — SILODOSIN 4 MG/1
4 CAPSULE ORAL EVERY EVENING
Qty: 30 CAPSULE | Refills: 3 | Status: SHIPPED | OUTPATIENT
Start: 2024-04-16

## 2024-04-16 ASSESSMENT — PAIN SCALES - GENERAL: PAINLEVEL_OUTOF10: 0

## 2024-04-16 NOTE — ADDENDUM NOTE
Encounter addended by: Saturnino Mckenzie MD on: 4/16/2024 10:17 AM   Actions taken: Clinical Note Signed

## 2024-08-12 DIAGNOSIS — R39.15 URINARY URGENCY: ICD-10-CM

## 2024-08-12 DIAGNOSIS — C61 PROSTATE CANCER (HCC): ICD-10-CM

## 2024-08-12 RX ORDER — SILODOSIN 4 MG/1
4 CAPSULE ORAL EVERY EVENING
Qty: 30 CAPSULE | Refills: 3 | Status: SHIPPED | OUTPATIENT
Start: 2024-08-12

## 2024-11-06 ENCOUNTER — HOSPITAL ENCOUNTER (EMERGENCY)
Facility: HOSPITAL | Age: 69
Discharge: HOME HEALTH | End: 2024-11-06
Attending: EMERGENCY MEDICINE
Payer: COMMERCIAL

## 2024-11-06 VITALS
OXYGEN SATURATION: 96 % | RESPIRATION RATE: 18 BRPM | TEMPERATURE: 98.5 F | HEIGHT: 68 IN | SYSTOLIC BLOOD PRESSURE: 171 MMHG | HEART RATE: 83 BPM | WEIGHT: 185 LBS | BODY MASS INDEX: 28.04 KG/M2 | DIASTOLIC BLOOD PRESSURE: 98 MMHG

## 2024-11-06 DIAGNOSIS — J06.9 VIRAL URI: Primary | ICD-10-CM

## 2024-11-06 LAB
FLUAV RNA SPEC QL NAA+PROBE: NOT DETECTED
FLUBV RNA SPEC QL NAA+PROBE: NOT DETECTED
SARS-COV-2 RNA RESP QL NAA+PROBE: NOT DETECTED
SOURCE: NORMAL

## 2024-11-06 PROCEDURE — 99283 EMERGENCY DEPT VISIT LOW MDM: CPT

## 2024-11-06 PROCEDURE — 87636 SARSCOV2 & INF A&B AMP PRB: CPT

## 2024-11-06 NOTE — ED TRIAGE NOTES
Patient arrives to the ED for complaints of sore throat, cough, chills, headache which started Sunday.      States he tested positive for Covid yesterday. Reports he needs a doctor's note and a Covid test to return to work.     Denies chest pain, shortness of breath.     PMH HTN, Afib, Prostate cancer.

## 2024-11-06 NOTE — DISCHARGE INSTRUCTIONS
You tested negative for flu and COVID today.  You may be experiencing a viral upper respiratory infection that may take some time to work through.  Please continue supportive care as you have been doing and if symptoms worsen or new concerning symptoms arise, please report to nearest emergency department.    Thank you for allowing us to provide you with medical care today.  We realize that you have many choices for your emergency care needs.  We thank you for choosing [facility].  Please choose us in the future for any continued health care needs.     The exam and treatment you received in the Emergency Department were for an emergent problem and are not intended as complete care. It is important that you follow up with a doctor, nurse practitioner, or physician assistant for ongoing care. If your symptoms worsen or you do not improve as expected and you are unable to reach your usual health care provider, you should return to the Emergency Department.  We are available 24 hours a day.     Please make an appointment with your health care provider(s) for follow up of your Emergency Department visit.  Take this sheet with you when you go to your follow-up visit.

## 2024-11-06 NOTE — ED PROVIDER NOTES
kg/m².    Physical Exam  Vitals and nursing note reviewed.   Constitutional:       Appearance: Normal appearance.   HENT:      Head: Normocephalic.      Nose: Nose normal. No congestion.      Mouth/Throat:      Mouth: Mucous membranes are moist.      Pharynx: No oropharyngeal exudate or posterior oropharyngeal erythema.   Eyes:      Extraocular Movements: Extraocular movements intact.      Conjunctiva/sclera: Conjunctivae normal.   Cardiovascular:      Rate and Rhythm: Normal rate.   Pulmonary:      Effort: Pulmonary effort is normal.      Breath sounds: Normal breath sounds.   Abdominal:      Tenderness: There is no abdominal tenderness.   Musculoskeletal:         General: Normal range of motion.      Cervical back: Normal range of motion and neck supple. No rigidity.   Skin:     General: Skin is warm and dry.      Capillary Refill: Capillary refill takes less than 2 seconds.   Neurological:      Mental Status: He is alert and oriented to person, place, and time.   Psychiatric:         Mood and Affect: Mood normal.         Behavior: Behavior normal.         DIAGNOSTIC RESULTS     EKG: All EKG's are interpreted by the Emergency Department Physician who either signs or Co-signs this chart in the absence of a cardiologist.        RADIOLOGY:   Non-plain film images such as CT, Ultrasound and MRI are read by the radiologist. Plain radiographic images are visualized and preliminarily interpreted by the emergency physician with the below findings:        Interpretation per the Radiologist below, if available at the time of this note:    No orders to display        LABS:  Labs Reviewed   COVID-19 & INFLUENZA COMBO       All other labs were within normal range or not returned as of this dictation.    EMERGENCY DEPARTMENT COURSE and DIFFERENTIAL DIAGNOSIS/MDM:   Vitals:    Vitals:    11/06/24 1252 11/06/24 1254   BP:  (!) 171/98   Pulse: 83    Resp: 18    Temp: 98.5 °F (36.9 °C)    TempSrc: Oral    SpO2: 96%    Weight: 83.9

## 2024-12-14 DIAGNOSIS — R39.15 URINARY URGENCY: ICD-10-CM

## 2024-12-14 DIAGNOSIS — C61 PROSTATE CANCER (HCC): ICD-10-CM

## 2024-12-16 RX ORDER — SILODOSIN 4 MG/1
4 CAPSULE ORAL EVERY EVENING
Qty: 30 CAPSULE | Refills: 3 | Status: SHIPPED | OUTPATIENT
Start: 2024-12-16

## 2025-04-28 DIAGNOSIS — C61 PROSTATE CANCER (HCC): ICD-10-CM

## 2025-04-28 DIAGNOSIS — R39.15 URINARY URGENCY: ICD-10-CM

## 2025-04-30 RX ORDER — SILODOSIN 4 MG/1
4 CAPSULE ORAL EVERY EVENING
Qty: 30 CAPSULE | Refills: 3 | Status: SHIPPED | OUTPATIENT
Start: 2025-04-30

## 2025-09-04 DIAGNOSIS — C61 PROSTATE CANCER (HCC): ICD-10-CM

## 2025-09-04 DIAGNOSIS — R39.15 URINARY URGENCY: ICD-10-CM

## 2025-09-04 RX ORDER — SILODOSIN 4 MG/1
4 CAPSULE ORAL EVERY EVENING
Qty: 30 CAPSULE | Refills: 3 | Status: SHIPPED | OUTPATIENT
Start: 2025-09-04

## (undated) DEVICE — 4-PORT MANIFOLD: Brand: NEPTUNE 2

## (undated) DEVICE — NEEDLE HYPO 21GA L1.5IN INTRAMUSCULAR S STL LATCH BVL UP

## (undated) DEVICE — BLADE SAW W073XL276IN THK0031IN CUT THK0036IN REPL SAG

## (undated) DEVICE — SOLUTION IRRIG 3000ML 0.9% SOD CHL FLX CONT 0797208] ICU MEDICAL INC]

## (undated) DEVICE — PREP SKN PREVAIL 40ML APPL --

## (undated) DEVICE — DRAPE XR C ARM 41X74IN LF --

## (undated) DEVICE — REM POLYHESIVE ADULT PATIENT RETURN ELECTRODE: Brand: VALLEYLAB

## (undated) DEVICE — STERILE POLYISOPRENE POWDER-FREE SURGICAL GLOVES WITH EMOLLIENT COATING: Brand: PROTEXIS

## (undated) DEVICE — INFECTION CONTROL KIT SYS

## (undated) DEVICE — SUTURE VCRL SZ 2 L54IN ABSRB UD L65MM TP-1 1/2 CIR J880T

## (undated) DEVICE — Device

## (undated) DEVICE — 6619 2 PTNT ISO SYS INCISE AREA&LT;(&GT;&&LT;)&GT;P: Brand: STERI-DRAPE™ IOBAN™ 2

## (undated) DEVICE — HANDPIECE SET WITH BONE CLEANING TIP AND SUCTION TUBE: Brand: INTERPULSE

## (undated) DEVICE — SYRINGE MED 20ML STD CLR PLAS LUERLOCK TIP N CTRL DISP

## (undated) DEVICE — SUTURE MCRYL SZ 4 0 L18IN ABSRB VLT PS 1 L24MM 3 8 CIR REV Y682H

## (undated) DEVICE — Z DISCONTINUED USE 2744636  DRESSING AQUACEL 14 IN ALG W3.5XL14IN POLYUR FLM CVR W/ HYDRCOLL

## (undated) DEVICE — NEEDLE HYPO 18GA L1.5IN PNK S STL HUB POLYPR SHLD REG BVL

## (undated) DEVICE — DRAPE,U/ SHT,SPLIT,PLAS,STERIL: Brand: MEDLINE

## (undated) DEVICE — SUTURE ABSORBABLE BRAIDED 2-0 CT-1 27 IN UD VICRYL J259H

## (undated) DEVICE — DEVON™ KNEE AND BODY STRAP 60" X 3" (1.5 M X 7.6 CM): Brand: DEVON

## (undated) DEVICE — SUTURE MCRYL SZ 2-0 L36IN ABSRB UD L36MM CT-1 1/2 CIR Y945H

## (undated) DEVICE — SKIN CLOS DERMABND PRINEO 60CM -- DERMABOUND PRINEO

## (undated) DEVICE — SUTURE VCRL SZ 2-0 L27IN ABSRB UD L26MM SH 1/2 CIR J417H

## (undated) DEVICE — QUADRA H CEMENTLESS STEM LATERALIZED SIZE 3 SHORT NECK
Type: IMPLANTABLE DEVICE | Site: HIP | Status: NON-FUNCTIONAL
Brand: QUADRA H FEMORAL STEMS

## (undated) DEVICE — T4 HOOD

## (undated) DEVICE — TRAY CATH 16F URIN MTR LTX -- CONVERT TO ITEM 363111

## (undated) DEVICE — STERILE POLYISOPRENE POWDER-FREE SURGICAL GLOVES: Brand: PROTEXIS

## (undated) DEVICE — CONTAINER,SPECIMEN,3OZ,OR STRL: Brand: MEDLINE

## (undated) DEVICE — PADDING CAST SPEC 6INX4YD COT --

## (undated) DEVICE — SCRUB DRY SURG EZ SCRUB BRUSH PREOPERATIVE GRN

## (undated) DEVICE — SOLUTION IRRIG 1000ML H2O STRL BLT

## (undated) DEVICE — SOLUTION IV 50ML 0.9% SOD CHL

## (undated) DEVICE — CONTAINER,SPECIMEN,STRL PATH,4OZ: Brand: MEDLINE

## (undated) DEVICE — SUTURE STRATAFIX SPRL SZ 1 L14IN ABSRB VLT L48CM CTX 1/2 SXPD2B405

## (undated) DEVICE — ADHESIVE SKIN CLOSURE 4X22 CM PREMIERPRO EXOFINFUSION DISP